# Patient Record
Sex: FEMALE | Race: WHITE | Employment: OTHER | URBAN - METROPOLITAN AREA
[De-identification: names, ages, dates, MRNs, and addresses within clinical notes are randomized per-mention and may not be internally consistent; named-entity substitution may affect disease eponyms.]

---

## 2017-08-18 ENCOUNTER — OFFICE VISIT (OUTPATIENT)
Dept: NEUROLOGY | Age: 57
End: 2017-08-18
Payer: MEDICARE

## 2017-08-18 VITALS
WEIGHT: 187 LBS | SYSTOLIC BLOOD PRESSURE: 95 MMHG | HEIGHT: 64 IN | DIASTOLIC BLOOD PRESSURE: 55 MMHG | HEART RATE: 54 BPM | BODY MASS INDEX: 31.92 KG/M2

## 2017-08-18 DIAGNOSIS — I63.032 CEREBRAL INFARCTION DUE TO THROMBOSIS OF LEFT CAROTID ARTERY (HCC): Primary | ICD-10-CM

## 2017-08-18 DIAGNOSIS — I48.91 ATRIAL FIBRILLATION, UNSPECIFIED TYPE (HCC): ICD-10-CM

## 2017-08-18 PROCEDURE — G8417 CALC BMI ABV UP PARAM F/U: HCPCS | Performed by: PSYCHIATRY & NEUROLOGY

## 2017-08-18 PROCEDURE — 3017F COLORECTAL CA SCREEN DOC REV: CPT | Performed by: PSYCHIATRY & NEUROLOGY

## 2017-08-18 PROCEDURE — 1036F TOBACCO NON-USER: CPT | Performed by: PSYCHIATRY & NEUROLOGY

## 2017-08-18 PROCEDURE — 3014F SCREEN MAMMO DOC REV: CPT | Performed by: PSYCHIATRY & NEUROLOGY

## 2017-08-18 PROCEDURE — 99214 OFFICE O/P EST MOD 30 MIN: CPT | Performed by: PSYCHIATRY & NEUROLOGY

## 2017-08-18 PROCEDURE — G8427 DOCREV CUR MEDS BY ELIG CLIN: HCPCS | Performed by: PSYCHIATRY & NEUROLOGY

## 2017-08-18 PROCEDURE — G8598 ASA/ANTIPLAT THER USED: HCPCS | Performed by: PSYCHIATRY & NEUROLOGY

## 2017-08-18 RX ORDER — FUROSEMIDE 40 MG/1
80 TABLET ORAL 2 TIMES DAILY
Status: ON HOLD | COMMUNITY
End: 2021-12-14 | Stop reason: HOSPADM

## 2017-08-18 RX ORDER — ATORVASTATIN CALCIUM 10 MG/1
10 TABLET, FILM COATED ORAL NIGHTLY
COMMUNITY

## 2017-09-02 ENCOUNTER — HOSPITAL ENCOUNTER (OUTPATIENT)
Dept: VASCULAR LAB | Age: 57
Discharge: HOME | End: 2017-09-02
Payer: MEDICARE

## 2017-09-02 DIAGNOSIS — I63.032 CEREBRAL INFARCTION DUE TO THROMBOSIS OF LEFT CAROTID ARTERY (HCC): ICD-10-CM

## 2017-09-02 PROCEDURE — 93880 EXTRACRANIAL BILAT STUDY: CPT

## 2018-08-20 ENCOUNTER — OFFICE VISIT (OUTPATIENT)
Dept: NEUROLOGY | Age: 58
End: 2018-08-20
Payer: MEDICARE

## 2018-08-20 VITALS
HEIGHT: 64 IN | WEIGHT: 198.4 LBS | SYSTOLIC BLOOD PRESSURE: 99 MMHG | DIASTOLIC BLOOD PRESSURE: 64 MMHG | BODY MASS INDEX: 33.87 KG/M2 | HEART RATE: 82 BPM

## 2018-08-20 DIAGNOSIS — I48.91 ATRIAL FIBRILLATION, UNSPECIFIED TYPE (HCC): ICD-10-CM

## 2018-08-20 DIAGNOSIS — I63.032 CEREBRAL INFARCTION DUE TO THROMBOSIS OF LEFT CAROTID ARTERY (HCC): Primary | ICD-10-CM

## 2018-08-20 PROCEDURE — G8417 CALC BMI ABV UP PARAM F/U: HCPCS | Performed by: PSYCHIATRY & NEUROLOGY

## 2018-08-20 PROCEDURE — 1036F TOBACCO NON-USER: CPT | Performed by: PSYCHIATRY & NEUROLOGY

## 2018-08-20 PROCEDURE — G8598 ASA/ANTIPLAT THER USED: HCPCS | Performed by: PSYCHIATRY & NEUROLOGY

## 2018-08-20 PROCEDURE — 99214 OFFICE O/P EST MOD 30 MIN: CPT | Performed by: PSYCHIATRY & NEUROLOGY

## 2018-08-20 PROCEDURE — G8427 DOCREV CUR MEDS BY ELIG CLIN: HCPCS | Performed by: PSYCHIATRY & NEUROLOGY

## 2018-08-20 PROCEDURE — 3017F COLORECTAL CA SCREEN DOC REV: CPT | Performed by: PSYCHIATRY & NEUROLOGY

## 2018-08-20 RX ORDER — ROPINIROLE 0.5 MG/1
0.5 TABLET, FILM COATED ORAL 3 TIMES DAILY
COMMUNITY
End: 2019-09-25 | Stop reason: ALTCHOICE

## 2018-08-20 RX ORDER — ROPINIROLE 0.5 MG/1
TABLET, FILM COATED ORAL
Qty: 180 TABLET | Refills: 3 | Status: SHIPPED | OUTPATIENT
Start: 2018-08-20 | End: 2019-07-27 | Stop reason: SDUPTHER

## 2018-08-20 RX ORDER — METOPROLOL TARTRATE 75 MG/1
12.5 TABLET, FILM COATED ORAL 2 TIMES DAILY
COMMUNITY
End: 2020-06-11 | Stop reason: HOSPADM

## 2018-08-20 RX ORDER — FLUTICASONE PROPIONATE 50 MCG
1 SPRAY, SUSPENSION (ML) NASAL DAILY
COMMUNITY
End: 2019-09-25 | Stop reason: ALTCHOICE

## 2018-08-20 ASSESSMENT — ENCOUNTER SYMPTOMS
SHORTNESS OF BREATH: 1
ALLERGIC/IMMUNOLOGIC NEGATIVE: 1
EYES NEGATIVE: 1
GASTROINTESTINAL NEGATIVE: 1

## 2018-08-20 NOTE — PROGRESS NOTES
for arthralgias and gait problem. Allergic/Immunologic: Negative. Hematological: Negative. Psychiatric/Behavioral: The patient is nervous/anxious. Objective:   Physical Exam    Vitals:    08/20/18 1450   BP: 99/64   Pulse: 82     weight: 198 lb 6.4 oz (90 kg)    Neurological Examination  Ears /Nose/Throat: external ear . Normal exam  Neck and thyroid . Normal size  Respiratory . Breathsounds clear bilaterally  Cardiovascular: Auscultation of heart with regular rate and rhythm   Musculoskeletal. Muscle bulk and tone normal   Muscle strength 5/5 strength throughout   No dysmetria or dysdiadokinesis  No tremor   Normal fine motor  Orientation Alert and oriented x 3   Language process and speech normal . No aphasia   Cranial nerve 2 normal acuety and visual fields  Cranial nerve 3, 4 and 6 . Extraocular muscles are intact . Pupils are equal and reactive   Cranial nerve 5 . Intact corneal reflex. Normal facial sensation  Cranial nerve 7 normal exam   Cranial nerve 8. Grossly intact hearing   Cranial nerve 9 and 10. Symmetric palate elevation   Cranial nerve 11 , 5 out of 5 strength   Cranial Nerve 12 midline tongue . No atrophy  Sensation . Normal pinprick and light touch   Deep Tendon Reflexes normal  Plantar response flexor bilaterally    Assessment:      1. Cerebral infarction due to thrombosis of left carotid artery ([address]    2.  Atrial fibrillation, unspecified type ([address]    Will readjust requip for restless legs to 0.5 mg po q 6PM and hs otherwise to continue current medication regimen        Plan:      As above

## 2018-08-20 NOTE — LETTER
 desvenlafaxine succinate (PRISTIQ) 100 MG TB24 Take 100 mg by mouth daily      gabapentin (NEURONTIN) 600 MG tablet Take 600 mg by mouth 3 times daily       No current facility-administered medications for this visit. No Known Allergies      Review of Systems   Constitutional: Positive for fatigue. HENT: Negative. Eyes: Negative. Respiratory: Positive for shortness of breath. Cardiovascular: Positive for leg swelling. Gastrointestinal: Negative. Endocrine: Negative. Genitourinary: Negative. Musculoskeletal: Positive for arthralgias and gait problem. Allergic/Immunologic: Negative. Hematological: Negative. Psychiatric/Behavioral: The patient is nervous/anxious. Objective:   Physical Exam    Vitals:    08/20/18 1450   BP: 99/64   Pulse: 82     weight: 198 lb 6.4 oz (90 kg)    Neurological Examination  Ears /Nose/Throat: external ear . Normal exam  Neck and thyroid . Normal size  Respiratory . Breathsounds clear bilaterally  Cardiovascular: Auscultation of heart with regular rate and rhythm   Musculoskeletal. Muscle bulk and tone normal   Muscle strength 5/5 strength throughout   No dysmetria or dysdiadokinesis  No tremor   Normal fine motor  Orientation Alert and oriented x 3   Language process and speech normal . No aphasia   Cranial nerve 2 normal acuety and visual fields  Cranial nerve 3, 4 and 6 . Extraocular muscles are intact . Pupils are equal and reactive   Cranial nerve 5 . Intact corneal reflex. Normal facial sensation  Cranial nerve 7 normal exam   Cranial nerve 8. Grossly intact hearing   Cranial nerve 9 and 10. Symmetric palate elevation   Cranial nerve 11 , 5 out of 5 strength   Cranial Nerve 12 midline tongue . No atrophy  Sensation . Normal pinprick and light touch   Deep Tendon Reflexes normal  Plantar response flexor bilaterally    Assessment:      1.  Cerebral infarction due to thrombosis of left carotid artery ([facility] [address]

## 2018-08-20 NOTE — COMMUNICATION BODY
Subjective:      Patient ID: Abilio Saavedra is a 59 y.o. female. HPI    Active problem left hemispheric infarction with prior aphasia along with right hemiparesis resolved with TPA and left ICA terminus thrombus retrieval with atrial fibrillation, 2016 . The condition is she is doing well with normal language with no weakness ,numbness , bulbar or visual complaint  . She has been changed over to [place] [id number] by cardiology . She is ambulating independantly with no disturbance . She describes rare transient head pain of few seconds . Significant medication xarelto 10 mg po qd ,  lipitor 10 mg po qd. Previously on pradaxa. She is having  restless legs at night which is interfering with sleep . Requip 0.5 mg po qhs was placed by PMD with some attenuation for the first 2 months . Restless legs complaints will begin by 7 PM when she sits down  . There is mild intermittent snoring with no apnea at night . Testing Head CT showed dense left MCA artery . CTA with left ICA terminus occlusion . CT perfusion with large left hemispheric penumbra with only small ischemic core left basal ganglia . Patient did very well post intervention returning to normal exam with full motor strength with normal language . MRI of Head with tiny punctate infarction left caudate along with left frontal periventricular area . Cholesterol 118 , , TG 92, AMY normal, homocysteine normal , Hga1c 5.4 , antithrombin 3 80 (), lupus anticoagulant normal, prothrombin mutation normal , Factor V Leiden, anticardiolipin Ab negative . SAUL EF 50-55% . Moderate to severe MR, moderate TR .  Carotid US 16-49 % stenosis bilaterally , 2017      Past Medical History:   Diagnosis Date    Arm pain Right and Left    Depression     Golfer's elbow Right and Left    Heart palpitations     Radial tunnel syndrome Right radial tunnel release     Tennis elbow Right and left       Past Surgical History:   Procedure Laterality Date     SECTION

## 2019-07-30 RX ORDER — ROPINIROLE 0.5 MG/1
TABLET, FILM COATED ORAL
Qty: 180 TABLET | Refills: 0 | Status: SHIPPED | OUTPATIENT
Start: 2019-07-30 | End: 2019-12-02

## 2019-09-25 ENCOUNTER — OFFICE VISIT (OUTPATIENT)
Dept: NEUROLOGY | Age: 59
End: 2019-09-25
Payer: MEDICARE

## 2019-09-25 VITALS
HEART RATE: 77 BPM | BODY MASS INDEX: 33.63 KG/M2 | HEIGHT: 64 IN | DIASTOLIC BLOOD PRESSURE: 73 MMHG | SYSTOLIC BLOOD PRESSURE: 118 MMHG | WEIGHT: 197 LBS

## 2019-09-25 DIAGNOSIS — I48.91 ATRIAL FIBRILLATION, UNSPECIFIED TYPE (HCC): ICD-10-CM

## 2019-09-25 DIAGNOSIS — I63.132 CEREBRAL INFARCTION DUE TO EMBOLISM OF LEFT CAROTID ARTERY (HCC): Primary | ICD-10-CM

## 2019-09-25 DIAGNOSIS — G25.81 RESTLESS LEGS SYNDROME: ICD-10-CM

## 2019-09-25 PROCEDURE — G8427 DOCREV CUR MEDS BY ELIG CLIN: HCPCS | Performed by: PSYCHIATRY & NEUROLOGY

## 2019-09-25 PROCEDURE — G8598 ASA/ANTIPLAT THER USED: HCPCS | Performed by: PSYCHIATRY & NEUROLOGY

## 2019-09-25 PROCEDURE — 3017F COLORECTAL CA SCREEN DOC REV: CPT | Performed by: PSYCHIATRY & NEUROLOGY

## 2019-09-25 PROCEDURE — 1036F TOBACCO NON-USER: CPT | Performed by: PSYCHIATRY & NEUROLOGY

## 2019-09-25 PROCEDURE — 99214 OFFICE O/P EST MOD 30 MIN: CPT | Performed by: PSYCHIATRY & NEUROLOGY

## 2019-09-25 PROCEDURE — G8417 CALC BMI ABV UP PARAM F/U: HCPCS | Performed by: PSYCHIATRY & NEUROLOGY

## 2019-09-25 RX ORDER — ROPINIROLE 1 MG/1
TABLET, FILM COATED ORAL
Qty: 180 TABLET | Refills: 3 | Status: SHIPPED | OUTPATIENT
Start: 2019-09-25 | End: 2020-05-15 | Stop reason: SDUPTHER

## 2019-09-25 ASSESSMENT — ENCOUNTER SYMPTOMS
ALLERGIC/IMMUNOLOGIC NEGATIVE: 1
GASTROINTESTINAL NEGATIVE: 1
EYES NEGATIVE: 1
SHORTNESS OF BREATH: 1

## 2019-09-25 NOTE — LETTER
 Intimate partner violence:     Fear of current or ex partner: None     Emotionally abused: None     Physically abused: None     Forced sexual activity: None   Other Topics Concern    None   Social History Narrative    None       Current Outpatient Medications   Medication Sig Dispense Refill    rOPINIRole (REQUIP) 1 MG tablet Take 1 po q 6PM , 1po qhs 180 tablet 3    rOPINIRole (REQUIP) 0.5 MG tablet TAKE 1 TABLET BY MOUTH AT  6PM AND AT BEDTIME 180 tablet 0    Metoprolol Tartrate 75 MG TABS Take 75 mg by mouth 2 times daily      furosemide (LASIX) 40 MG tablet 2 qam and 1-2 in the afternoon      atorvastatin (LIPITOR) 10 MG tablet Take 10 mg by mouth daily      Potassium Chloride Natalie CR (KLOR-CON M20 PO) Take by mouth daily      Fluticasone-Salmeterol (ADVAIR HFA IN) Inhale 2 puffs into the lungs 2 times daily as needed      amiodarone (CORDARONE) 200 MG tablet Take 200 mg by mouth daily      baclofen (LIORESAL) 10 MG tablet Take 10 mg by mouth 2 times daily as needed      desvenlafaxine succinate (PRISTIQ) 100 MG TB24 Take 100 mg by mouth daily      gabapentin (NEURONTIN) 600 MG tablet Take 600 mg by mouth 3 times daily       No current facility-administered medications for this visit. No Known Allergies      Review of Systems   Constitutional: Positive for unexpected weight change. HENT: Negative. Eyes: Negative. Respiratory: Positive for shortness of breath. Cardiovascular: Positive for leg swelling. Gastrointestinal: Negative. Endocrine: Negative. Genitourinary: Negative. Musculoskeletal: Negative. Allergic/Immunologic: Negative. Hematological: Negative. Psychiatric/Behavioral: The patient is nervous/anxious. Objective:   Physical Exam    Vitals:    09/25/19 0939   BP: 118/73   Pulse: 77     weight: 197 lb (89.4 kg)    Neurological Examination  Ears /Nose/Throat: external ear . Normal exam  Neck and thyroid . Normal size

## 2019-09-25 NOTE — COMMUNICATION BODY
75 MG TABS Take 75 mg by mouth 2 times daily      furosemide (LASIX) 40 MG tablet 2 qam and 1-2 in the afternoon      atorvastatin (LIPITOR) 10 MG tablet Take 10 mg by mouth daily      Potassium Chloride Natalie CR (KLOR-CON M20 PO) Take by mouth daily      Fluticasone-Salmeterol (ADVAIR HFA IN) Inhale 2 puffs into the lungs 2 times daily as needed      amiodarone (CORDARONE) 200 MG tablet Take 200 mg by mouth daily      baclofen (LIORESAL) 10 MG tablet Take 10 mg by mouth 2 times daily as needed      desvenlafaxine succinate (PRISTIQ) 100 MG TB24 Take 100 mg by mouth daily      gabapentin (NEURONTIN) 600 MG tablet Take 600 mg by mouth 3 times daily       No current facility-administered medications for this visit. No Known Allergies      Review of Systems   Constitutional: Positive for unexpected weight change. HENT: Negative. Eyes: Negative. Respiratory: Positive for shortness of breath. Cardiovascular: Positive for leg swelling. Gastrointestinal: Negative. Endocrine: Negative. Genitourinary: Negative. Musculoskeletal: Negative. Allergic/Immunologic: Negative. Hematological: Negative. Psychiatric/Behavioral: The patient is nervous/anxious. Objective:   Physical Exam    Vitals:    09/25/19 0939   BP: 118/73   Pulse: 77     weight: 197 lb (89.4 kg)    Neurological Examination  Ears /Nose/Throat: external ear . Normal exam  Neck and thyroid . Normal size  Respiratory . Breathsounds clear bilaterally  Cardiovascular: Auscultation of heart with regular rate and rhythm   Musculoskeletal. Muscle bulk and tone normal   Muscle strength 5/5 strength throughout   No dysmetria or dysdiadokinesis  No tremor   Normal fine motor  Orientation Alert and oriented x 3   Language process and speech normal . No aphasia   Cranial nerve 2 normal acuety and visual fields  Cranial nerve 3, 4 and 6 . Extraocular muscles are intact . Pupils are equal and reactive   Cranial nerve 5 .  Intact

## 2019-09-30 DIAGNOSIS — I63.132 CEREBRAL INFARCTION DUE TO EMBOLISM OF LEFT CAROTID ARTERY (HCC): ICD-10-CM

## 2019-12-02 ENCOUNTER — HOSPITAL ENCOUNTER (OUTPATIENT)
Dept: CARDIAC CATH/INVASIVE PROCEDURES | Age: 59
Discharge: HOME | End: 2019-12-02
Payer: MEDICARE

## 2019-12-02 VITALS
BODY MASS INDEX: 32.44 KG/M2 | RESPIRATION RATE: 17 BRPM | OXYGEN SATURATION: 97 % | WEIGHT: 190 LBS | DIASTOLIC BLOOD PRESSURE: 78 MMHG | SYSTOLIC BLOOD PRESSURE: 118 MMHG | TEMPERATURE: 97.2 F | HEART RATE: 54 BPM | HEIGHT: 64 IN

## 2019-12-02 LAB
GFR NON-AFRICAN AMERICAN: 46 ML/MIN
GFR SERPL CREATININE-BSD FRML MDRD: 55 ML/MIN
GFR SERPL CREATININE-BSD FRML MDRD: ABNORMAL ML/MIN/{1.73_M2}
GLUCOSE BLD-MCNC: 97 MG/DL (ref 74–100)
POC CHLORIDE: 99 MMOL/L (ref 98–107)
POC CREATININE: 1.2 MG/DL (ref 0.51–1.19)
POC HEMATOCRIT: 51 % (ref 36–46)
POC HEMOGLOBIN: 17.3 G/DL (ref 12–16)
POC INR: 2.1
POC POTASSIUM: 3.4 MMOL/L (ref 3.5–4.5)
POC SODIUM: 139 MMOL/L (ref 138–146)
PROTHROMBIN TIME, POC: 24.1 SEC (ref 10.4–14.2)

## 2019-12-02 PROCEDURE — 92960 CARDIOVERSION ELECTRIC EXT: CPT | Performed by: INTERNAL MEDICINE

## 2019-12-02 PROCEDURE — 2709999900 HC NON-CHARGEABLE SUPPLY

## 2019-12-02 PROCEDURE — 84295 ASSAY OF SERUM SODIUM: CPT

## 2019-12-02 PROCEDURE — 84132 ASSAY OF SERUM POTASSIUM: CPT

## 2019-12-02 PROCEDURE — 85610 PROTHROMBIN TIME: CPT

## 2019-12-02 PROCEDURE — 85014 HEMATOCRIT: CPT

## 2019-12-02 PROCEDURE — 7100000000 HC PACU RECOVERY - FIRST 15 MIN

## 2019-12-02 PROCEDURE — 6360000002 HC RX W HCPCS

## 2019-12-02 PROCEDURE — 93005 ELECTROCARDIOGRAM TRACING: CPT | Performed by: INTERNAL MEDICINE

## 2019-12-02 PROCEDURE — 82435 ASSAY OF BLOOD CHLORIDE: CPT

## 2019-12-02 PROCEDURE — 7100000001 HC PACU RECOVERY - ADDTL 15 MIN

## 2019-12-02 PROCEDURE — 82947 ASSAY GLUCOSE BLOOD QUANT: CPT

## 2019-12-02 PROCEDURE — 82565 ASSAY OF CREATININE: CPT

## 2019-12-02 RX ORDER — SODIUM CHLORIDE 9 MG/ML
INJECTION, SOLUTION INTRAVENOUS CONTINUOUS
Status: DISCONTINUED | OUTPATIENT
Start: 2019-12-02 | End: 2019-12-03 | Stop reason: HOSPADM

## 2019-12-02 RX ADMIN — SODIUM CHLORIDE: 9 INJECTION, SOLUTION INTRAVENOUS at 10:18

## 2019-12-03 LAB
EKG ATRIAL RATE: 271 BPM
EKG Q-T INTERVAL: 432 MS
EKG QRS DURATION: 92 MS
EKG QTC CALCULATION (BAZETT): 473 MS
EKG R AXIS: 79 DEGREES
EKG T AXIS: 18 DEGREES
EKG VENTRICULAR RATE: 72 BPM

## 2020-04-13 NOTE — TELEPHONE ENCOUNTER
Mrs. Corina Guardado called the office wondering if the Requip dose could be increased. She currently takes 1 mg at 6 pm and 1 mg between 10 -11 pm.  Patient states that she only gets at most a couple hours sleep. She also stated that the restless legs can happen basically anytime during the day, anytime that she is not active. I told her I would speak with Dr. Jovanny Briceno and get back with her. Patient verbally stated her understanding.

## 2020-04-15 RX ORDER — CLONAZEPAM 0.5 MG/1
0.5 TABLET ORAL NIGHTLY
Qty: 30 TABLET | Refills: 0 | Status: SHIPPED | OUTPATIENT
Start: 2020-04-15 | End: 2020-06-11

## 2020-05-15 RX ORDER — ROPINIROLE 1 MG/1
TABLET, FILM COATED ORAL
Qty: 450 TABLET | Refills: 0 | Status: SHIPPED | OUTPATIENT
Start: 2020-05-15 | End: 2020-07-16

## 2020-05-15 NOTE — TELEPHONE ENCOUNTER
I spoke with Alice Perales to confirm that she was taking both the Requip and Klonopin. She said she was. I also ask when the restless legs bothered. She told me that anytime during the day when she sits they are bad and at night when lying down. I discussed this with Dr. George Mcbride. He is increasing the Requip to a total of 4 mg daily. She is to take one upon rising, one at 2 pm and two at bedtime. I called Alice Perales and gave her the dose increase. She voiced understanding. I also called [facility] and cancelled and refills left on the prescription for two tabs daily. I spoke with Martinez Camacho.

## 2020-05-15 NOTE — TELEPHONE ENCOUNTER
Sofiya Grijalva called 5/14/2020 to let us know that the Klonopin is not helping her restless legs at all. She said that the Repuip 1 mg at 6 pm and HS was helping more.

## 2020-06-08 ENCOUNTER — HOSPITAL ENCOUNTER (OUTPATIENT)
Dept: PREADMISSION TESTING | Age: 60
Setting detail: SPECIMEN
Discharge: HOME | End: 2020-06-12
Payer: MEDICARE

## 2020-06-08 PROCEDURE — U0004 COV-19 TEST NON-CDC HGH THRU: HCPCS

## 2020-06-09 LAB
SARS-COV-2, PCR: NOT DETECTED
SARS-COV-2, RAPID: NORMAL
SARS-COV-2: NORMAL
SOURCE: NORMAL

## 2020-06-10 ENCOUNTER — ANESTHESIA EVENT (OUTPATIENT)
Dept: CARDIAC CATH/INVASIVE PROCEDURES | Age: 60
End: 2020-06-10

## 2020-06-11 ENCOUNTER — HOSPITAL ENCOUNTER (OUTPATIENT)
Dept: CARDIAC CATH/INVASIVE PROCEDURES | Age: 60
Discharge: HOME | End: 2020-06-11
Payer: MEDICARE

## 2020-06-11 ENCOUNTER — ANESTHESIA (OUTPATIENT)
Dept: CARDIAC CATH/INVASIVE PROCEDURES | Age: 60
End: 2020-06-11

## 2020-06-11 VITALS — SYSTOLIC BLOOD PRESSURE: 140 MMHG | DIASTOLIC BLOOD PRESSURE: 127 MMHG | OXYGEN SATURATION: 100 % | TEMPERATURE: 94.9 F

## 2020-06-11 VITALS
DIASTOLIC BLOOD PRESSURE: 67 MMHG | RESPIRATION RATE: 20 BRPM | WEIGHT: 189 LBS | TEMPERATURE: 96.4 F | HEART RATE: 59 BPM | OXYGEN SATURATION: 100 % | BODY MASS INDEX: 32.27 KG/M2 | HEIGHT: 64 IN | SYSTOLIC BLOOD PRESSURE: 128 MMHG

## 2020-06-11 LAB
ACTIVATED CLOTTING TIME: 272 SEC (ref 79–149)
ACTIVATED CLOTTING TIME: 365 SEC (ref 79–149)
ACTIVATED CLOTTING TIME: 407 SEC (ref 79–149)
EKG ATRIAL RATE: 59 BPM
EKG P AXIS: 81 DEGREES
EKG P-R INTERVAL: 212 MS
EKG Q-T INTERVAL: 488 MS
EKG QRS DURATION: 92 MS
EKG QTC CALCULATION (BAZETT): 483 MS
EKG R AXIS: 91 DEGREES
EKG T AXIS: 49 DEGREES
EKG VENTRICULAR RATE: 59 BPM
POC INR: 1.3
PROTHROMBIN TIME, POC: 15.7 SEC (ref 10.4–14.2)

## 2020-06-11 PROCEDURE — C1894 INTRO/SHEATH, NON-LASER: HCPCS

## 2020-06-11 PROCEDURE — 2720000010 HC SURG SUPPLY STERILE

## 2020-06-11 PROCEDURE — 3700000000 HC ANESTHESIA ATTENDED CARE

## 2020-06-11 PROCEDURE — 2500000003 HC RX 250 WO HCPCS: Performed by: NURSE ANESTHETIST, CERTIFIED REGISTERED

## 2020-06-11 PROCEDURE — 93656 COMPRE EP EVAL ABLTJ ATR FIB: CPT | Performed by: INTERNAL MEDICINE

## 2020-06-11 PROCEDURE — 2500000003 HC RX 250 WO HCPCS

## 2020-06-11 PROCEDURE — 6360000002 HC RX W HCPCS: Performed by: NURSE ANESTHETIST, CERTIFIED REGISTERED

## 2020-06-11 PROCEDURE — 7100000000 HC PACU RECOVERY - FIRST 15 MIN

## 2020-06-11 PROCEDURE — C1732 CATH, EP, DIAG/ABL, 3D/VECT: HCPCS

## 2020-06-11 PROCEDURE — 93657 TX L/R ATRIAL FIB ADDL: CPT | Performed by: INTERNAL MEDICINE

## 2020-06-11 PROCEDURE — 93662 INTRACARDIAC ECG (ICE): CPT | Performed by: INTERNAL MEDICINE

## 2020-06-11 PROCEDURE — C1730 CATH, EP, 19 OR FEW ELECT: HCPCS

## 2020-06-11 PROCEDURE — 2580000003 HC RX 258: Performed by: NURSE ANESTHETIST, CERTIFIED REGISTERED

## 2020-06-11 PROCEDURE — 93312 ECHO TRANSESOPHAGEAL: CPT

## 2020-06-11 PROCEDURE — 6360000004 HC RX CONTRAST MEDICATION

## 2020-06-11 PROCEDURE — C1759 CATH, INTRA ECHOCARDIOGRAPHY: HCPCS

## 2020-06-11 PROCEDURE — 93005 ELECTROCARDIOGRAM TRACING: CPT | Performed by: INTERNAL MEDICINE

## 2020-06-11 PROCEDURE — 3700000001 HC ADD 15 MINUTES (ANESTHESIA)

## 2020-06-11 PROCEDURE — C1733 CATH, EP, OTHR THAN COOL-TIP: HCPCS

## 2020-06-11 PROCEDURE — 93655 ICAR CATH ABLTJ DSCRT ARRHYT: CPT | Performed by: INTERNAL MEDICINE

## 2020-06-11 PROCEDURE — 6360000002 HC RX W HCPCS

## 2020-06-11 PROCEDURE — 85347 COAGULATION TIME ACTIVATED: CPT

## 2020-06-11 PROCEDURE — 2709999900 HC NON-CHARGEABLE SUPPLY

## 2020-06-11 PROCEDURE — 93010 ELECTROCARDIOGRAM REPORT: CPT | Performed by: INTERNAL MEDICINE

## 2020-06-11 PROCEDURE — 7100000001 HC PACU RECOVERY - ADDTL 15 MIN

## 2020-06-11 PROCEDURE — C1769 GUIDE WIRE: HCPCS

## 2020-06-11 PROCEDURE — 93613 INTRACARDIAC EPHYS 3D MAPG: CPT | Performed by: INTERNAL MEDICINE

## 2020-06-11 PROCEDURE — 85610 PROTHROMBIN TIME: CPT

## 2020-06-11 PROCEDURE — 93325 DOPPLER ECHO COLOR FLOW MAPG: CPT

## 2020-06-11 RX ORDER — LIDOCAINE HYDROCHLORIDE 10 MG/ML
INJECTION, SOLUTION EPIDURAL; INFILTRATION; INTRACAUDAL; PERINEURAL PRN
Status: DISCONTINUED | OUTPATIENT
Start: 2020-06-11 | End: 2020-06-11 | Stop reason: SDUPTHER

## 2020-06-11 RX ORDER — DEXAMETHASONE SODIUM PHOSPHATE 10 MG/ML
INJECTION INTRAMUSCULAR; INTRAVENOUS PRN
Status: DISCONTINUED | OUTPATIENT
Start: 2020-06-11 | End: 2020-06-11 | Stop reason: SDUPTHER

## 2020-06-11 RX ORDER — GLYCOPYRROLATE 1 MG/5 ML
SYRINGE (ML) INTRAVENOUS PRN
Status: DISCONTINUED | OUTPATIENT
Start: 2020-06-11 | End: 2020-06-11 | Stop reason: SDUPTHER

## 2020-06-11 RX ORDER — ACETAMINOPHEN 325 MG/1
650 TABLET ORAL EVERY 4 HOURS PRN
Status: DISCONTINUED | OUTPATIENT
Start: 2020-06-11 | End: 2020-06-12 | Stop reason: HOSPADM

## 2020-06-11 RX ORDER — ROCURONIUM BROMIDE 10 MG/ML
INJECTION, SOLUTION INTRAVENOUS PRN
Status: DISCONTINUED | OUTPATIENT
Start: 2020-06-11 | End: 2020-06-11 | Stop reason: SDUPTHER

## 2020-06-11 RX ORDER — SODIUM CHLORIDE, SODIUM LACTATE, POTASSIUM CHLORIDE, CALCIUM CHLORIDE 600; 310; 30; 20 MG/100ML; MG/100ML; MG/100ML; MG/100ML
INJECTION, SOLUTION INTRAVENOUS CONTINUOUS PRN
Status: DISCONTINUED | OUTPATIENT
Start: 2020-06-11 | End: 2020-06-11 | Stop reason: SDUPTHER

## 2020-06-11 RX ORDER — HEPARIN SODIUM 1000 [USP'U]/ML
INJECTION, SOLUTION INTRAVENOUS; SUBCUTANEOUS PRN
Status: DISCONTINUED | OUTPATIENT
Start: 2020-06-11 | End: 2020-06-11 | Stop reason: SDUPTHER

## 2020-06-11 RX ORDER — SODIUM CHLORIDE 9 MG/ML
INJECTION, SOLUTION INTRAVENOUS CONTINUOUS
Status: DISCONTINUED | OUTPATIENT
Start: 2020-06-11 | End: 2020-06-12 | Stop reason: HOSPADM

## 2020-06-11 RX ORDER — NEOSTIGMINE METHYLSULFATE 5 MG/5 ML
SYRINGE (ML) INTRAVENOUS PRN
Status: DISCONTINUED | OUTPATIENT
Start: 2020-06-11 | End: 2020-06-11 | Stop reason: SDUPTHER

## 2020-06-11 RX ORDER — SODIUM CHLORIDE 0.9 % (FLUSH) 0.9 %
10 SYRINGE (ML) INJECTION EVERY 12 HOURS SCHEDULED
Status: DISCONTINUED | OUTPATIENT
Start: 2020-06-11 | End: 2020-06-12 | Stop reason: HOSPADM

## 2020-06-11 RX ORDER — FENTANYL CITRATE 50 UG/ML
INJECTION, SOLUTION INTRAMUSCULAR; INTRAVENOUS PRN
Status: DISCONTINUED | OUTPATIENT
Start: 2020-06-11 | End: 2020-06-11 | Stop reason: SDUPTHER

## 2020-06-11 RX ORDER — SPIRONOLACTONE 25 MG/1
25 TABLET ORAL DAILY
Status: ON HOLD | COMMUNITY
End: 2021-12-07

## 2020-06-11 RX ORDER — ONDANSETRON 2 MG/ML
INJECTION INTRAMUSCULAR; INTRAVENOUS PRN
Status: DISCONTINUED | OUTPATIENT
Start: 2020-06-11 | End: 2020-06-11 | Stop reason: SDUPTHER

## 2020-06-11 RX ORDER — SODIUM CHLORIDE 0.9 % (FLUSH) 0.9 %
10 SYRINGE (ML) INJECTION PRN
Status: DISCONTINUED | OUTPATIENT
Start: 2020-06-11 | End: 2020-06-12 | Stop reason: HOSPADM

## 2020-06-11 RX ORDER — PROPOFOL 10 MG/ML
INJECTION, EMULSION INTRAVENOUS PRN
Status: DISCONTINUED | OUTPATIENT
Start: 2020-06-11 | End: 2020-06-11 | Stop reason: SDUPTHER

## 2020-06-11 RX ORDER — PHENYLEPHRINE HYDROCHLORIDE 10 MG/ML
INJECTION INTRAVENOUS PRN
Status: DISCONTINUED | OUTPATIENT
Start: 2020-06-11 | End: 2020-06-11 | Stop reason: SDUPTHER

## 2020-06-11 RX ADMIN — LIDOCAINE HYDROCHLORIDE 50 MG: 10 INJECTION, SOLUTION EPIDURAL; INFILTRATION; INTRACAUDAL; PERINEURAL at 08:22

## 2020-06-11 RX ADMIN — PHENYLEPHRINE HYDROCHLORIDE 200 MCG: 10 INJECTION INTRAVENOUS at 09:17

## 2020-06-11 RX ADMIN — SODIUM CHLORIDE, POTASSIUM CHLORIDE, SODIUM LACTATE AND CALCIUM CHLORIDE: 600; 310; 30; 20 INJECTION, SOLUTION INTRAVENOUS at 08:27

## 2020-06-11 RX ADMIN — PHENYLEPHRINE HYDROCHLORIDE 100 MCG: 10 INJECTION INTRAVENOUS at 08:33

## 2020-06-11 RX ADMIN — HEPARIN SODIUM 5000 UNITS: 1000 INJECTION INTRAVENOUS; SUBCUTANEOUS at 09:10

## 2020-06-11 RX ADMIN — ONDANSETRON 4 MG: 2 INJECTION, SOLUTION INTRAMUSCULAR; INTRAVENOUS at 10:10

## 2020-06-11 RX ADMIN — Medication 3 MG: at 10:33

## 2020-06-11 RX ADMIN — SODIUM CHLORIDE: 9 INJECTION, SOLUTION INTRAVENOUS at 07:23

## 2020-06-11 RX ADMIN — FENTANYL CITRATE 100 MCG: 50 INJECTION INTRAMUSCULAR; INTRAVENOUS at 08:22

## 2020-06-11 RX ADMIN — HEPARIN SODIUM 10000 UNITS: 1000 INJECTION INTRAVENOUS; SUBCUTANEOUS at 08:55

## 2020-06-11 RX ADMIN — Medication 0.6 MG: at 10:33

## 2020-06-11 RX ADMIN — ROCURONIUM BROMIDE 40 MG: 10 INJECTION INTRAVENOUS at 08:22

## 2020-06-11 RX ADMIN — PHENYLEPHRINE HYDROCHLORIDE 30 MCG/MIN: 10 INJECTION INTRAVENOUS at 08:38

## 2020-06-11 RX ADMIN — DEXAMETHASONE SODIUM PHOSPHATE 10 MG: 10 INJECTION INTRAMUSCULAR; INTRAVENOUS at 08:40

## 2020-06-11 RX ADMIN — PHENYLEPHRINE HYDROCHLORIDE 200 MCG: 10 INJECTION INTRAVENOUS at 09:45

## 2020-06-11 RX ADMIN — PROPOFOL 150 MG: 10 INJECTION, EMULSION INTRAVENOUS at 08:23

## 2020-06-11 ASSESSMENT — PULMONARY FUNCTION TESTS
PIF_VALUE: 16
PIF_VALUE: 17
PIF_VALUE: 16
PIF_VALUE: 3
PIF_VALUE: 16
PIF_VALUE: 15
PIF_VALUE: 26
PIF_VALUE: 17
PIF_VALUE: 16
PIF_VALUE: 17
PIF_VALUE: 16
PIF_VALUE: 17
PIF_VALUE: 16
PIF_VALUE: 24
PIF_VALUE: 17
PIF_VALUE: 16
PIF_VALUE: 13
PIF_VALUE: 16
PIF_VALUE: 20
PIF_VALUE: 17
PIF_VALUE: 16
PIF_VALUE: 17
PIF_VALUE: 16
PIF_VALUE: 4
PIF_VALUE: 16
PIF_VALUE: 16
PIF_VALUE: 17
PIF_VALUE: 16
PIF_VALUE: 15
PIF_VALUE: 16
PIF_VALUE: 16
PIF_VALUE: 2
PIF_VALUE: 16
PIF_VALUE: 18
PIF_VALUE: 16
PIF_VALUE: 9
PIF_VALUE: 17
PIF_VALUE: 16
PIF_VALUE: 16
PIF_VALUE: 17
PIF_VALUE: 16
PIF_VALUE: 14
PIF_VALUE: 16
PIF_VALUE: 16
PIF_VALUE: 17
PIF_VALUE: 17
PIF_VALUE: 2
PIF_VALUE: 16
PIF_VALUE: 17
PIF_VALUE: 16
PIF_VALUE: 16
PIF_VALUE: 17
PIF_VALUE: 16
PIF_VALUE: 16
PIF_VALUE: 3
PIF_VALUE: 17
PIF_VALUE: 16
PIF_VALUE: 2
PIF_VALUE: 17
PIF_VALUE: 16
PIF_VALUE: 2
PIF_VALUE: 16
PIF_VALUE: 18
PIF_VALUE: 17
PIF_VALUE: 6
PIF_VALUE: 16
PIF_VALUE: 16
PIF_VALUE: 17
PIF_VALUE: 16
PIF_VALUE: 17
PIF_VALUE: 16
PIF_VALUE: 16
PIF_VALUE: 0
PIF_VALUE: 16
PIF_VALUE: 16
PIF_VALUE: 17
PIF_VALUE: 16
PIF_VALUE: 16
PIF_VALUE: 17
PIF_VALUE: 16
PIF_VALUE: 17
PIF_VALUE: 16
PIF_VALUE: 14
PIF_VALUE: 17
PIF_VALUE: 16
PIF_VALUE: 6
PIF_VALUE: 17
PIF_VALUE: 17
PIF_VALUE: 11
PIF_VALUE: 16
PIF_VALUE: 17
PIF_VALUE: 16
PIF_VALUE: 16
PIF_VALUE: 0
PIF_VALUE: 16
PIF_VALUE: 13
PIF_VALUE: 16
PIF_VALUE: 17
PIF_VALUE: 17
PIF_VALUE: 18
PIF_VALUE: 2
PIF_VALUE: 16
PIF_VALUE: 16
PIF_VALUE: 1
PIF_VALUE: 18
PIF_VALUE: 17

## 2020-06-11 ASSESSMENT — COPD QUESTIONNAIRES: CAT_SEVERITY: NO INTERVAL CHANGE

## 2020-06-11 NOTE — H&P
2 times daily    Yes Historical Provider, MD   Fluticasone-Salmeterol (ADVAIR HFA IN) Inhale 2 puffs into the lungs 2 times daily    Yes Historical Provider, MD   amiodarone (CORDARONE) 200 MG tablet Take 200 mg by mouth daily   Yes Historical Provider, MD   baclofen (LIORESAL) 10 MG tablet Take 10 mg by mouth daily    Yes Historical Provider, MD   desvenlafaxine succinate (PRISTIQ) 100 MG TB24 Take 100 mg by mouth daily   Yes Historical Provider, MD   gabapentin (NEURONTIN) 600 MG tablet Take 300 mg by mouth 2 times daily. Yes Historical Provider, MD       Allergies:  Patient has no known allergies. Social History:   reports that she has quit smoking. She has never used smokeless tobacco. She reports that she does not drink alcohol or use drugs. Family History: family history is not on file. REVIEW OF SYSTEMS:    · Negative except for palpitations. PHYSICAL EXAM:    Physical Examination:    /61   Pulse 61   Temp 97.6 °F (36.4 °C) (Oral)   Resp 16   Ht 5' 4\" (1.626 m)   Wt 189 lb (85.7 kg)   SpO2 100%   BMI 32.44 kg/m²    Constitutional and General Appearance: alert, cooperative, no distress and appears stated age  HEENT: PERRL, no cervical lymphadenopathy. No masses palpable. Normal oral mucosa  Respiratory:  · Normal excursion and expansion without use of accessory muscles  · Resp Auscultation: Good respiratory effort. No for increased work of breathing. On auscultation: clear to auscultation bilaterally  Cardiovascular:  · The apical impulse is not displaced  · Heart tones are crisp and normal. regular S1 and S2.  · Jugular venous pulsation Normal  · The carotid upstroke is normal in amplitude and contour without delay or bruit  · Peripheral pulses are symmetrical and full   Abdomen:  · No masses or tenderness  · Bowel sounds present  Extremities:  ·  No Cyanosis or Clubbing  ·  Lower extremity edema: No  ·  Skin: Warm and dry  Neurological:  · Alert and oriented.   · Moves all cardiology

## 2020-06-11 NOTE — PLAN OF CARE
Problem: Falls - Risk of:  Goal: Will remain free from falls  Description: Will remain free from falls  Outcome: Met This Shift     Problem: Falls - Risk of:  Goal: Absence of physical injury  Description: Absence of physical injury  Outcome: Met This Shift     Problem: Anxiety:  Goal: Level of anxiety will decrease  Description: Level of anxiety will decrease  Outcome: Met This Shift

## 2020-06-11 NOTE — PROGRESS NOTES
Phase 1 recovery time/criteria met. Gag reflex present. Eating meal and taking fluids well.   at bedside

## 2020-06-11 NOTE — PROGRESS NOTES
All discharge instructions reviewed, questions answered, paper signed and given copy by Wojciech DE LA CRUZ. Patient discharged per wheelchair with  and belongings.

## 2020-06-11 NOTE — PROGRESS NOTES
Dr. Zulma Berman verbally told writer that Figure 8 suture may be removed in 3 hrs and pt discharged.

## 2020-06-11 NOTE — ANESTHESIA PRE PROCEDURE
(REQUIP) 1 MG tablet Take 1 mg upon rising, 1 mg at 2 pm and 2 mg  qhs 450 tablet 0    Warfarin Sodium (COUMADIN PO) Take 2.5 mg by mouth Daily with supper      Calcium Carb-Cholecalciferol (CALCIUM 600+D) 600-800 MG-UNIT TABS Take 1 tablet by mouth daily      Metoprolol Tartrate 75 MG TABS Take 12.5 mg by mouth 2 times daily       furosemide (LASIX) 40 MG tablet 40 mg 2 times daily 2 qam and 1-2 in the afternoon       atorvastatin (LIPITOR) 10 MG tablet Take 10 mg by mouth nightly       Potassium Chloride Natalie CR (KLOR-CON M20 PO) Take 40 mEq by mouth 2 times daily       Fluticasone-Salmeterol (ADVAIR HFA IN) Inhale 2 puffs into the lungs 2 times daily       amiodarone (CORDARONE) 200 MG tablet Take 200 mg by mouth daily      baclofen (LIORESAL) 10 MG tablet Take 10 mg by mouth daily       desvenlafaxine succinate (PRISTIQ) 100 MG TB24 Take 100 mg by mouth daily      gabapentin (NEURONTIN) 600 MG tablet Take 300 mg by mouth 2 times daily.         Current Facility-Administered Medications   Medication Dose Route Frequency Provider Last Rate Last Dose    0.9 % sodium chloride infusion   Intravenous Continuous Behzad Sewell MD           Allergies:  No Known Allergies    Problem List:    Patient Active Problem List   Diagnosis Code    Cerebrovascular accident (CVA) involving left middle cerebral artery [facility]) I63.512    Right sided weakness R53.1    Acute ischemic left internal carotid artery (ICA) stroke ([facility]) P53.389    Arterial ischemic stroke, MCA (middle cerebral artery), left, acute ([facility] I63.512    Global aphasia R47.01    Mobility impaired Z74.09    Cerebral infarction due to occlusion of left carotid artery (HCC) X61.448    Aphasia R47.01    Right hemiparesis ([facility]) G81.91    Atrial fibrillation status post cardioversion ([address] I48.91       Past Medical History:        Diagnosis Date    Arm pain Right and Left    Atrial fibrillation (HCC)     Atrial flutter ([facility]     Cancer ([facility]     skin    [facility]ch[facility] obstructive [facility] disease) [facility]     Depression     Former tobacco use     H/O: CVA (cerebrovascular accident)     2016    Heart palpitations     Hyperlipidemia     Radial tunnel syndrome Right radial tunnel release     Restless legs     Tennis elbow Right and left       Past Surgical History:        Procedure Laterality Date    ABLATION OF DYSRHYTHMIC FOCUS  2020    Dr. Sulaiman Calles  2016    failed    CARDIOVERSION  2019     SECTION      COLONOSCOPY      WRIST GANGLION EXCISION Right        Social History:    Social History     Tobacco Use    Smoking status: Former Smoker    Smokeless tobacco: Never Used   Substance Use Topics    Alcohol use: No     Alcohol/week: 0.0 standard drinks                                Counseling given: Not Answered      Vital Signs (Current):   Vitals:    20 0705   BP: 124/61   Pulse: 61   Resp: 16   Temp: 97.6 °F (36.4 °C)   TempSrc: Oral   SpO2: 100%   Weight: 189 lb (85.7 kg)   Height: 5' 4\" (1.626 m)                                              BP Readings from Last 3 Encounters:   20 124/61   19 118/78   19 118/73       NPO Status: Time of last liquid consumption: 2200                        Time of last solid consumption: 2100                                                      BMI:   Wt Readings from Last 3 Encounters:   20 189 lb (85.7 kg)   19 190 lb (86.2 kg)   19 197 lb (89.4 kg)     Body mass index is 32.44 kg/m².     CBC:   Lab Results   Component Value Date    WBC 7.1 2016    RBC 4.35 2016    HGB 13.4 2016    HCT 39.9 2016    MCV 91.8 2016    RDW 13.3 2016     2016       CMP:   Lab Results   Component Value Date     2016    K 3.9 2016    CL 97 2016    CO2 24 2016    BUN 15 2016    CREATININE 1.20 2019    CREATININE 0.84 2016    GFRAA >60

## 2020-06-11 NOTE — ANESTHESIA POSTPROCEDURE EVALUATION
Department of Anesthesiology  Postprocedure Note    Patient: Valarie Noland  MRN: [id number]  YOB: 1960  Date of evaluation: 6/11/2020  Time:  12:21 PM     Procedure Summary     Date:  06/11/20 Room / Location:  [facility] Cath Lab    Anesthesia Start:  4525 Anesthesia Stop:  1059    Procedure:  SAUL A-FIB ABLATION W/ANES Diagnosis:  Unspecified atrial fibrillation    Scheduled Providers: Finley Siemens, MD Responsible Provider:  Finley Siemens, MD    Anesthesia Type:  general ASA Status:  3          Anesthesia Type: general    Ailyn Phase I:      Ailyn Phase II:      Last vitals: Reviewed and per EMR flowsheets.        Anesthesia Post Evaluation    Patient location during evaluation: PACU  Patient participation: complete - patient participated  Level of consciousness: awake and alert  Pain score: 0  Airway patency: patent  Nausea & Vomiting: no vomiting and no nausea  Complications: no  Cardiovascular status: hemodynamically stable  Respiratory status: acceptable  Hydration status: stable

## 2020-07-16 RX ORDER — ROPINIROLE 1 MG/1
TABLET, FILM COATED ORAL
Qty: 360 TABLET | Refills: 0 | Status: SHIPPED | OUTPATIENT
Start: 2020-08-13 | End: 2020-10-06 | Stop reason: SDUPTHER

## 2020-07-16 NOTE — TELEPHONE ENCOUNTER
Pharmacy requesting refill of Requip.       Medication active on med list yes      Date of last order: 5/15/20 for a 90 day supply  Next Rx due: 8/13/2020    verified on 7/16/20  verified by Maria Teresa Edwards LPN      Date of last appointment 9/25/2019    Next Visit Date:  10/6/2020

## 2020-10-06 ENCOUNTER — OFFICE VISIT (OUTPATIENT)
Dept: NEUROLOGY | Age: 60
End: 2020-10-06
Payer: MEDICARE

## 2020-10-06 VITALS
TEMPERATURE: 98.1 F | HEIGHT: 64 IN | WEIGHT: 175.6 LBS | SYSTOLIC BLOOD PRESSURE: 137 MMHG | DIASTOLIC BLOOD PRESSURE: 76 MMHG | BODY MASS INDEX: 29.98 KG/M2 | HEART RATE: 66 BPM

## 2020-10-06 PROCEDURE — 99214 OFFICE O/P EST MOD 30 MIN: CPT | Performed by: PSYCHIATRY & NEUROLOGY

## 2020-10-06 PROCEDURE — G8427 DOCREV CUR MEDS BY ELIG CLIN: HCPCS | Performed by: PSYCHIATRY & NEUROLOGY

## 2020-10-06 PROCEDURE — G8417 CALC BMI ABV UP PARAM F/U: HCPCS | Performed by: PSYCHIATRY & NEUROLOGY

## 2020-10-06 PROCEDURE — 3017F COLORECTAL CA SCREEN DOC REV: CPT | Performed by: PSYCHIATRY & NEUROLOGY

## 2020-10-06 PROCEDURE — 1036F TOBACCO NON-USER: CPT | Performed by: PSYCHIATRY & NEUROLOGY

## 2020-10-06 PROCEDURE — G8484 FLU IMMUNIZE NO ADMIN: HCPCS | Performed by: PSYCHIATRY & NEUROLOGY

## 2020-10-06 RX ORDER — FLUTICASONE PROPIONATE 50 MCG
1 SPRAY, SUSPENSION (ML) NASAL DAILY
COMMUNITY

## 2020-10-06 RX ORDER — GABAPENTIN 600 MG/1
TABLET ORAL
Qty: 30 TABLET | Refills: 2 | Status: SHIPPED | OUTPATIENT
Start: 2020-10-06 | End: 2020-10-27 | Stop reason: SDUPTHER

## 2020-10-06 RX ORDER — ROPINIROLE 1 MG/1
TABLET, FILM COATED ORAL
Qty: 450 TABLET | Refills: 3 | Status: SHIPPED | OUTPATIENT
Start: 2020-10-06 | End: 2021-03-15 | Stop reason: SDUPTHER

## 2020-10-06 ASSESSMENT — ENCOUNTER SYMPTOMS
EYES NEGATIVE: 1
RESPIRATORY NEGATIVE: 1
ALLERGIC/IMMUNOLOGIC NEGATIVE: 1
GASTROINTESTINAL NEGATIVE: 1

## 2020-10-06 NOTE — PROGRESS NOTES
Subjective:      Patient ID: Karolee Bosworth is a 56 y.o. female. HPI  Active problem left hemispheric infarction with prior aphasia along with right hemiparesis resolved with TPA and left ICA terminus thrombus retrieval with atrial fibrillation, January 2016. Restless legs syndrome readjusting requip . The condition is she remains on coumadin and lipitor 10 mg po qd. There is normal language with no weakness ,numbness , bulbar or visual complaint  . She retains restless legs despite requip taking 1 mg in AM , 2 mg po 6PM and 2 po qhs . She reports that initially this was of some efficacy although despit this restless legs remains a problem at night time when she goes to bed . During the day she finds that medication will keep symptoms abreast . She is on neurontin 300 mg po qhs. . She has bilateral elbow epicondylitis getting intermittent injections through pain management . She is goint to bed from 11 to 11:30 PM falling asleep witin 30 to 60 minutes do to restless legs then having awakening pattern. There are no more transient head pains . Significant medication coumadin ,  lipitor 10 mg po qd, requip 0.5 mg po q 6PM and hs. Previously on pradaxa. She is having  restless legs at night which is interfering with sleep . Requip 0.5 mg po qhs was placed by PMD with some attenuation for the first 2 months . Restless legs complaints will begin by 7 PM when she sits down  . There is mild intermittent snoring with no apnea at night . Significant medication coumadin ,  lipitor 10 mg po qd, requip 1 mg in AM , 2 mg po 6PM and 2 po qhs in evening. Testing Head CT showed dense left MCA artery . CTA with left ICA terminus occlusion . CT perfusion with large left hemispheric penumbra with only small ischemic core left basal ganglia . Patient did very well post intervention returning to normal exam with full motor strength with normal language .  MRI of Head with tiny punctate infarction left caudate along with left frontal periventricular area . Cholesterol 118 , , TG 92, AMY normal, homocysteine normal , Hga1c 5.4 , antithrombin 3 80 (), lupus anticoagulant normal, prothrombin mutation normal , Factor V Leiden, anticardiolipin Ab negative . SAUL EF 50-55% . Moderate to severe MR, moderate TR . Carotid US  normal, 2019      Past Medical History:   Diagnosis Date    Arm pain Right and Left    Atrial fibrillation (HCC)     Atrial flutter (HCC)     Cancer (HCC)     skin    COPD (chronic obstructive pulmonary disease) (HCC)     Depression     Former tobacco use     H/O: CVA (cerebrovascular accident)     2016    Heart palpitations     Hyperlipidemia     Radial tunnel syndrome Right radial tunnel release     Restless legs     Tennis elbow Right and left       Past Surgical History:   Procedure Laterality Date    ABLATION OF DYSRHYTHMIC FOCUS  2020    Dr. Hector Avitia  2016    failed    CARDIOVERSION  2019     SECTION      COLONOSCOPY      WRIST GANGLION EXCISION Right        History reviewed. No pertinent family history.     Social History     Socioeconomic History    Marital status: Single     Spouse name: None    Number of children: None    Years of education: None    Highest education level: None   Occupational History    None   Social Needs    Financial resource strain: None    Food insecurity     Worry: None     Inability: None    Transportation needs     Medical: None     Non-medical: None   Tobacco Use    Smoking status: Former Smoker    Smokeless tobacco: Never Used   Substance and Sexual Activity    Alcohol use: No     Alcohol/week: 0.0 standard drinks    Drug use: No    Sexual activity: None   Lifestyle    Physical activity     Days per week: None     Minutes per session: None    Stress: None   Relationships    Social connections     Talks on phone: None     Gets together: None     Attends Yazdanism service: None     Active member of club or organization: None     Attends meetings of clubs or organizations: None     Relationship status: None    Intimate partner violence     Fear of current or ex partner: None     Emotionally abused: None     Physically abused: None     Forced sexual activity: None   Other Topics Concern    None   Social History Narrative    None       Current Outpatient Medications   Medication Sig Dispense Refill    fluticasone (FLONASE) 50 MCG/ACT nasal spray 1 spray by Each Nostril route daily      rOPINIRole (REQUIP) 1 MG tablet TAKE 1 TABLET BY MOUTH UPON RISING, 2 TABLET BY MOUTH  AT 2 PM AND 2 TABLETS BY  MOUTH AT BEDTIME 450 tablet 3    gabapentin (NEURONTIN) 600 MG tablet Take 1 po qhs 30 tablet 2    spironolactone (ALDACTONE) 25 MG tablet Take 25 mg by mouth daily      Warfarin Sodium (COUMADIN PO) Take 2.5 mg by mouth Daily with supper      Calcium Carb-Cholecalciferol (CALCIUM 600+D) 600-800 MG-UNIT TABS Take 1 tablet by mouth daily      furosemide (LASIX) 40 MG tablet 40 mg 2 times daily 2 qam and 1-2 in the afternoon       atorvastatin (LIPITOR) 10 MG tablet Take 10 mg by mouth nightly       Potassium Chloride Natalie CR (KLOR-CON M20 PO) Take 40 mEq by mouth 2 times daily       Fluticasone-Salmeterol (ADVAIR HFA IN) Inhale 2 puffs into the lungs 2 times daily       amiodarone (CORDARONE) 200 MG tablet Take 200 mg by mouth daily      baclofen (LIORESAL) 20 MG tablet Take 20 mg by mouth daily       desvenlafaxine succinate (PRISTIQ) 100 MG TB24 Take 100 mg by mouth daily      gabapentin (NEURONTIN) 600 MG tablet Take 300 mg by mouth 2 times daily. No current facility-administered medications for this visit. No Known Allergies      Review of Systems   Constitutional: Negative. HENT: Negative. Eyes: Negative. Respiratory: Negative. Cardiovascular: Positive for leg swelling. Gastrointestinal: Negative. Endocrine: Negative. Genitourinary: Negative.     Musculoskeletal: Negative. Allergic/Immunologic: Negative. Hematological: Negative. Psychiatric/Behavioral: The patient is nervous/anxious. Objective:   Physical Exam  Vitals:    10/06/20 1344   BP: 137/76   Pulse: 66   Temp: 98.1 °F (36.7 °C)     weight: 175 lb 9.6 oz (79.7 kg)    Neurological Examination  Ears /Nose/Throat: external ear . Normal exam  Neck and thyroid . Normal size  Respiratory . Breathsounds clear bilaterally  Cardiovascular: Auscultation of heart with regular rate and rhythm   Musculoskeletal. Muscle bulk and tone normal   Muscle strength 5/5 strength throughout   No dysmetria or dysdiadokinesis  No tremor   Normal fine motor  Orientation Alert and oriented x 3   Language process and speech normal . No aphasia   Cranial nerve 2 normal acuety and visual fields  Cranial nerve 3, 4 and 6 . Extraocular muscles are intact . Pupils are equal and reactive   Cranial nerve 5 . Intact corneal reflex. Normal facial sensation  Cranial nerve 7 normal exam   Cranial nerve 8. Grossly intact hearing   Cranial nerve 9 and 10. Symmetric palate elevation   Cranial nerve 11 , 5 out of 5 strength   Cranial Nerve 12 midline tongue . No atrophy  Sensation . Normal pinprick and light touch   Deep Tendon Reflexes normal  Plantar response flexor bilaterally    Assessment:      1. Cerebral infarction due to embolism of left carotid artery ([address]    2. Restless legs syndrome    3.  Atrial fibrillation, unspecified type ([address]    Will have her increase neurontin at bedtime to 60 mg po qhs continuing current requip regimen    Plan:      As above

## 2020-10-20 NOTE — TELEPHONE ENCOUNTER
Pt called in stating at her appt on 10/6 you had instructed her to increase her Gabapentin to 600 mg HS for her RLS and to continue her Requip 1 mg am, 2 mg at 2 pm and 2 mg HS. She stated this is not helping at all. She said that the RLS is still just as bad as before. She is wondering what can be done to help this? Please advise, thanks.

## 2020-10-27 RX ORDER — GABAPENTIN 800 MG/1
TABLET ORAL
Qty: 30 TABLET | Refills: 2 | Status: SHIPPED | OUTPATIENT
Start: 2020-10-27 | End: 2021-01-26

## 2020-10-27 NOTE — TELEPHONE ENCOUNTER
Pt called in about this and I informed her that she should increase her Gabapentin to 800 mg HS. I told her that we will send in a new script for her. She stated her understanding.

## 2020-12-28 ENCOUNTER — VIRTUAL VISIT (OUTPATIENT)
Dept: NEUROLOGY | Age: 60
End: 2020-12-28
Payer: MEDICARE

## 2020-12-28 PROCEDURE — 3017F COLORECTAL CA SCREEN DOC REV: CPT | Performed by: PSYCHIATRY & NEUROLOGY

## 2020-12-28 PROCEDURE — 1036F TOBACCO NON-USER: CPT | Performed by: PSYCHIATRY & NEUROLOGY

## 2020-12-28 PROCEDURE — G8484 FLU IMMUNIZE NO ADMIN: HCPCS | Performed by: PSYCHIATRY & NEUROLOGY

## 2020-12-28 PROCEDURE — G8417 CALC BMI ABV UP PARAM F/U: HCPCS | Performed by: PSYCHIATRY & NEUROLOGY

## 2020-12-28 PROCEDURE — 99214 OFFICE O/P EST MOD 30 MIN: CPT | Performed by: PSYCHIATRY & NEUROLOGY

## 2020-12-28 PROCEDURE — G8427 DOCREV CUR MEDS BY ELIG CLIN: HCPCS | Performed by: PSYCHIATRY & NEUROLOGY

## 2020-12-28 RX ORDER — CLONAZEPAM 0.5 MG/1
TABLET ORAL
Qty: 30 TABLET | Refills: 2 | Status: SHIPPED | OUTPATIENT
Start: 2020-12-28 | End: 2021-03-15 | Stop reason: SDUPTHER

## 2020-12-28 ASSESSMENT — ENCOUNTER SYMPTOMS
RESPIRATORY NEGATIVE: 1
ALLERGIC/IMMUNOLOGIC NEGATIVE: 1
GASTROINTESTINAL NEGATIVE: 1
EYES NEGATIVE: 1

## 2020-12-28 NOTE — PROGRESS NOTES
periventricular area . Cholesterol 118 , , TG 92, AMY normal, homocysteine normal , Hga1c 5.4 , antithrombin 3 80 (), lupus anticoagulant normal, prothrombin mutation normal , Factor V Leiden, anticardiolipin Ab negative . SAUL EF 50-55% . Moderate to severe MR, moderate TR . Carotid US  normal, 2019      Past Medical History:   Diagnosis Date    Arm pain Right and Left    Atrial fibrillation (HCC)     Atrial flutter (HCC)     Cancer (HCC)     skin    COPD (chronic obstructive pulmonary disease) (HCC)     Depression     Former tobacco use     H/O: CVA (cerebrovascular accident)     2016    Heart palpitations     Hyperlipidemia     Radial tunnel syndrome Right radial tunnel release     Restless legs     Tennis elbow Right and left       Past Surgical History:   Procedure Laterality Date    ABLATION OF DYSRHYTHMIC FOCUS  2020    Dr. Ny Weaver  2016    failed    CARDIOVERSION  2019     SECTION      COLONOSCOPY      WRIST GANGLION EXCISION Right        History reviewed. No pertinent family history.     Social History     Socioeconomic History    Marital status: Single     Spouse name: None    Number of children: None    Years of education: None    Highest education level: None   Occupational History    None   Social Needs    Financial resource strain: None    Food insecurity     Worry: None     Inability: None    Transportation needs     Medical: None     Non-medical: None   Tobacco Use    Smoking status: Former Smoker    Smokeless tobacco: Never Used   Substance and Sexual Activity    Alcohol use: No     Alcohol/week: 0.0 standard drinks    Drug use: No    Sexual activity: None   Lifestyle    Physical activity     Days per week: None     Minutes per session: None    Stress: None   Relationships    Social connections     Talks on phone: None     Gets together: None     Attends Sabianist service: None     Active member of club or organization: None     Attends meetings of clubs or organizations: None     Relationship status: None    Intimate partner violence     Fear of current or ex partner: None     Emotionally abused: None     Physically abused: None     Forced sexual activity: None   Other Topics Concern    None   Social History Narrative    None       Current Outpatient Medications   Medication Sig Dispense Refill    clonazePAM (KLONOPIN) 0.5 MG tablet Take 1 po qhs 30 tablet 2    gabapentin (NEURONTIN) 800 MG tablet Take 1 po qhs 30 tablet 2    fluticasone (FLONASE) 50 MCG/ACT nasal spray 1 spray by Each Nostril route daily      rOPINIRole (REQUIP) 1 MG tablet TAKE 1 TABLET BY MOUTH UPON RISING, 2 TABLET BY MOUTH  AT 2 PM AND 2 TABLETS BY  MOUTH AT BEDTIME 450 tablet 3    spironolactone (ALDACTONE) 25 MG tablet Take 25 mg by mouth daily      Warfarin Sodium (COUMADIN PO) Take 2.5 mg by mouth Daily with supper      Calcium Carb-Cholecalciferol (CALCIUM 600+D) 600-800 MG-UNIT TABS Take 1 tablet by mouth 2 times daily       furosemide (LASIX) 40 MG tablet 40 mg 2 times daily 2 qam and 1-2 in the afternoon       atorvastatin (LIPITOR) 10 MG tablet Take 10 mg by mouth nightly       Potassium Chloride Natalie CR (KLOR-CON M20 PO) Take 40 mEq by mouth 2 times daily       Fluticasone-Salmeterol (ADVAIR HFA IN) Inhale 2 puffs into the lungs 2 times daily       amiodarone (CORDARONE) 200 MG tablet Take 200 mg by mouth 2 times daily       baclofen (LIORESAL) 20 MG tablet Take 20 mg by mouth daily       desvenlafaxine succinate (PRISTIQ) 100 MG TB24 Take 100 mg by mouth daily       No current facility-administered medications for this visit. No Known Allergies      Review of Systems   Constitutional: Negative. HENT: Negative. Eyes: Negative. Respiratory: Negative. Cardiovascular: Positive for leg swelling. Gastrointestinal: Negative. Endocrine: Negative. Genitourinary: Negative.     Musculoskeletal: Negative. Allergic/Immunologic: Negative. Hematological: Negative. Psychiatric/Behavioral: The patient is nervous/anxious. Objective:   Physical Exam  There were no vitals filed for this visit. weight:      Neurological Examination  Ears /Nose/Throat: external ear . Normal exam  Neck and thyroid . Normal size  Respiratory . Breathsounds clear bilaterally  Cardiovascular: Auscultation of heart with regular rate and rhythm   Musculoskeletal. Muscle bulk and tone normal   Muscle strength 5/5 strength throughout   No dysmetria or dysdiadokinesis  No tremor   Normal fine motor  Orientation Alert and oriented x 3   Language process and speech normal . No aphasia   Cranial nerve 2 normal acuety and visual fields  Cranial nerve 3, 4 and 6 . Extraocular muscles are intact . Pupils are equal and reactive   Cranial nerve 5 . Intact corneal reflex. Normal facial sensation  Cranial nerve 7 normal exam   Cranial nerve 8. Grossly intact hearing   Cranial nerve 9 and 10. Symmetric palate elevation   Cranial nerve 11 , 5 out of 5 strength   Cranial Nerve 12 midline tongue . No atrophy  Sensation . Normal pinprick and light touch   Deep Tendon Reflexes normal  Plantar response flexor bilaterally    Assessment:      1. Restless legs syndrome    2. Cerebral infarction due to embolism of left carotid artery ([address]    3. Atrial fibrillation, unspecified type ([address]    Will add klonopin 0.5 mg po qhs having her maintain current requip and neurontin regimen     Plan:      As above       Crystal Null is a 56 y.o. female being evaluated by a Virtual Visit (video visit) encounter to address concerns as mentioned above. A caregiver was present when appropriate. Due to this being a TeleHealth encounter (During EJBJF-37 public health emergency), evaluation of the following organ systems was limited: Vitals/Constitutional/EENT/Resp/CV/GI//MS/Neuro/Skin/Heme-Lymph-Imm.   Pursuant to the emergency declaration under the [address] and the [employer] Act, [facility] waiver authority and the Coronavirus Preparedness and [employer] Act, this Virtual Visit was conducted with patient's (and/or legal guardian's) consent, to reduce the patient's risk of exposure to COVID-19 and provide necessary medical care. The patient (and/or legal guardian) has also been advised to contact this office for worsening conditions or problems, and seek emergency medical treatment and/or call 911 if deemed necessary. Patient identification was verified at the start of the visit: Yes    Total time spent for this encounter: 20 minutes     Services were provided through a video synchronous discussion virtually to substitute for in-person clinic visit. Patient and provider were located at their individual homes. --Unique Bolivar MD on 12/28/2020 at 10:54 AM    An electronic signature was used to authenticate this note.

## 2021-01-26 RX ORDER — GABAPENTIN 800 MG/1
TABLET ORAL
Qty: 30 TABLET | Refills: 2 | Status: SHIPPED | OUTPATIENT
Start: 2021-01-26 | End: 2021-05-25

## 2021-03-15 ENCOUNTER — OFFICE VISIT (OUTPATIENT)
Dept: NEUROLOGY | Age: 61
End: 2021-03-15
Payer: MEDICARE

## 2021-03-15 VITALS
HEART RATE: 65 BPM | WEIGHT: 171.4 LBS | SYSTOLIC BLOOD PRESSURE: 112 MMHG | TEMPERATURE: 97.3 F | DIASTOLIC BLOOD PRESSURE: 65 MMHG | HEIGHT: 64 IN | BODY MASS INDEX: 29.26 KG/M2

## 2021-03-15 DIAGNOSIS — I48.91 ATRIAL FIBRILLATION, UNSPECIFIED TYPE (HCC): ICD-10-CM

## 2021-03-15 DIAGNOSIS — I63.132 CEREBRAL INFARCTION DUE TO EMBOLISM OF LEFT CAROTID ARTERY (HCC): Primary | ICD-10-CM

## 2021-03-15 DIAGNOSIS — G25.81 RESTLESS LEGS SYNDROME: ICD-10-CM

## 2021-03-15 PROCEDURE — G8417 CALC BMI ABV UP PARAM F/U: HCPCS | Performed by: PSYCHIATRY & NEUROLOGY

## 2021-03-15 PROCEDURE — 99214 OFFICE O/P EST MOD 30 MIN: CPT | Performed by: PSYCHIATRY & NEUROLOGY

## 2021-03-15 PROCEDURE — 3017F COLORECTAL CA SCREEN DOC REV: CPT | Performed by: PSYCHIATRY & NEUROLOGY

## 2021-03-15 PROCEDURE — G8427 DOCREV CUR MEDS BY ELIG CLIN: HCPCS | Performed by: PSYCHIATRY & NEUROLOGY

## 2021-03-15 PROCEDURE — G8484 FLU IMMUNIZE NO ADMIN: HCPCS | Performed by: PSYCHIATRY & NEUROLOGY

## 2021-03-15 PROCEDURE — 1036F TOBACCO NON-USER: CPT | Performed by: PSYCHIATRY & NEUROLOGY

## 2021-03-15 RX ORDER — ROPINIROLE 1 MG/1
TABLET, FILM COATED ORAL
Qty: 630 TABLET | Refills: 3 | Status: SHIPPED | OUTPATIENT
Start: 2021-03-15 | End: 2022-04-04

## 2021-03-15 RX ORDER — CLONAZEPAM 0.5 MG/1
TABLET ORAL
Qty: 90 TABLET | Refills: 0 | Status: SHIPPED | OUTPATIENT
Start: 2021-03-15 | End: 2021-06-01

## 2021-03-15 ASSESSMENT — ENCOUNTER SYMPTOMS
RESPIRATORY NEGATIVE: 1
GASTROINTESTINAL NEGATIVE: 1
ALLERGIC/IMMUNOLOGIC NEGATIVE: 1
EYES NEGATIVE: 1

## 2021-03-15 NOTE — PROGRESS NOTES
Subjective:      Patient ID: Dl Valentin is a 56 y.o. female. HPI  Active problem restless legs syndrome on requip and neurontin adding klonopin 0.5 mg po qhs . Left hemispheric infarction with prior aphasia along with right hemiparesis resolved with TPA and left ICA terminus thrombus retrieval with atrial fibrillation, January 2016. The condition is she is doing better at night athough is still having issue wit restless during evening . She is going to beat 11:30 to 12 AM taking 1 hour to fall asleep awakening 4 times able to fall needing to get up with restless legs complaints . She is tolerating klonopin well  . She is on on requip 1 mg in AM , 2 mg po 6PM and 2 po qhs and neurontin 800 mg po qhs. Issues with restless legs will be 6 to 7PM in evening being disruptive . She remains on coumadin and lipitor 10 mg po qd. There is normal language with no weakness ,numbness , bulbar or visual complaint  . There are no more transient head pains . Significant medication coumadin ,  lipitor 10 mg po qd, requip 1 mg in AM , 2 mg po 6PM and 2 po qhs in evening, neurontin 800 mg po qhs, klonopin 0.5 mg po qhs  Testing Head CT showed dense left MCA artery . CTA with left ICA terminus occlusion . CT perfusion with large left hemispheric penumbra with only small ischemic core left basal ganglia . Patient did very well post intervention returning to normal exam with full motor strength with normal language . MRI of Head with tiny punctate infarction left caudate along with left frontal periventricular area . Cholesterol 118 , , TG 92, AMY normal, homocysteine normal , Hga1c 5.4 , antithrombin 3 80 (), lupus anticoagulant normal, prothrombin mutation normal , Factor V Leiden, anticardiolipin Ab negative . SAUL EF 50-55% . Moderate to severe MR, moderate TR .  Carotid US  normal, September 2019      Past Medical History:   Diagnosis Date    Arm pain Right and Left    Atrial fibrillation (HCC)     [facility] fl[facility]     Cancer ([facility]     skin    COPD (chronic obstructive pulmonary disease) ([facility]     Depression     Former tobacco use     H/O: CVA (cerebrovascular accident)     2016    Heart palpitations     Hyperlipidemia     Radial tunnel syndrome Right radial tunnel release     Restless legs     Tennis elbow Right and left       Past Surgical History:   Procedure Laterality Date    ABLATION OF DYSRHYTHMIC FOCUS  2020    Dr. Dmitri Woodson  2016    failed    CARDIOVERSION  2019     SECTION      COLONOSCOPY      WRIST GANGLION EXCISION Right        History reviewed. No pertinent family history.     Social History     Socioeconomic History    Marital status: Single     Spouse name: None    Number of children: None    Years of education: None    Highest education level: None   Occupational History    None   Social Needs    Financial resource strain: None    Food insecurity     Worry: None     Inability: None    Transportation needs     Medical: None     Non-medical: None   Tobacco Use    Smoking status: Never Smoker    Smokeless tobacco: Never Used   Substance and Sexual Activity    Alcohol use: No     Alcohol/week: 0.0 standard drinks    Drug use: No    Sexual activity: None   Lifestyle    Physical activity     Days per week: None     Minutes per session: None    Stress: None   Relationships    Social connections     Talks on phone: None     Gets together: None     Attends Zoroastrian service: None     Active member of club or organization: None     Attends meetings of clubs or organizations: None     Relationship status: None    Intimate partner violence     Fear of current or ex partner: None     Emotionally abused: None     Physically abused: None     Forced sexual activity: None   Other Topics Concern    None   Social History Narrative    None       Current Outpatient Medications   Medication Sig Dispense Refill    rOPINIRole (REQUIP) 1 MG tablet TAKE 1 TABLET BY MOUTH UPON RISING, 3 TABLET BY MOUTH  AT 6 PM AND 3 TABLETS BY  MOUTH AT BEDTIME 630 tablet 3    clonazePAM (KLONOPIN) 0.5 MG tablet Take 1 po qhs 90 tablet 0    gabapentin (NEURONTIN) 800 MG tablet TAKE ONE TABLET BY MOUTH EVERY NIGHT AT BEDTIME 30 tablet 2    fluticasone (FLONASE) 50 MCG/ACT nasal spray 1 spray by Each Nostril route daily      spironolactone (ALDACTONE) 25 MG tablet Take 25 mg by mouth daily      Warfarin Sodium (COUMADIN PO) Take 2.5 mg by mouth Daily with supper      Calcium Carb-Cholecalciferol (CALCIUM 600+D) 600-800 MG-UNIT TABS Take 1 tablet by mouth 2 times daily       furosemide (LASIX) 40 MG tablet 40 mg 2 times daily 2 qam and 1-2 in the afternoon       atorvastatin (LIPITOR) 10 MG tablet Take 10 mg by mouth nightly       Potassium Chloride Natalie CR (KLOR-CON M20 PO) Take 40 mEq by mouth 2 times daily       Fluticasone-Salmeterol (ADVAIR HFA IN) Inhale 2 puffs into the lungs 2 times daily       amiodarone (CORDARONE) 200 MG tablet Take 200 mg by mouth 2 times daily       baclofen (LIORESAL) 20 MG tablet Take 20 mg by mouth daily       desvenlafaxine succinate (PRISTIQ) 100 MG TB24 Take 100 mg by mouth daily       No current facility-administered medications for this visit. No Known Allergies      Review of Systems   Constitutional: Negative. HENT: Negative. Eyes: Negative. Respiratory: Negative. Cardiovascular: Positive for leg swelling. Gastrointestinal: Negative. Endocrine: Negative. Genitourinary: Negative. Musculoskeletal: Negative. Allergic/Immunologic: Negative. Hematological: Negative. Psychiatric/Behavioral: The patient is nervous/anxious. Objective:   Physical Exam  Vitals:    03/15/21 0940   BP: 112/65   Pulse: 65   Temp: 97.3 °F (36.3 °C)     weight: 171 lb 6.4 oz (77.7 kg)    Neurological Examination  Ears /Nose/Throat: external ear . Normal exam  Neck and thyroid . Normal size  Respiratory . Breathsounds

## 2021-05-25 RX ORDER — GABAPENTIN 800 MG/1
TABLET ORAL
Qty: 30 TABLET | Refills: 0 | Status: SHIPPED | OUTPATIENT
Start: 2021-05-25 | End: 2021-06-28

## 2021-05-25 NOTE — TELEPHONE ENCOUNTER
Pharmacy requesting refill of Gabapentin 800 mg. Medication active on med list yes      Date last ordered: 1/26/21 with 2 refills  verified on 5/25/2021  verified by DORA Ware LPN      Date of last appointment 3/15/2021    Next Visit Date:  6/22/2021

## 2021-05-29 DIAGNOSIS — G25.81 RESTLESS LEGS SYNDROME: ICD-10-CM

## 2021-06-01 RX ORDER — CLONAZEPAM 0.5 MG/1
TABLET ORAL
Qty: 90 TABLET | Refills: 0 | Status: SHIPPED | OUTPATIENT
Start: 2021-06-01 | End: 2021-08-17

## 2021-06-01 NOTE — TELEPHONE ENCOUNTER
Pharmacy requesting refill of clonazepam.      Medication active on med list yes      Date of last fill: 3/15/21 90d  with 0 refills verified on 6/1/21  verified by DOROTHY DHILLON      Date of last appointment 3/15/21    Next Visit Date:  6/22/2021

## 2021-06-22 ENCOUNTER — OFFICE VISIT (OUTPATIENT)
Dept: NEUROLOGY | Age: 61
End: 2021-06-22
Payer: MEDICARE

## 2021-06-22 VITALS
DIASTOLIC BLOOD PRESSURE: 74 MMHG | BODY MASS INDEX: 28.34 KG/M2 | TEMPERATURE: 97.3 F | SYSTOLIC BLOOD PRESSURE: 132 MMHG | HEIGHT: 64 IN | HEART RATE: 70 BPM | WEIGHT: 166 LBS

## 2021-06-22 DIAGNOSIS — G47.33 OBSTRUCTIVE SLEEP APNEA SYNDROME: ICD-10-CM

## 2021-06-22 DIAGNOSIS — G25.81 RESTLESS LEGS SYNDROME: Primary | ICD-10-CM

## 2021-06-22 DIAGNOSIS — I48.91 ATRIAL FIBRILLATION, UNSPECIFIED TYPE (HCC): ICD-10-CM

## 2021-06-22 PROCEDURE — G8417 CALC BMI ABV UP PARAM F/U: HCPCS | Performed by: PSYCHIATRY & NEUROLOGY

## 2021-06-22 PROCEDURE — 1036F TOBACCO NON-USER: CPT | Performed by: PSYCHIATRY & NEUROLOGY

## 2021-06-22 PROCEDURE — 3017F COLORECTAL CA SCREEN DOC REV: CPT | Performed by: PSYCHIATRY & NEUROLOGY

## 2021-06-22 PROCEDURE — 99214 OFFICE O/P EST MOD 30 MIN: CPT | Performed by: PSYCHIATRY & NEUROLOGY

## 2021-06-22 PROCEDURE — G8427 DOCREV CUR MEDS BY ELIG CLIN: HCPCS | Performed by: PSYCHIATRY & NEUROLOGY

## 2021-06-22 ASSESSMENT — ENCOUNTER SYMPTOMS
EYES NEGATIVE: 1
RESPIRATORY NEGATIVE: 1
GASTROINTESTINAL NEGATIVE: 1
ALLERGIC/IMMUNOLOGIC NEGATIVE: 1

## 2021-06-22 NOTE — PROGRESS NOTES
Subjective:      Patient ID: Lorenzo Sexton is a 59 y.o. female. HPI  Active problem restless legs syndrome readjusting requip on neurontin and klonopin. Left hemispheric infarction with prior aphasia along with right hemiparesis resolved with TPA and left ICA terminus thrombus retrieval with atrial fibrillation, January 2016. The condition is she reports to still be having restless legs moreso at night . During the day if she sits over 30 minurtes she may have restless legs which will attenate on getting up and walking this off . She is going to bed at 11:30 PM falling asleep within 1 hour moreso do to restless legs with legs becoming fidgety being disruptive needing to get up and sit in chair . There will be four awakening at night needing to get up with restless legs complaints . She is on klonopin 0.5 mg po qhs, requip 1 mg in AM , 3 mg po 6PM and 3 po qhs and neurontin 800 mg po qhs. During waking day she is tired and sleepy . There is mild snoring. She remains on coumadin and lipitor 10 mg po qd. There is normal language with no weakness ,numbness , bulbar or visual complaint  . There are no more transient head pains . Significant medication coumadin ,  lipitor 10 mg po qd, requip 1 mg in AM , 3 mg po 6PM and 3 po qhs in evening, neurontin 800 mg po qhs, klonopin 0.5 mg po qhs  Testing Head CT showed dense left MCA artery . CTA with left ICA terminus occlusion . CT perfusion with large left hemispheric penumbra with only small ischemic core left basal ganglia . Patient did very well post intervention returning to normal exam with full motor strength with normal language . MRI of Head with tiny punctate infarction left caudate along with left frontal periventricular area . Cholesterol 118 , , TG 92, AMY normal, homocysteine normal , Hga1c 5.4 , antithrombin 3 80 (), lupus anticoagulant normal, prothrombin mutation normal , Factor V Leiden, anticardiolipin Ab negative . SAUL EF 50-55% .  Moderate to severe MR, moderate TR . Carotid US  normal, 2019      Past Medical History:   Diagnosis Date    Arm pain Right and Left    Atrial fibrillation (HCC)     Atrial flutter (HCC)     Cancer (HCC)     skin    COPD (chronic obstructive pulmonary disease) (HCC)     Depression     Former tobacco use     H/O: CVA (cerebrovascular accident)     2016    Heart palpitations     Hyperlipidemia     Radial tunnel syndrome Right radial tunnel release     Restless legs     Tennis elbow Right and left       Past Surgical History:   Procedure Laterality Date    ABLATION OF DYSRHYTHMIC FOCUS  2020    Dr. Roly Medina  2016    failed    CARDIOVERSION  2019     SECTION      COLONOSCOPY      WRIST GANGLION EXCISION Right        History reviewed. No pertinent family history. Social History     Socioeconomic History    Marital status:      Spouse name: None    Number of children: None    Years of education: None    Highest education level: None   Occupational History    None   Tobacco Use    Smoking status: Never Smoker    Smokeless tobacco: Never Used   Vaping Use    Vaping Use: Never used   Substance and Sexual Activity    Alcohol use: No     Alcohol/week: 0.0 standard drinks    Drug use: No    Sexual activity: None   Other Topics Concern    None   Social History Narrative    None     Social Determinants of Health     Financial Resource Strain:     Difficulty of Paying Living Expenses:    Food Insecurity:     Worried About Running Out of Food in the Last Year:     Ran Out of Food in the Last Year:    Transportation Needs:     Lack of Transportation (Medical):      Lack of Transportation (Non-Medical):    Physical Activity:     Days of Exercise per Week:     Minutes of Exercise per Session:    Stress:     Feeling of Stress :    Social Connections:     Frequency of Communication with Friends and Family:     Frequency of Social Gatherings with Friends and Family:     Attends [employer] Services:     Active Member of Clubs or Organizations:     Attends Club or Organization Meetings:     Marital Status:    Intimate Partner Violence:     Fear of Current or Ex-Partner:     Emotionally Abused:     Physically Abused:     Sexually Abused:        Current Outpatient Medications   Medication Sig Dispense Refill    clonazePAM (KLONOPIN) 0.5 MG tablet TAKE 1 TABLET BY MOUTH  EVERY NIGHT AT BEDTIME 90 tablet 0    gabapentin (NEURONTIN) 800 MG tablet TAKE ONE TABLET BY MOUTH EVERY NIGHT AT BEDTIME 30 tablet 0    rOPINIRole (REQUIP) 1 MG tablet TAKE 1 TABLET BY MOUTH UPON RISING, 3 TABLET BY MOUTH  AT 6 PM AND 3 TABLETS BY  MOUTH AT BEDTIME 630 tablet 3    fluticasone (FLONASE) 50 MCG/ACT nasal spray 1 spray by Each Nostril route daily      spironolactone (ALDACTONE) 25 MG tablet Take 25 mg by mouth daily      Warfarin Sodium (COUMADIN PO) Take 2.5 mg by mouth Daily with supper      Calcium Carb-Cholecalciferol (CALCIUM 600+D) 600-800 MG-UNIT TABS Take 1 tablet by mouth 2 times daily       furosemide (LASIX) 40 MG tablet 40 mg 2 times daily 2 qam and 1-2 in the afternoon       atorvastatin (LIPITOR) 10 MG tablet Take 10 mg by mouth nightly       Potassium Chloride Natalie CR (KLOR-CON M20 PO) Take 40 mEq by mouth 2 times daily       Fluticasone-Salmeterol (ADVAIR HFA IN) Inhale 2 puffs into the lungs 2 times daily       baclofen (LIORESAL) 20 MG tablet Take 20 mg by mouth daily       desvenlafaxine succinate (PRISTIQ) 100 MG TB24 Take 100 mg by mouth daily      amiodarone (CORDARONE) 200 MG tablet Take 200 mg by mouth 2 times daily        No current facility-administered medications for this visit. No Known Allergies      Review of Systems   Constitutional: Negative. HENT: Negative. Eyes: Negative. Respiratory: Negative. Cardiovascular: Positive for leg swelling. Gastrointestinal: Negative. Endocrine: Negative.     Genitourinary: Negative. Musculoskeletal: Negative. Allergic/Immunologic: Negative. Hematological: Negative. Psychiatric/Behavioral: The patient is nervous/anxious. Objective:   Physical Exam  Vitals:    06/22/21 1535   BP: 132/74   Pulse: 70   Temp: 97.3 °F (36.3 °C)     weight: 166 lb (75.3 kg)    Neurological Examination  Ears /Nose/Throat: external ear . Normal exam  Neck and thyroid . Normal size  Respiratory . Breathsounds clear bilaterally  Cardiovascular: Auscultation of heart with regular rate and rhythm   Musculoskeletal. Muscle bulk and tone normal   Muscle strength 5/5 strength throughout   No dysmetria or dysdiadokinesis  No tremor   Normal fine motor  Orientation Alert and oriented x 3   Language process and speech normal . No aphasia   Cranial nerve 2 normal acuety and visual fields  Cranial nerve 3, 4 and 6 . Extraocular muscles are intact . Pupils are equal and reactive   Cranial nerve 5 . Intact corneal reflex. Normal facial sensation  Cranial nerve 7 normal exam   Cranial nerve 8. Grossly intact hearing   Cranial nerve 9 and 10. Symmetric palate elevation   Cranial nerve 11 , 5 out of 5 strength   Cranial Nerve 12 midline tongue . No atrophy  Sensation . Normal pinprick and light touch   Deep Tendon Reflexes normal  Plantar response flexor bilaterally    Assessment:      1. Restless legs syndrome    2. Atrial fibrillation, unspecified type ([address]    3. Obstructive sleep apnea syndrome    Will have her increase klonopin to 0.5 mg po qhs staying on and neurontin having her undergo sleep study with suspicion of possible sleep apnea      Plan:      Orders Placed This Encounter   Procedures    Baseline Diagnostic Sleep Study     Standing Status:   Future     Standing Expiration Date:   6/22/2022     Order Specific Question:   Adult or Pediatric     Answer:   Adult Study (>7 Years)     Order Specific Question:   Location For Sleep Study     Answer:    [facility] Specific Question:   Select Sleep Lab Location     Answer:   [address]     Order Specific Question:   Pre-Study Patient Questions:      Answer:   Complains of daytime sleepiness

## 2021-06-28 RX ORDER — GABAPENTIN 800 MG/1
TABLET ORAL
Qty: 30 TABLET | Refills: 2 | Status: SHIPPED | OUTPATIENT
Start: 2021-06-28 | End: 2021-09-27

## 2021-06-28 NOTE — TELEPHONE ENCOUNTER
Pharmacy requesting refill of gabapentin.       Medication active on med list yes      Date of last fill: 5/25/21  with 0 refills verified on 6/28/21  verified by ALEJO RN      Date of last appointment 6/22/21    Next Visit Date:  9/16/2021

## 2021-08-01 ENCOUNTER — HOSPITAL ENCOUNTER (OUTPATIENT)
Dept: SLEEP CENTER | Age: 61
Discharge: HOME | End: 2021-08-03
Payer: MEDICARE

## 2021-08-01 VITALS — TEMPERATURE: 96.9 F

## 2021-08-01 DIAGNOSIS — G47.33 OBSTRUCTIVE SLEEP APNEA SYNDROME: ICD-10-CM

## 2021-08-01 PROCEDURE — 95810 POLYSOM 6/> YRS 4/> PARAM: CPT

## 2021-08-13 LAB — STATUS: NORMAL

## 2021-08-13 PROCEDURE — 95810 POLYSOM 6/> YRS 4/> PARAM: CPT | Performed by: PSYCHIATRY & NEUROLOGY

## 2021-08-14 DIAGNOSIS — G25.81 RESTLESS LEGS SYNDROME: ICD-10-CM

## 2021-08-16 NOTE — TELEPHONE ENCOUNTER
Pharmacy requesting refill of clonazepam.      Medication active on med list yes      Date of last fill: 6/1/21 90d  with 0 refills verified on 8/16/21  verified by ALEJO RN  This is [facility] .       Date of last appointment 6/22/21    Next Visit Date:  9/16/2021

## 2021-08-17 RX ORDER — CLONAZEPAM 0.5 MG/1
TABLET ORAL
Qty: 90 TABLET | Refills: 0 | Status: SHIPPED | OUTPATIENT
Start: 2021-08-17 | End: 2021-08-24 | Stop reason: SDUPTHER

## 2021-08-17 NOTE — TELEPHONE ENCOUNTER
Janett Granado this is a patient of Dr. Rhina Messina. Pharmacy sent a request for a new Clonazepam Rx. Dr. Tahir Escobedo is currently out of the office for two weeks. Would you please approve this Rx for the patient.         Medication active on med list: yes      Date of last fill: 6/1/21 for #90 NR  verified on 8/17/2021    verified by Lianne Syed, SHARDA      Date of last appointment: 6/22/2021    Next Visit Date:  9/16/2021

## 2021-08-23 DIAGNOSIS — G25.81 RESTLESS LEGS SYNDROME: ICD-10-CM

## 2021-08-23 NOTE — TELEPHONE ENCOUNTER
Harry Jayson called the office this afternoon regarding her Klonopin. Patient stated that at her follow up in June Dr. Anna Prieto told her he was going to increase her Klonopin dose from 0.5 mg to 1 mg nightly and send a new Rx to [facility]. Patient stated that she contacted [facility] and was told that he didn't send a new Rx in. I let her know that our NP Parag Gonzalez had just approved a new Klonopin Rx on 8/17/21, however this was for the 0.5 mg nightly dose. I told Sima that Dr. Anna Prieto was out of the office this week, but I would check with him and let her know. Patient stated that [facility] told her she would be eligible for her Klonopin tomorrow and they could overnight the medication.

## 2021-08-24 RX ORDER — CLONAZEPAM 0.5 MG/1
TABLET ORAL
Qty: 14 TABLET | Refills: 0 | Status: SHIPPED | OUTPATIENT
Start: 2021-08-24 | End: 2021-08-30 | Stop reason: SDUPTHER

## 2021-08-30 RX ORDER — CLONAZEPAM 0.5 MG/1
TABLET ORAL
Qty: 180 TABLET | Refills: 0 | Status: SHIPPED | OUTPATIENT
Start: 2021-08-30 | End: 2021-12-01

## 2021-08-30 RX ORDER — CLONAZEPAM 0.5 MG/1
TABLET ORAL
Qty: 28 TABLET | Refills: 0 | Status: SHIPPED
Start: 2021-08-30 | End: 2021-11-08 | Stop reason: SDUPTHER

## 2021-08-30 NOTE — TELEPHONE ENCOUNTER
This was discussed with Dr. Caryl Gates this morning. Dr. Caryl Gates did approve the Klonopin dose increase to 1 mg q hs. Call placed to Mrs. Ronny Arnett and a message left on her VM asking for a return call. Will confirm what local pharmacy she would like to use as well. Mrs. Ronny Arnett called back and confirmed that she would like to use [facility] in [place]. Two week local supply will be sent to Dr. Caryl Gates for his approval as well as a 90 day Rx to mail away [facility].         Medication active on med list: yes      Date of last fill: 8/24/21 for #14 and NR  verified on 8/30/2021    verified by Demarcus Rai., SHARDA      Date of last appointment: 6/22/2021    Next Visit Date:  9/16/2021

## 2021-08-31 ENCOUNTER — TELEPHONE (OUTPATIENT)
Dept: NEUROLOGY | Age: 61
End: 2021-08-31

## 2021-08-31 NOTE — TELEPHONE ENCOUNTER
----- Message from Kevin June MD sent at 8/13/2021  7:52 PM EDT -----  Yuliet Jones let pt know no sleep apnea is seen in sleep study

## 2021-08-31 NOTE — TELEPHONE ENCOUNTER
Call placed to Mr. Jara Hayleylily yesterday morning regarding another issue. This information was given to her at that time. Patient verbally stated her understanding.

## 2021-09-27 RX ORDER — GABAPENTIN 800 MG/1
TABLET ORAL
Qty: 30 TABLET | Refills: 2 | Status: SHIPPED | OUTPATIENT
Start: 2021-09-27 | End: 2022-01-07

## 2021-10-05 ENCOUNTER — HOSPITAL ENCOUNTER (OUTPATIENT)
Dept: CARDIAC CATH/INVASIVE PROCEDURES | Age: 61
Discharge: HOME | End: 2021-10-05
Payer: MEDICARE

## 2021-10-05 VITALS
TEMPERATURE: 98.1 F | WEIGHT: 156 LBS | HEIGHT: 64 IN | BODY MASS INDEX: 26.63 KG/M2 | RESPIRATION RATE: 14 BRPM | OXYGEN SATURATION: 100 % | SYSTOLIC BLOOD PRESSURE: 96 MMHG | DIASTOLIC BLOOD PRESSURE: 47 MMHG | HEART RATE: 55 BPM

## 2021-10-05 LAB
GFR NON-AFRICAN AMERICAN: 48 ML/MIN
GFR SERPL CREATININE-BSD FRML MDRD: 58 ML/MIN
GFR SERPL CREATININE-BSD FRML MDRD: ABNORMAL ML/MIN/{1.73_M2}
GLUCOSE BLD-MCNC: 95 MG/DL (ref 74–100)
LV EF: 55 %
LVEF MODALITY: NORMAL
POC BUN: 22 MG/DL (ref 8–26)
POC CHLORIDE: 104 MMOL/L (ref 98–107)
POC CREATININE: 1.14 MG/DL (ref 0.51–1.19)
POC HEMATOCRIT: 51 % (ref 36–46)
POC HEMOGLOBIN: 17.3 G/DL (ref 12–16)
POC INR: 2.3
POC POTASSIUM: 4.4 MMOL/L (ref 3.5–4.5)
POC SODIUM: 144 MMOL/L (ref 138–146)
PROTHROMBIN TIME, POC: 27 SEC (ref 10.4–14.2)

## 2021-10-05 PROCEDURE — 93312 ECHO TRANSESOPHAGEAL: CPT

## 2021-10-05 PROCEDURE — 82435 ASSAY OF BLOOD CHLORIDE: CPT

## 2021-10-05 PROCEDURE — 2580000003 HC RX 258: Performed by: INTERNAL MEDICINE

## 2021-10-05 PROCEDURE — 84132 ASSAY OF SERUM POTASSIUM: CPT

## 2021-10-05 PROCEDURE — 85610 PROTHROMBIN TIME: CPT

## 2021-10-05 PROCEDURE — 7100000001 HC PACU RECOVERY - ADDTL 15 MIN

## 2021-10-05 PROCEDURE — 76377 3D RENDER W/INTRP POSTPROCES: CPT

## 2021-10-05 PROCEDURE — 93325 DOPPLER ECHO COLOR FLOW MAPG: CPT

## 2021-10-05 PROCEDURE — 82565 ASSAY OF CREATININE: CPT

## 2021-10-05 PROCEDURE — 6360000002 HC RX W HCPCS

## 2021-10-05 PROCEDURE — 7100000000 HC PACU RECOVERY - FIRST 15 MIN

## 2021-10-05 PROCEDURE — 84520 ASSAY OF UREA NITROGEN: CPT

## 2021-10-05 PROCEDURE — 82947 ASSAY GLUCOSE BLOOD QUANT: CPT

## 2021-10-05 PROCEDURE — 93320 DOPPLER ECHO COMPLETE: CPT

## 2021-10-05 PROCEDURE — 84295 ASSAY OF SERUM SODIUM: CPT

## 2021-10-05 PROCEDURE — 85014 HEMATOCRIT: CPT

## 2021-10-05 RX ORDER — SODIUM CHLORIDE 9 MG/ML
INJECTION, SOLUTION INTRAVENOUS CONTINUOUS
Status: DISCONTINUED | OUTPATIENT
Start: 2021-10-05 | End: 2021-10-06 | Stop reason: HOSPADM

## 2021-10-05 RX ADMIN — SODIUM CHLORIDE: 9 INJECTION, SOLUTION INTRAVENOUS at 09:55

## 2021-10-05 NOTE — H&P
[facility] Cardiology Cardiology   History & Physical               Today's Date: 10/5/2021  Patient Name: Rosemarie Bear  Date of admission: 10/5/2021  8:18 AM  Patient's age: 59 y.o., 1960  Admission Dx: elsa  mr    Reason for Admission:  ELSA    CHIEF COMPLAINT:  Severe MR on TTE  No chief complaint on file. History Obtained From:  patient, electronic medical record    HISTORY OF PRESENT ILLNESS:    59year old female was seen in Dr. Josefina Rojas outpatient clinic for symptoms suggestive of congestive heart failure. As part of further work up a TTE was ordered and was noted to have a normal EF with severe MR with eccentric jet. Ms Gee Alvarado presents today for a ELSA for further work up/evaluation of the severe MR noted on TTE. Reports no specific complaints today. Past Medical History:   has a past medical history of Arm pain, Atrial fibrillation ([address] Atrial flutter ([address] Cancer ([address] COPD (chronic obstructive pulmonary disease) ([address] Depression, Former tobacco use, H/O: CVA (cerebrovascular accident), Heart palpitations, Hyperlipidemia, Radial tunnel syndrome, Restless legs, and Tennis elbow. Past Surgical History:   has a past surgical history that includes  section; Wrist ganglion excision (Right); Cardioversion (2016); Cardioversion (2019); Colonoscopy; and ablation of dysrhythmic focus (2020). Home Medications:    Prior to Admission medications    Medication Sig Start Date End Date Taking?  Authorizing Provider   gabapentin (NEURONTIN) 800 MG tablet TAKE ONE TABLET BY MOUTH EVERY NIGHT AT BEDTIME 21  Oscar Ramirez MD   clonazePAM [facility].) 0.5 MG tablet Take two tablets (1 mg) by mouth nightly for restless legs 21  Oscar Ramirez MD   clonazePAM [facility] 0.5 MG tablet Take two tablets (1 mg) by mouth nightly for restless legs 21  Oscar Ramirez MD   rOPINIRole (REQUIP) 1 MG tablet TAKE 1 TABLET BY MOUTH UPON RISING, 3 TABLET BY MOUTH  AT 6 PM AND 3 TABLETS BY  MOUTH AT BEDTIME 3/15/21 8/22/21  Sonu Mckeon MD   fluticasone [facility]) 50 MCG/ACT nasal spray 1 spray by Each Nostril route daily    Historical Provider, MD   spironolactone (ALDACTONE) 25 MG tablet Take 25 mg by mouth daily    Historical Provider, MD   Warfarin Sodium (COUMADIN PO) Take 2.5 mg by mouth Daily with supper    Historical Provider, MD   Calcium Carb-Cholecalciferol (CALCIUM 600+D) 600-800 MG-UNIT TABS Take 1 tablet by mouth 2 times daily     Historical Provider, MD   furosemide (LASIX) 40 MG tablet 40 mg 2 times daily 2 qam and 1-2 in the afternoon     Historical Provider, MD   atorvastatin (LIPITOR) 10 MG tablet Take 10 mg by mouth nightly     Historical Provider, MD   Potassium Chloride Natalie CR (KLOR-CON M20 PO) Take 40 mEq by mouth 2 times daily     Historical Provider, MD   Fluticasone-Salmeterol (ADVAIR HFA IN) Inhale 2 puffs into the lungs 2 times daily     Historical Provider, MD   amiodarone (CORDARONE) 200 MG tablet Take 200 mg by mouth 2 times daily     Historical Provider, MD   baclofen (LIORESAL) 20 MG tablet Take 20 mg by mouth daily     Historical Provider, MD   desvenlafaxine succinate (PRISTIQ) 100 MG TB24 Take 100 mg by mouth daily    Historical Provider, MD        No current facility-administered medications for this encounter. Allergies:  Patient has no known allergies. Social History:   reports that she has never smoked. She has never used smokeless tobacco. She reports that she does not drink alcohol and does not use drugs. Family History: family history is not on file. REVIEW OF SYSTEMS:      · Constitutional: there has been no unanticipated weight loss. · Eyes: No visual changes or diplopia. · ENT: No Headaches  · Cardiovascular:  Remaining as above  · Respiratory: No cough  · Gastrointestinal: No abdominal pain. No change in bowel or bladder habits.   · Genitourinary: No dysuria, trouble voiding, or hematuria. · Neurological: No headache. PHYSICAL EXAM:      There were no vitals taken for this visit. No intake or output data in the 24 hours ending 10/05/21 0832        Constitutional and General Appearance:   alert, cooperative, no distress and appears stated age  HEENT:  · PERRL, EOMI  Respiratory:  · Normal excursion and expansion without use of accessory muscles  · Resp Auscultation:  Good respiratory effort. No for increased work of breathing. On auscultation: clear to auscultation bilaterally  Cardiovascular:  · Regular rate and rhythm  · S1/S2  · The apical impulse is not displaced  Abdomen:  · Soft  · Bowel sounds present  · Non-tender to palpation  Extremities:  · No cyanosis or clubbing  · Lower extremity edema: No  Skin:  · Warm and dry  Neurological:  · Alert and oriented. Labs:     CBC: No results for input(s): WBC, HGB, HCT, PLT in the last 72 hours. BMP: No results for input(s): NA, K, CO2, BUN, CREATININE, LABGLOM, GLUCOSE in the last 72 hours. Pro-BNP:  No results for input(s): PROBNP in the last 72 hours. BNP: No results for input(s): BNP in the last 72 hours. PT/INR: No results for input(s): PROTIME, INR in the last 72 hours. APTT:No results for input(s): APTT in the last 72 hours. CARDIAC ENZYMES:No results for input(s): CKTOTAL, CKMB, CKMBINDEX, TROPONINI in the last 72 hours. Invalid input(s):  TROPONINT  No results for input(s): TROPONINT in the last 72 hours. FASTING LIPID PANEL:  Lab Results   Component Value Date    HDL 52 01/25/2016    TRIG 92 01/25/2016     LIVER PROFILE:No results for input(s): AST, ALT, LABALBU in the last 72 hours. Patient's Active Problem List  Active Problems:    * No active hospital problems. *  Resolved Problems:    * No resolved hospital problems. *    DATA:      IMPRESSION:    1. Severe MR with eccentric jet on TTE. Report listed above  2. HFpEF  3. Atrial fibrillation on coumadin  4. HLD    RECOMMENDATIONS:  1.  Proceed with SAUL  2. Further recommendations to follow after procedure    Discussed with patient and Nurse. Pre Procedure Conscious Sedation Data:  ASA Class:                  [] I [x] II [] III [] IV     Mallampati Class:       [] I [x] II [] III [] Whitney Beltran MD  Fellow, [address] [place] [address]      Please note that part of this chart were generated using voice recognition  dictation software. Although every effort was made to ensure the accuracy of this automated transcription, some errors in transcription may have occurred. Attestation signed by      Attending Physician Statement:    I have discussed the care of  Yohannes Conrad , including pertinent history and exam findings, with the Cardiology fellow/resident. I have seen and examined the patient and the key elements of all parts of the encounter have been performed by me. I agree with the assessment, plan and orders as documented by the fellow/resident, after I modified exam findings and plan of treatments, and the final version is my approved version of the assessment.      Additional Comments:

## 2021-10-05 NOTE — PROGRESS NOTES
All discharge instructions reviewed, questions answered, paper signed and given copy. Patient discharged   with spouse and belongings.

## 2021-10-05 NOTE — PROCEDURES
Center      I have reviewed the case / procedure with resident / fellow  I have examined the patient personally  Patient agree with treatment plan as discussed before, final arrangement based on my evaluation and exam.    Risk and benefit of procedure planned were explained in details. Procedure was performed by me personally, with all aspect of the procedure being done using standard protocol. Note was modified based on my own assessment and treatment.     Aristides Sotelo MD  [facility]

## 2021-10-05 NOTE — PROGRESS NOTES
Patient admitted, consent signed and questions answered. Patient ready for procedure. Call light to reach with side rails up 2 of 2. Spouse at bedside with patient. History and physical needs to be completed.

## 2021-11-01 ENCOUNTER — TELEPHONE (OUTPATIENT)
Dept: CARDIOLOGY CLINIC | Age: 61
End: 2021-11-01

## 2021-11-08 ENCOUNTER — HOSPITAL ENCOUNTER (OUTPATIENT)
Dept: CARDIAC CATH/INVASIVE PROCEDURES | Age: 61
Discharge: HOME | End: 2021-11-08
Payer: MEDICARE

## 2021-11-08 VITALS
TEMPERATURE: 98.1 F | HEART RATE: 71 BPM | SYSTOLIC BLOOD PRESSURE: 105 MMHG | OXYGEN SATURATION: 99 % | RESPIRATION RATE: 22 BRPM | WEIGHT: 150 LBS | DIASTOLIC BLOOD PRESSURE: 55 MMHG | BODY MASS INDEX: 25.61 KG/M2 | HEIGHT: 64 IN

## 2021-11-08 DIAGNOSIS — I34.0 SEVERE MITRAL REGURGITATION: Primary | ICD-10-CM

## 2021-11-08 LAB
GFR NON-AFRICAN AMERICAN: 48 ML/MIN
GFR SERPL CREATININE-BSD FRML MDRD: 58 ML/MIN
GFR SERPL CREATININE-BSD FRML MDRD: ABNORMAL ML/MIN/{1.73_M2}
GLUCOSE BLD-MCNC: 84 MG/DL (ref 74–100)
PLATELET # BLD: 276 K/UL (ref 138–453)
POC BUN: 28 MG/DL (ref 8–26)
POC CHLORIDE: 100 MMOL/L (ref 98–107)
POC CREATININE: 1.15 MG/DL (ref 0.51–1.19)
POC HEMATOCRIT: 49 % (ref 36–46)
POC HEMOGLOBIN: 16.7 G/DL (ref 12–16)
POC INR: 1.2
POC POTASSIUM: 3.4 MMOL/L (ref 3.5–4.5)
POC SODIUM: 143 MMOL/L (ref 138–146)
PROTHROMBIN TIME, POC: 14.5 SEC (ref 10.4–14.2)

## 2021-11-08 PROCEDURE — 93226 XTRNL ECG REC<48 HR SCAN A/R: CPT

## 2021-11-08 PROCEDURE — 85610 PROTHROMBIN TIME: CPT

## 2021-11-08 PROCEDURE — 93005 ELECTROCARDIOGRAM TRACING: CPT | Performed by: INTERNAL MEDICINE

## 2021-11-08 PROCEDURE — 84520 ASSAY OF UREA NITROGEN: CPT

## 2021-11-08 PROCEDURE — 6370000000 HC RX 637 (ALT 250 FOR IP)

## 2021-11-08 PROCEDURE — 84132 ASSAY OF SERUM POTASSIUM: CPT

## 2021-11-08 PROCEDURE — 2709999900 HC NON-CHARGEABLE SUPPLY

## 2021-11-08 PROCEDURE — 82947 ASSAY GLUCOSE BLOOD QUANT: CPT

## 2021-11-08 PROCEDURE — 7100000000 HC PACU RECOVERY - FIRST 15 MIN

## 2021-11-08 PROCEDURE — 84295 ASSAY OF SERUM SODIUM: CPT

## 2021-11-08 PROCEDURE — 76937 US GUIDE VASCULAR ACCESS: CPT

## 2021-11-08 PROCEDURE — 85014 HEMATOCRIT: CPT

## 2021-11-08 PROCEDURE — 82435 ASSAY OF BLOOD CHLORIDE: CPT

## 2021-11-08 PROCEDURE — C1751 CATH, INF, PER/CENT/MIDLINE: HCPCS

## 2021-11-08 PROCEDURE — C1894 INTRO/SHEATH, NON-LASER: HCPCS

## 2021-11-08 PROCEDURE — 93225 XTRNL ECG REC<48 HRS REC: CPT

## 2021-11-08 PROCEDURE — 82565 ASSAY OF CREATININE: CPT

## 2021-11-08 PROCEDURE — 7100000001 HC PACU RECOVERY - ADDTL 15 MIN

## 2021-11-08 PROCEDURE — 85049 AUTOMATED PLATELET COUNT: CPT

## 2021-11-08 PROCEDURE — 93460 R&L HRT ART/VENTRICLE ANGIO: CPT | Performed by: INTERNAL MEDICINE

## 2021-11-08 PROCEDURE — 6360000002 HC RX W HCPCS

## 2021-11-08 RX ORDER — ATROPINE SULFATE 0.1 MG/ML
0.5 INJECTION INTRAVENOUS ONCE
Status: COMPLETED | OUTPATIENT
Start: 2021-11-08 | End: 2021-11-08

## 2021-11-08 RX ORDER — ALENDRONATE SODIUM 70 MG/1
70 TABLET ORAL
COMMUNITY

## 2021-11-08 RX ORDER — POTASSIUM CHLORIDE 20 MEQ/1
40 TABLET, EXTENDED RELEASE ORAL ONCE
Status: COMPLETED | OUTPATIENT
Start: 2021-11-08 | End: 2021-11-08

## 2021-11-08 RX ORDER — SODIUM CHLORIDE 0.9 % (FLUSH) 0.9 %
5-40 SYRINGE (ML) INJECTION EVERY 12 HOURS SCHEDULED
Status: DISCONTINUED | OUTPATIENT
Start: 2021-11-08 | End: 2021-11-09 | Stop reason: HOSPADM

## 2021-11-08 RX ORDER — SODIUM CHLORIDE 9 MG/ML
25 INJECTION, SOLUTION INTRAVENOUS PRN
Status: DISCONTINUED | OUTPATIENT
Start: 2021-11-08 | End: 2021-11-09 | Stop reason: HOSPADM

## 2021-11-08 RX ORDER — ONDANSETRON 2 MG/ML
4 INJECTION INTRAMUSCULAR; INTRAVENOUS EVERY 6 HOURS PRN
Status: DISCONTINUED | OUTPATIENT
Start: 2021-11-08 | End: 2021-11-09 | Stop reason: HOSPADM

## 2021-11-08 RX ORDER — SODIUM CHLORIDE 0.9 % (FLUSH) 0.9 %
5-40 SYRINGE (ML) INJECTION PRN
Status: DISCONTINUED | OUTPATIENT
Start: 2021-11-08 | End: 2021-11-09 | Stop reason: HOSPADM

## 2021-11-08 RX ORDER — ACETAMINOPHEN 325 MG/1
650 TABLET ORAL EVERY 4 HOURS PRN
Status: DISCONTINUED | OUTPATIENT
Start: 2021-11-08 | End: 2021-11-09 | Stop reason: HOSPADM

## 2021-11-08 RX ORDER — SODIUM CHLORIDE 9 MG/ML
INJECTION, SOLUTION INTRAVENOUS CONTINUOUS
Status: DISCONTINUED | OUTPATIENT
Start: 2021-11-08 | End: 2021-11-09 | Stop reason: HOSPADM

## 2021-11-08 RX ADMIN — POTASSIUM CHLORIDE 40 MEQ: 20 TABLET, EXTENDED RELEASE ORAL at 10:15

## 2021-11-08 RX ADMIN — ATROPINE SULFATE 0.5 MG: 0.1 INJECTION INTRAVENOUS at 13:29

## 2021-11-08 RX ADMIN — SODIUM CHLORIDE: 9 INJECTION, SOLUTION INTRAVENOUS at 09:57

## 2021-11-08 NOTE — PROGRESS NOTES
Patient noted to have bradycardia with junctional escape rhythm. Discussed EKG and rhythm strips with Dr. Shashank Alonso and Dr. Jolanta Del Real. Concern for 2nd degree AV block along with sinus node disease.     - Holter monitor for 48 hrs. - Follow up in office with Dr. Shashank Alonso for further evaluation and possible consideration of PPM. Advised to hold amiodarone till office visit. Patient is not on BB currently.   - continued work up for mitral clip    Gerald Aguirre MD  Cardiology

## 2021-11-08 NOTE — PROGRESS NOTES
Writer introduced self and [facility]. Stated the next step in process to repair her mitral valve was an appointment with the cardiothoracic surgeon and they would be contacting her for an appointment. Writer contact information left with patient.

## 2021-11-08 NOTE — PROGRESS NOTES
Received post procedure to [facility] to room 3. Assessment obtained. Restrictions reviewed with patient. Post procedure pathway initiated. Right groin site soft , band aid dry and intact. No hematoma noted. Family at side. Patient without complaints. Head of bed flat with right leg straight. Taking soda well.

## 2021-11-08 NOTE — H&P
(1 mg) by mouth nightly for restless legs 8/30/21 11/30/21  Claudine Calderón MD   rOPINIRole (REQUIP) 1 MG tablet TAKE 1 TABLET BY MOUTH UPON RISING, 3 TABLET BY MOUTH  AT 6 PM AND 3 TABLETS BY  MOUTH AT BEDTIME 3/15/21 10/5/21  Claudine Calderón MD   fluticasone [facility]) 50 MCG/ACT nasal spray 1 spray by Each Nostril route daily    Historical Provider, MD   spironolactone (ALDACTONE) 25 MG tablet Take 25 mg by mouth daily    Historical Provider, MD   Warfarin Sodium (COUMADIN PO) Take 2.5 mg by mouth Daily with supper 2.5mg 3x week  1.25mg 4x week    Historical Provider, MD   Calcium Carb-Cholecalciferol (CALCIUM 600+D) 600-800 MG-UNIT TABS Take 1 tablet by mouth 2 times daily     Historical Provider, MD   furosemide (LASIX) 40 MG tablet 40 mg 2 times daily 2 qam and 1-2 in the afternoon     Historical Provider, MD   atorvastatin (LIPITOR) 10 MG tablet Take 10 mg by mouth nightly     Historical Provider, MD   Potassium Chloride Natalie CR (KLOR-CON M20 PO) Take 40 mEq by mouth 2 times daily     Historical Provider, MD   Fluticasone-Salmeterol (ADVAIR HFA IN) Inhale 2 puffs into the lungs 2 times daily     Historical Provider, MD   amiodarone (CORDARONE) 200 MG tablet Take 200 mg by mouth 2 times daily     Historical Provider, MD   baclofen (LIORESAL) 20 MG tablet Take 20 mg by mouth daily     Historical Provider, MD   desvenlafaxine succinate (PRISTIQ) 100 MG TB24 Take 100 mg by mouth daily    Historical Provider, MD       No Known Allergies      REVIEW OF SYSTEMS:     A detailed review of system was performed as already noted and is otherwise as above. PHYSICAL EXAM:     There were no vitals filed for this visit. Constitutional and General Appearance: Alert. Not in acute stress. Respiratory:  · Clear to auscultation b/l. No wheeze or crackles. Cardiovascular:  · Regular rate and rhythm. No murmurs.    · Jugular venous pulsation is normal  Abdomen:  · No masses or tenderness  Extremities:  · Lower extremity edema - No  · Skin: Warm and dry  Neurological:  · Alert and oriented. · Moves all extremities well      Active Problems:    * No active hospital problems. *  Resolved Problems:    * No resolved hospital problems. *    Per clinic note:  1. CVA 1/28/2016  2. A-Flutter / A-fib with failed cardioversion (1/2016)  3. SAUL EF 50-55%, mod-severe MR, moderate TR. Jan 2016  4. Echocardiogram Oct 2, 2018 EF 55%, moderate mitral regurgitation. PAP 47 mmHg. 5. Failed atrial fib ablation by Dr. Rachana Kennedy in 2020  6. Holter 5/2020 suggest sinus rhythm with incident of flutter / fib. 7. Echocardiogram 9/15/21: preserved EF, severe mitral regurgitation and tricuspid regurgitation. 8. SAUL 10/5/2021 LVEF 55% MV structurally normal. Severe eccentric regurg identified. MR radius 0.9 cm. MR volume 63 ml. ERO 0.34 cm2. VC 0.9 CM No ICS. Assessment:  1. Severe MR - pre-op clip  2. PAF on coumadin - INR today   3. HTN  4. CKD - patient reports she saw her nephrologist in [place] last week and he told her that it is okay for her to proceed with the cardiac catheterization. Plan:  1. Proceed with planned RHC and LHC for pre-op work up. 2. Further orders to follow. Pre Procedure Conscious Sedation Data:  ASA Class:                  [] I [x] II [] III [] IV     Mallampati Class:       [] I [x] II [] III [] IV      The risks (inlcuding dissection, stroke, bleeding, hematoma, cardiac arrest, renal failure and dialysis), benefits, and alternatives of the prcoedure were discussed in detail with the patient. The patient agrees to proceed with the procedure, verbalizes understanding and signed consent.        Josie Knight MD, MD  Fellow, [address]

## 2021-11-08 NOTE — FLOWSHEET NOTE
Holter Monitor was applied as ordered. Monitor is to be worn  for 48 hrs. Written and verbal instructions were given. Pt. Was given . Unit [address][id number].

## 2021-11-08 NOTE — PROGRESS NOTES
All discharge instructions reviewed, questions answered, paper signed and given copy. Patient discharged per ambulatory per patient request with  and belongings.

## 2021-11-08 NOTE — OP NOTE
[facility]    CARDIAC CATHETERIZATION    Date:   11/8/2021  Patient name:  Cathy Antony  Date of admission:  11/8/2021  8:23 AM  MRN:   [id number]  YOB: 1960    Operators:  Primary:   Brayden Christian MD (Attending Physician)      Procedure performed:     [x] Left Heart Catheterization. [] Graft Angiography. [x] Left Ventriculography. [x] Right Heart Catheterization. [x] Coronary Angiography. [] Aortic Valve Studies. [] PCI:      [] Other:       Pre Procedure Conscious Sedation Data:  ASA Class:    [] I [x] II [] III [] IV    Mallampati Class:  [] I [x] II [] III [] IV      Indication:  [] STEMI      [] + Stress test  [] ACS      [] + EKG Changes  [] Non Q MI       [x] Significant Risk Factors  [x] Recurrent Angina             [] Diabetes Mellitus    [] New LBBB      [] Uncontrolled HTN. [] CHF / Low EF changes     [] Abnormal CTA / Ca Score      Procedure:  Access:  [x] Femoral  [] Radial  artery       [x] Right  [] Left    Procedure: After informed consent was obtained with explanation of the risks and benefits, patient was brought to the cath lab. The access area was prepped and draped in sterile fashion. 1% lidocaine was used for local block. The artery was cannulated with 6  Fr sheath with brisk arterial blood return. The side port was frequently flushed and aspirated with normal saline. Findings:    LMCA: Normal 0% stenosis. LAD: Normal 0% stenosis. LCx: Normal 0% stenosis. RCA: Normal 0% stenosis. Coronary Tree        Dominance: Right     The LV gram was performed in the QUINTANILLA 30 position. LVEF: 55%. Estimated Blood Loss:            [x] Minimal 10-25 cc   [] Minimal < 25 cc      [] Moderate 25-50 cc         [] >50 cc      RIGHT HEART CATHETERIZATION HEMODYNAMIC MEASUREMENTS      Procedure: After informed consent was obtained with explanation of the risks and benefits, the patient was brought to the cath lab.  The right groin was prepped and draped in sterile fashion. 1% lidocaine was used for local block. Under ultrasound guidance, the right femoral vein was cannulated with 7  Fr sheath that was exchanged over a guidewire with non-pulsatile dark venous blood return noted. The side port was frequently flushed and aspirated with normal saline. Measurements were taken at the patient's phlebostatic axis. Hemodynamic Pressures     Site S/A (mmHg) D/V (mmHg) M/ED (mmHg)   Right Atrium 6 5 1   Right Ventricle 41 -1 5   Pulmonary Artery 41 10 23   PCWP 9 10 8         Oxygen Saturations    Site Oxygen Saturation (%)   Right Atrium    Pulmonary Artery Main 69.1   Aorta  83.7        Cardiac Output    Calculation Method Cardiac Output (L/min) Cardiac Index (L/min/m2)   Rafi 4.7 2.7           Conclusions        Procedure Summary        Upper normal right heart pressure    Preserved LV function    Normal coronary arteries        Recommendations        Mitral clip evaluation based on SAUL and severity of MR     Electronically signed on 11/8/2021 at 2:47 PM by:    Oswaldo Webber MD  Fellow, [address]      I have reviewed the case / procedure with resident / fellow  I have examined the patient personally  Patient agree with treatment plan as discussed before, final arrangement based on my evaluation and exam.    Risk and benefit of procedure planned were explained in details. Procedure was performed by me personally, with all aspect of the procedure being done using standard protocol. Note was modified based on my own assessment and treatment.     Lotus Pike MD  [facility]

## 2021-11-09 LAB
EKG ATRIAL RATE: 39 BPM
EKG P AXIS: 66 DEGREES
EKG Q-T INTERVAL: 510 MS
EKG QRS DURATION: 90 MS
EKG QTC CALCULATION (BAZETT): 483 MS
EKG R AXIS: 100 DEGREES
EKG T AXIS: 82 DEGREES
EKG VENTRICULAR RATE: 54 BPM

## 2021-11-18 ENCOUNTER — OFFICE VISIT (OUTPATIENT)
Dept: NEUROLOGY | Age: 61
End: 2021-11-18
Payer: MEDICARE

## 2021-11-18 VITALS
HEIGHT: 64 IN | DIASTOLIC BLOOD PRESSURE: 78 MMHG | BODY MASS INDEX: 25.44 KG/M2 | WEIGHT: 149 LBS | HEART RATE: 64 BPM | SYSTOLIC BLOOD PRESSURE: 125 MMHG

## 2021-11-18 DIAGNOSIS — I48.91 ATRIAL FIBRILLATION, UNSPECIFIED TYPE (HCC): ICD-10-CM

## 2021-11-18 DIAGNOSIS — G25.81 RESTLESS LEGS SYNDROME: Primary | ICD-10-CM

## 2021-11-18 DIAGNOSIS — I63.132 CEREBRAL INFARCTION DUE TO EMBOLISM OF LEFT CAROTID ARTERY (HCC): ICD-10-CM

## 2021-11-18 PROCEDURE — G8484 FLU IMMUNIZE NO ADMIN: HCPCS | Performed by: PSYCHIATRY & NEUROLOGY

## 2021-11-18 PROCEDURE — 1036F TOBACCO NON-USER: CPT | Performed by: PSYCHIATRY & NEUROLOGY

## 2021-11-18 PROCEDURE — G8427 DOCREV CUR MEDS BY ELIG CLIN: HCPCS | Performed by: PSYCHIATRY & NEUROLOGY

## 2021-11-18 PROCEDURE — G8417 CALC BMI ABV UP PARAM F/U: HCPCS | Performed by: PSYCHIATRY & NEUROLOGY

## 2021-11-18 PROCEDURE — 3017F COLORECTAL CA SCREEN DOC REV: CPT | Performed by: PSYCHIATRY & NEUROLOGY

## 2021-11-18 PROCEDURE — 99214 OFFICE O/P EST MOD 30 MIN: CPT | Performed by: PSYCHIATRY & NEUROLOGY

## 2021-11-18 RX ORDER — CLONAZEPAM 1 MG/1
TABLET ORAL
Qty: 90 TABLET | Refills: 0 | Status: SHIPPED | OUTPATIENT
Start: 2021-11-18 | End: 2022-04-04

## 2021-11-18 ASSESSMENT — ENCOUNTER SYMPTOMS
ALLERGIC/IMMUNOLOGIC NEGATIVE: 1
RESPIRATORY NEGATIVE: 1
EYES NEGATIVE: 1
GASTROINTESTINAL NEGATIVE: 1

## 2021-11-18 NOTE — PROCEDURES
[address]                  [id number] [employer], [facility]                                 HOLTER MONITOR    PATIENT NAME: Kavin Love                       :        1960  MED REC NO:   [id number]                             ROOM:  ACCOUNT NO:   [de-identified]                           ADMIT DATE: 2021  PROVIDER:     Anoop Jackson    HOLTER MONITOR 48-HOURS    DATE OF STUDY:  2021    INDICATIONS: Sinus bradycardia    ORDERING PROVIDER: Dr. Aidan Blake PROVIDER: Dr. Chen Henry. Luis Alberto    INTERPRETING PHYSICIAN: Dr. Jacques Cardona:  The patient appeared to remain in atrial fibrillation with rates from 33  to 90 bpm. Occasional Premature ventricular complexes noted. The patient  recorded diary events, the rhythm was atrial fibrillation.     1. atrial fibrillation with rates from 33 to 90 bpm.  2. Occasional Premature ventricular complexes                 YOBANY Coker    D: 2021 14:13:52       T: 2021 14:25:07     [id number]  Job#: [id number]     Doc#: Unknown    CC:

## 2021-11-18 NOTE — PROGRESS NOTES
Subjective:      Patient ID: Sandrita Sotelo is a 59 y.o. female. HPI  Active problem restless legs syndrome readjusting klonopin on neurontin and requip to undergo sleep study . Left hemispheric infarction with prior aphasia along with right hemiparesis resolved with TPA and left ICA terminus thrombus retrieval with atrial fibrillation, January 2016. The condition is polysomnogram shows no sleep apnea with apnea hypopnea index 1.1 episodes per hour , August 2021 . She reports that klonopin has worked attenuating restless legs . She is going to bed at 12 to 1AM falling asleep within 15 minutes sleeping to 7 to 8 AM . There will be 2 awakenings able to fall back asleep . During the day there may be some sleepiness when sedentary which  repots has been since stroke . There is severe mitral valve regurgitation seeing cardiology considering mitral valve clip . There is atrial fibrillation with possible sinus sick syndrome being considered for pacemaker by her report. She is on klonopin 1 mg po qhs, requip 1 mg in AM , 3 mg po 6PM and 3 po qhs and neurontin 800 mg po qhs. She remains on coumadin and lipitor 10 mg po qd. There is normal language with no weakness ,numbness , bulbar or visual complaint  . There are no more transient head pains . Significant medication coumadin ,  lipitor 10 mg po qd, requip 1 mg in AM , 3 mg po 6PM and 3 po qhs in evening, neurontin 800 mg po qhs, klonopin 1 mg po qhs  Testing Head CT showed dense left MCA artery . CTA with left ICA terminus occlusion . CT perfusion with large left hemispheric penumbra with only small ischemic core left basal ganglia . Patient did very well post intervention returning to normal exam with full motor strength with normal language . MRI of Head with tiny punctate infarction left caudate along with left frontal periventricular area .  Cholesterol 118 , , TG 92, AMY normal, homocysteine normal , Hga1c 5.4 , antithrombin 3 80 (), lupus anticoagulant normal, prothrombin mutation normal , Factor V Leiden, anticardiolipin Ab negative . SAUL EF 50-55% . Moderate to severe MR, moderate TR . Carotid US  normal, 2019 . Polysomnogram apnea hypopnea index 1.1 episodes per hour , 2021      Past Medical History:   Diagnosis Date    Arm pain Right and Left    Atrial fibrillation (HCC)     Atrial flutter (HCC)     Cancer (HCC)     skin    Cerebral artery occlusion with cerebral infarction [facility])     Chest pain 2003    COPD (chronic obstructive pulmonary disease) ([facility] [address]     Depression     Former tobacco use     H/O: CVA (cerebrovascular accident)     2016    Heart palpitations     Hyperlipidemia     Radial tunnel syndrome Right radial tunnel release     Restless legs     SOB (shortness of breath) 2021    Tennis elbow Right and left       Past Surgical History:   Procedure Laterality Date    ABLATION OF DYSRHYTHMIC FOCUS  2020    Dr. Swartz Reasons  2021    R-L     CARDIOVERSION  2016    failed    CARDIOVERSION  2019     SECTION      COLONOSCOPY      TRANSESOPHAGEAL ECHOCARDIOGRAM  10/05/2021    severe mitral regurg    WRIST GANGLION EXCISION Right        History reviewed. No pertinent family history.     Social History     Socioeconomic History    Marital status:      Spouse name: None    Number of children: None    Years of education: None    Highest education level: None   Occupational History    None   Tobacco Use    Smoking status: Former Smoker    Smokeless tobacco: Never Used   Vaping Use    Vaping Use: Never used   Substance and Sexual Activity    Alcohol use: No     Alcohol/week: 0.0 standard drinks    Drug use: No    Sexual activity: None   Other Topics Concern    None   Social History Narrative    None     Social Determinants of Health     Financial Resource Strain:     Difficulty of Paying Living Expenses: Not on file   Food Insecurity:     Worried About Running Out of Food in the Last Year: Not on file    Javier of Food in the Last Year: Not on file   Transportation Needs:     Lack of Transportation (Medical): Not on file    Lack of Transportation (Non-Medical): Not on file   Physical Activity:     Days of Exercise per Week: Not on file    Minutes of Exercise per Session: Not on file   Stress:     Feeling of Stress : Not on file   Social Connections:     Frequency of Communication with Friends and Family: Not on file    Frequency of Social Gatherings with Friends and Family: Not on file    Attends Buddhist Services: Not on file    Active Member of [address] [place] [address] [employer] or Organizations: Not on file    Attends Club or Organization Meetings: Not on file    Marital Status: Not on file   Intimate Partner Violence:     Fear of Current or Ex-Partner: Not on file    Emotionally Abused: Not on file    Physically Abused: Not on file    Sexually Abused: Not on file   Housing Stability:     Unable to Pay for Housing in the Last Year: Not on file    Number of [place] in the Last Year: Not on file    Unstable Housing in the Last Year: Not on file       Current Outpatient Medications   Medication Sig Dispense Refill    clonazePAM (KLONOPIN) 1 MG tablet Take 1 po qhs 90 tablet 0    alendronate (FOSAMAX) 70 MG tablet Take 70 mg by mouth every 7 days Takes on Tuesday.  gabapentin (NEURONTIN) 800 MG tablet TAKE ONE TABLET BY MOUTH EVERY NIGHT AT BEDTIME 30 tablet 2    clonazePAM (KLONOPIN) 0.5 MG tablet Take two tablets (1 mg) by mouth nightly for restless legs (Patient taking differently: Take 1 mg by mouth nightly as needed.  Take two tablets (1 mg) by mouth nightly for restless legs) 180 tablet 0    rOPINIRole (REQUIP) 1 MG tablet TAKE 1 TABLET BY MOUTH UPON RISING, 3 TABLET BY MOUTH  AT 6 PM AND 3 TABLETS BY  MOUTH AT BEDTIME 630 tablet 3    fluticasone (FLONASE) 50 MCG/ACT nasal spray 1 spray by Each Nostril route daily      spironolactone (ALDACTONE) 25 MG tablet Take 25 mg by mouth daily      Warfarin Sodium (COUMADIN PO) Take 2.5 mg by mouth Daily with supper 2.5mg 3x week  1.25mg 4x week      Calcium Carb-Cholecalciferol (CALCIUM 600+D) 600-800 MG-UNIT TABS Take 1 tablet by mouth 2 times daily       furosemide (LASIX) 40 MG tablet Take 80 mg by mouth 2 times daily       atorvastatin (LIPITOR) 10 MG tablet Take 10 mg by mouth nightly       Potassium Chloride Natalie CR (KLOR-CON M20 PO) Take 40 mEq by mouth 2 times daily       Fluticasone-Salmeterol (ADVAIR HFA IN) Inhale 2 puffs into the lungs 2 times daily       baclofen (LIORESAL) 20 MG tablet Take 20 mg by mouth daily       desvenlafaxine succinate (PRISTIQ) 100 MG TB24 Take 100 mg by mouth daily      amiodarone (CORDARONE) 200 MG tablet Take 200 mg by mouth 2 times daily  (Patient not taking: Reported on 11/18/2021)       No current facility-administered medications for this visit. No Known Allergies      Review of Systems   Constitutional: Negative. HENT: Negative. Eyes: Negative. Respiratory: Negative. Cardiovascular: Positive for leg swelling. Gastrointestinal: Negative. Endocrine: Negative. Genitourinary: Negative. Musculoskeletal: Negative. Allergic/Immunologic: Negative. Hematological: Negative. Psychiatric/Behavioral: The patient is nervous/anxious. Objective:   Physical Exam  Vitals:    11/18/21 1113   BP: 125/78   Pulse: 64     weight: 149 lb (67.6 kg)    Neurological Examination  Ears /Nose/Throat: external ear . Normal exam  Neck and thyroid . Normal size  Respiratory . Breathsounds clear bilaterally  Cardiovascular:  Auscultation of heart with regular rate and rhythm   Musculoskeletal. Muscle bulk and tone normal   Muscle strength 5/5 strength throughout   No dysmetria or dysdiadokinesis  No tremor   Normal fine motor  Orientation Alert and oriented x 3   Language process and speech normal . No aphasia   Cranial nerve 2 normal acuety and visual fields  Cranial nerve 3, 4 and 6 . Extraocular muscles are intact . Pupils are equal and reactive   Cranial nerve 5 . Intact corneal reflex. Normal facial sensation  Cranial nerve 7 normal exam   Cranial nerve 8. Grossly intact hearing   Cranial nerve 9 and 10. Symmetric palate elevation   Cranial nerve 11 , 5 out of 5 strength   Cranial Nerve 12 midline tongue . No atrophy  Sensation . Normal pinprick and light touch   Deep Tendon Reflexes normal  Plantar response flexor bilaterally    Assessment:      1. Restless legs syndrome    2.  Atrial fibrillation, unspecified type ([address]    3. Cerebral infarction due to embolism of left carotid artery ([address]    She is to continue current medication regimen     Plan:      As above

## 2021-12-01 ENCOUNTER — HOSPITAL ENCOUNTER (OUTPATIENT)
Dept: PREADMISSION TESTING | Age: 61
Discharge: HOME | End: 2021-12-05
Payer: MEDICARE

## 2021-12-01 ENCOUNTER — HOSPITAL ENCOUNTER (OUTPATIENT)
Dept: GENERAL RADIOLOGY | Age: 61
Discharge: HOME | End: 2021-12-03
Payer: MEDICARE

## 2021-12-01 ENCOUNTER — INITIAL CONSULT (OUTPATIENT)
Dept: CARDIOTHORACIC SURGERY | Age: 61
End: 2021-12-01
Payer: MEDICARE

## 2021-12-01 VITALS
BODY MASS INDEX: 25.61 KG/M2 | HEART RATE: 64 BPM | RESPIRATION RATE: 18 BRPM | SYSTOLIC BLOOD PRESSURE: 107 MMHG | TEMPERATURE: 97.3 F | DIASTOLIC BLOOD PRESSURE: 64 MMHG | WEIGHT: 150 LBS | OXYGEN SATURATION: 100 % | HEIGHT: 64 IN

## 2021-12-01 VITALS
HEART RATE: 58 BPM | BODY MASS INDEX: 25.61 KG/M2 | HEIGHT: 64 IN | RESPIRATION RATE: 14 BRPM | TEMPERATURE: 98.1 F | SYSTOLIC BLOOD PRESSURE: 130 MMHG | OXYGEN SATURATION: 100 % | WEIGHT: 150 LBS | DIASTOLIC BLOOD PRESSURE: 73 MMHG

## 2021-12-01 DIAGNOSIS — I48.0 AF (PAROXYSMAL ATRIAL FIBRILLATION) (HCC): ICD-10-CM

## 2021-12-01 DIAGNOSIS — I34.0 MITRAL VALVE INSUFFICIENCY, UNSPECIFIED ETIOLOGY: Primary | ICD-10-CM

## 2021-12-01 DIAGNOSIS — I48.91 ATRIAL FIBRILLATION STATUS POST CARDIOVERSION (HCC): ICD-10-CM

## 2021-12-01 LAB
ABSOLUTE EOS #: 0.05 K/UL (ref 0–0.44)
ABSOLUTE IMMATURE GRANULOCYTE: <0.03 K/UL (ref 0–0.3)
ABSOLUTE LYMPH #: 1.44 K/UL (ref 1.1–3.7)
ABSOLUTE MONO #: 0.67 K/UL (ref 0.1–1.2)
ALBUMIN SERPL-MCNC: 4.4 G/DL (ref 3.5–5.2)
ALBUMIN/GLOBULIN RATIO: 1.3 (ref 1–2.5)
ALLEN TEST: POSITIVE
ALP BLD-CCNC: 95 U/L (ref 35–104)
ALT SERPL-CCNC: 33 U/L (ref 5–33)
ANION GAP SERPL CALCULATED.3IONS-SCNC: 12 MMOL/L (ref 9–17)
AST SERPL-CCNC: 31 U/L
BASOPHILS # BLD: 0 % (ref 0–2)
BASOPHILS ABSOLUTE: 0.03 K/UL (ref 0–0.2)
BILIRUB SERPL-MCNC: 0.37 MG/DL (ref 0.3–1.2)
BILIRUBIN URINE: NEGATIVE
BUN BLDV-MCNC: 26 MG/DL (ref 8–23)
BUN/CREAT BLD: ABNORMAL (ref 9–20)
CALCIUM SERPL-MCNC: 9.6 MG/DL (ref 8.6–10.4)
CHLORIDE BLD-SCNC: 101 MMOL/L (ref 98–107)
CO2: 29 MMOL/L (ref 20–31)
COLLAGEN ADENOSINE-5'-DIPHOSPHATE (ADP) TIME: 87 SEC (ref 67–112)
COLLAGEN EPINEPHRINE TIME: 101 SEC (ref 85–172)
COLOR: YELLOW
COMMENT UA: NORMAL
CREAT SERPL-MCNC: 1.03 MG/DL (ref 0.5–0.9)
DIFFERENTIAL TYPE: ABNORMAL
EOSINOPHILS RELATIVE PERCENT: 1 % (ref 1–4)
ESTIMATED AVERAGE GLUCOSE: 126 MG/DL
FIO2: 21
GFR AFRICAN AMERICAN: >60 ML/MIN
GFR NON-AFRICAN AMERICAN: 54 ML/MIN
GFR SERPL CREATININE-BSD FRML MDRD: ABNORMAL ML/MIN/{1.73_M2}
GFR SERPL CREATININE-BSD FRML MDRD: ABNORMAL ML/MIN/{1.73_M2}
GLUCOSE BLD-MCNC: 88 MG/DL (ref 70–99)
GLUCOSE URINE: NEGATIVE
HBA1C MFR BLD: 6 % (ref 4–6)
HCT VFR BLD CALC: 48.3 % (ref 36.3–47.1)
HEMOGLOBIN: 15.2 G/DL (ref 11.9–15.1)
IMMATURE GRANULOCYTES: 0 %
INR BLD: 2
KETONES, URINE: NEGATIVE
LEUKOCYTE ESTERASE, URINE: NEGATIVE
LYMPHOCYTES # BLD: 21 % (ref 24–43)
MCH RBC QN AUTO: 29.3 PG (ref 25.2–33.5)
MCHC RBC AUTO-ENTMCNC: 31.5 G/DL (ref 28.4–34.8)
MCV RBC AUTO: 93.1 FL (ref 82.6–102.9)
MODE: ABNORMAL
MONOCYTES # BLD: 10 % (ref 3–12)
NEGATIVE BASE EXCESS, ART: ABNORMAL (ref 0–2)
NITRITE, URINE: NEGATIVE
NRBC AUTOMATED: 0 PER 100 WBC
O2 DEVICE/FLOW/%: ABNORMAL
PARTIAL THROMBOPLASTIN TIME: 34.1 SEC (ref 20.5–30.5)
PATIENT TEMP: ABNORMAL
PDW BLD-RTO: 14.8 % (ref 11.8–14.4)
PH UA: 6.5 (ref 5–8)
PLATELET # BLD: 309 K/UL (ref 138–453)
PLATELET ESTIMATE: ABNORMAL
PLATELET FUNCTION INTERP: NORMAL
PMV BLD AUTO: 9.7 FL (ref 8.1–13.5)
POC HCO3: 29.1 MMOL/L (ref 21–28)
POC O2 SATURATION: 96 % (ref 94–98)
POC PCO2 TEMP: ABNORMAL MM HG
POC PCO2: 43.3 MM HG (ref 35–48)
POC PH TEMP: ABNORMAL
POC PH: 7.43 (ref 7.35–7.45)
POC PO2 TEMP: ABNORMAL MM HG
POC PO2: 83.3 MM HG (ref 83–108)
POSITIVE BASE EXCESS, ART: 4 (ref 0–3)
POTASSIUM SERPL-SCNC: 4 MMOL/L (ref 3.7–5.3)
PROTEIN UA: NEGATIVE
PROTHROMBIN TIME: 20.6 SEC (ref 9.1–12.3)
RBC # BLD: 5.19 M/UL (ref 3.95–5.11)
RBC # BLD: ABNORMAL 10*6/UL
SAMPLE SITE: ABNORMAL
SEG NEUTROPHILS: 68 % (ref 36–65)
SEGMENTED NEUTROPHILS ABSOLUTE COUNT: 4.81 K/UL (ref 1.5–8.1)
SODIUM BLD-SCNC: 142 MMOL/L (ref 135–144)
SPECIFIC GRAVITY UA: 1.01 (ref 1–1.03)
TCO2 (CALC), ART: ABNORMAL MMOL/L (ref 22–29)
TOTAL PROTEIN: 7.7 G/DL (ref 6.4–8.3)
TURBIDITY: CLEAR
URINE HGB: NEGATIVE
UROBILINOGEN, URINE: NORMAL
WBC # BLD: 7 K/UL (ref 3.5–11.3)
WBC # BLD: ABNORMAL 10*3/UL

## 2021-12-01 PROCEDURE — 36600 WITHDRAWAL OF ARTERIAL BLOOD: CPT

## 2021-12-01 PROCEDURE — 71046 X-RAY EXAM CHEST 2 VIEWS: CPT

## 2021-12-01 PROCEDURE — 86900 BLOOD TYPING SEROLOGIC ABO: CPT

## 2021-12-01 PROCEDURE — 87641 MR-STAPH DNA AMP PROBE: CPT

## 2021-12-01 PROCEDURE — G8417 CALC BMI ABV UP PARAM F/U: HCPCS | Performed by: NURSE PRACTITIONER

## 2021-12-01 PROCEDURE — 85610 PROTHROMBIN TIME: CPT

## 2021-12-01 PROCEDURE — 99204 OFFICE O/P NEW MOD 45 MIN: CPT | Performed by: NURSE PRACTITIONER

## 2021-12-01 PROCEDURE — 93005 ELECTROCARDIOGRAM TRACING: CPT | Performed by: THORACIC SURGERY (CARDIOTHORACIC VASCULAR SURGERY)

## 2021-12-01 PROCEDURE — 81003 URINALYSIS AUTO W/O SCOPE: CPT

## 2021-12-01 PROCEDURE — 83036 HEMOGLOBIN GLYCOSYLATED A1C: CPT

## 2021-12-01 PROCEDURE — G8427 DOCREV CUR MEDS BY ELIG CLIN: HCPCS | Performed by: NURSE PRACTITIONER

## 2021-12-01 PROCEDURE — 85730 THROMBOPLASTIN TIME PARTIAL: CPT

## 2021-12-01 PROCEDURE — 82803 BLOOD GASES ANY COMBINATION: CPT

## 2021-12-01 PROCEDURE — 86850 RBC ANTIBODY SCREEN: CPT

## 2021-12-01 PROCEDURE — G8484 FLU IMMUNIZE NO ADMIN: HCPCS | Performed by: NURSE PRACTITIONER

## 2021-12-01 PROCEDURE — 3017F COLORECTAL CA SCREEN DOC REV: CPT | Performed by: NURSE PRACTITIONER

## 2021-12-01 PROCEDURE — 85025 COMPLETE CBC W/AUTO DIFF WBC: CPT

## 2021-12-01 PROCEDURE — 1036F TOBACCO NON-USER: CPT | Performed by: NURSE PRACTITIONER

## 2021-12-01 PROCEDURE — 87086 URINE CULTURE/COLONY COUNT: CPT

## 2021-12-01 PROCEDURE — 85576 BLOOD PLATELET AGGREGATION: CPT

## 2021-12-01 PROCEDURE — 80053 COMPREHEN METABOLIC PANEL: CPT

## 2021-12-01 PROCEDURE — 86920 COMPATIBILITY TEST SPIN: CPT

## 2021-12-01 PROCEDURE — 86901 BLOOD TYPING SEROLOGIC RH(D): CPT

## 2021-12-01 PROCEDURE — 36415 COLL VENOUS BLD VENIPUNCTURE: CPT

## 2021-12-01 RX ORDER — SODIUM CHLORIDE, SODIUM LACTATE, POTASSIUM CHLORIDE, CALCIUM CHLORIDE 600; 310; 30; 20 MG/100ML; MG/100ML; MG/100ML; MG/100ML
1000 INJECTION, SOLUTION INTRAVENOUS CONTINUOUS
Status: CANCELLED | OUTPATIENT
Start: 2021-12-01

## 2021-12-01 ASSESSMENT — ENCOUNTER SYMPTOMS
CONSTIPATION: 0
SHORTNESS OF BREATH: 1
DIARRHEA: 0
ABDOMINAL PAIN: 0
COLOR CHANGE: 0

## 2021-12-01 NOTE — PROGRESS NOTES
Anesthesia Focused Assessment    Has patient ever tested positive for COVID? No    STOP-BANG Sleep Apnea Questionnaire    SNORE loudly (heard through closed doors)? No  TIRED, fatigued, sleepy during daytime? No  OBSERVED stopping breathing during sleep? No  High blood PRESSURE being treated? Yes    BMI over 35? No  AGE over 48? Yes  NECK circumference over 16\"? No  GENDER (male)? No             Total 2  High risk 5-8  Intermediate risk 3-4  Low risk 0-2    Obstructive Sleep Apnea: patient denies, says she tested negative on sleep study. If YES, machine used: no cpap     Type 1 DM:   no  T2DM:  no    Coronary Artery Disease:  Scheduled for MVR. Hypertension:  yes    Active smoker: Former smoker  Drinks Alcohol:  no    Dentition: molar crowns    Defib / AICD / Pacemaker: no      Renal Failure/dialysis:  no    Patient was evaluated in PAT & anesthesia guidelines were applied. NPO guidelines, medication instructions and scheduled arrival time were reviewed with patient.     Hx of anesthesia complications:  no  Family hx of anesthesia complications:  no                                                                                                                     Anesthesia contacted:   no  Medical or cardiac clearance ordered: no    Cathy Metz PA-C  12/1/21  12:36 PM

## 2021-12-01 NOTE — PATIENT INSTRUCTIONS
Patient Education        Mitral Valve Regurgitation: Care Instructions  Overview     The mitral valve lets blood flow from the upper to the lower heart chamber on the left side of the heart. Mitral valve regurgitation occurs when the valve can't close all the way and blood backs up into the upper chamber of the heart. This causes the heart to work harder to pump the extra blood. You may have this condition for many years without having problems. But over time, it can weaken the heart and lead to heart failure. This condition can be caused by many things, including mitral valve prolapse, calcium buildup on the valve, coronary artery disease, and heart failure. Your doctor will check your heart regularly. Your doctor will likely recommend a heart-healthy lifestyle. You may take medicine to treat a problem that is causing, or was caused by, the regurgitation. If the disease becomes severe, you may choose to have the valve repaired or replaced. Follow-up care is a key part of your treatment and safety. Be sure to make and go to all appointments, and call your doctor if you are having problems. It's also a good idea to know your test results and keep a list of the medicines you take. How can you care for yourself at home? · Be safe with medicines. Take your medicines exactly as prescribed. Call your doctor if you think you are having a problem with your medicine. You will get more details on the specific medicines your doctor prescribes. · Call your doctor if you have new symptoms or your symptoms get worse. · Eat heart-healthy foods. These include vegetables, fruits, nuts, beans, lean meat, fish, and whole grains. Limit sodium, sugar, and alcohol. · Be active. Ask your doctor what type and level of exercise is safe for you. Let your doctor know if your ability to exercise changes. · Do not smoke. If you need help quitting, talk to your doctor about stop-smoking programs and medicines.  These can increase your chances of quitting for good. · Stay at a healthy weight. Lose weight if you need to. · Manage other health problems. If you think you may have a problem with alcohol or drug use, talk to your doctor. · Avoid colds and flu. Get a pneumococcal vaccine shot. If you have had one before, ask your doctor if you need another dose. Get a flu vaccine every year. · Take care of your teeth and gums. Get regular dental checkups. Good dental health is important because bacteria can spread from infected teeth and gums to the heart valves. When should you call for help? Call 911 anytime you think you may need emergency care. For example, call if:    · You have severe trouble breathing.     · You cough up pink, foamy mucus.     · You have symptoms of a heart attack. These may include:  ? Chest pain or pressure, or a strange feeling in the chest.  ? Sweating. ? Shortness of breath. ? Nausea or vomiting. ? Pain, pressure, or a strange feeling in the back, neck, jaw, or upper belly or in one or both shoulders or arms. ? Lightheadedness or sudden weakness. ? A fast or irregular heartbeat. After you call 911, the  may tell you to chew 1 adult-strength or 2 to 4 low-dose aspirin. Wait for an ambulance. Do not try to drive yourself. Call your doctor now or seek immediate medical care if:    · You have new symptoms or your symptoms get worse.     · You have new or increased shortness of breath.     · You are dizzy or lightheaded, or you feel like you may faint.     · You have sudden weight gain, such as more than 2 to 3 pounds in a day or 5 pounds in a week. (Your doctor may suggest a different range of weight gain.)     · You have new or increased swelling in your legs, ankles, or feet.     · You are suddenly so tired or weak that you cannot do your usual activities. Watch closely for changes in your health, and be sure to contact your doctor if you develop new symptoms. Where can you learn more?   Go to [email or web address] and sign in to your [employer] account. Enter G003 in the [facility] box to learn more about \"Mitral Valve Regurgitation: Care Instructions. \"     If you do not have an account, please click on the \"Sign Up Now\" link. Current as of: April 29, 2021               Content Version: 13.0  © 4352-3465 [employer], [facility]. Care instructions adapted under license by [facility] ([facility]). If you have questions about a medical condition or this instruction, always ask your healthcare professional. [place] [address] any warranty or liability for your use of this information.

## 2021-12-01 NOTE — PROGRESS NOTES
[facility] Cardiothoracic Surgery  CONSULTATION      CC: Severe mitral regurgitation     HPI:  Ms. Christiano Smith is a 59 y.o.  female who presents with known mitral valve regurgitation. Pertinent medical history includes atrial fibrillation with chronic anticoagulation, hx of CVA r/t carotid artery stenosis with right hemiparesis, aphasia and sleep apnea. Cardiac workup has revealed moderate to severe mitral regurgitation. Patient admits to increasing fatigue and mild dyspnea on exertion, denies chest pain and shortness of breath at rest.  She has been referred for consideration of mitral valve repair/replace vs mitral clip. PMH:   has a past medical history of Arm pain (Right and Left), Atrial fibrillation ([facility]), Atrial fibrillation status post cardioversion [facility]), Atrial flutter ([facility] Cancer ([facility] [address] Carotid artery stenosis, Cerebral artery occlusion with cerebral infarction [facility]), Chest pain (2003), CHF (congestive heart failure) ([facility] COPD (chronic obstructive pulmonary disease) ([facility] [address] Depression, Former tobacco use, H/O: CVA (cerebrovascular accident), Heart palpitations, History of lateral epicondylitis of right elbow, Hyperlipidemia, Hyperparathyroidism ([facility] [address] Hypertensive chronic kidney disease with stage 1 through stage 4 chronic kidney disease, or unspecified chronic kidney disease, Long term current use of anticoagulant, Mitral valve insufficiency, LAURA (obstructive sleep apnea), Personal history of other medical treatment, Radial tunnel syndrome (Right radial tunnel release ), Restless legs, SOB (shortness of breath) (2021), and Tennis elbow (Right and left). PSH:   has a past surgical history that includes  section; Wrist ganglion excision (Right); Cardioversion (2016); Cardioversion (2019); Colonoscopy; ablation of dysrhythmic focus (2020); transesophageal echocardiogram (10/05/2021); and Cardiac catheterization (2021).     Allergies:  No Known Allergies    Medications:   Current Outpatient Medications:     enoxaparin (LOVENOX) 120 MG/0.8ML injection, Inject 0.45 mLs into the skin every 12 hours for 7 days, Disp: 6.3 mL, Rfl: 0    clonazePAM (KLONOPIN) 1 MG tablet, Take 1 po qhs, Disp: 90 tablet, Rfl: 0    alendronate (FOSAMAX) 70 MG tablet, Take 70 mg by mouth every 7 days Takes on Tuesday. , Disp: , Rfl:     gabapentin (NEURONTIN) 800 MG tablet, TAKE ONE TABLET BY MOUTH EVERY NIGHT AT BEDTIME, Disp: 30 tablet, Rfl: 2    fluticasone (FLONASE) 50 MCG/ACT nasal spray, 1 spray by Each Nostril route daily, Disp: , Rfl:     spironolactone (ALDACTONE) 25 MG tablet, Take 25 mg by mouth daily, Disp: , Rfl:     Warfarin Sodium (COUMADIN PO), Take 2.5 mg by mouth Daily with supper 2.5mg 3x week 1.25mg 4x week, Disp: , Rfl:     Calcium Carb-Cholecalciferol (CALCIUM 600+D) 600-800 MG-UNIT TABS, Take 1 tablet by mouth 2 times daily , Disp: , Rfl:     furosemide (LASIX) 40 MG tablet, Take 80 mg by mouth 2 times daily , Disp: , Rfl:     atorvastatin (LIPITOR) 10 MG tablet, Take 10 mg by mouth nightly , Disp: , Rfl:     Potassium Chloride Natalie CR (KLOR-CON M20 PO), Take 40 mEq by mouth 2 times daily , Disp: , Rfl:     Fluticasone-Salmeterol (ADVAIR HFA IN), Inhale 2 puffs into the lungs 2 times daily , Disp: , Rfl:     baclofen (LIORESAL) 20 MG tablet, Take 20 mg by mouth daily , Disp: , Rfl:     desvenlafaxine succinate (PRISTIQ) 100 MG TB24, Take 100 mg by mouth daily, Disp: , Rfl:     clonazePAM (KLONOPIN) 0.5 MG tablet, Take two tablets (1 mg) by mouth nightly for restless legs (Patient taking differently: Take 1 mg by mouth nightly as needed.  Take two tablets (1 mg) by mouth nightly for restless legs), Disp: 180 tablet, Rfl: 0    rOPINIRole (REQUIP) 1 MG tablet, TAKE 1 TABLET BY MOUTH UPON RISING, 3 TABLET BY MOUTH  AT 6 PM AND 3 TABLETS BY  MOUTH AT BEDTIME, Disp: 630 tablet, Rfl: 3    amiodarone (CORDARONE) 200 MG tablet, Take 200 mg by mouth 2 times daily  (Patient not taking: Reported on 12/1/2021), Disp: , Rfl:     Social Hx:   reports that she has quit smoking. She has never used smokeless tobacco.    Family Hx:  family history includes Cancer in her mother and sister; No Known Problems in her father; Peripheral Vascular Disease in her sister. ROS:    Review of Systems   Constitutional: Positive for activity change and fatigue. Negative for chills and fever. HENT: Negative for congestion. Eyes: Negative for visual disturbance. Respiratory: Positive for shortness of breath. Cardiovascular: Positive for palpitations and leg swelling. Negative for chest pain. Gastrointestinal: Negative for abdominal pain, constipation and diarrhea. Genitourinary: Negative for dysuria. Musculoskeletal: Negative for gait problem. Skin: Negative for color change. Neurological: Negative for dizziness and weakness. Psychiatric/Behavioral: Negative for suicidal ideas. The patient is nervous/anxious. Physical Examination    Vitals:  Vitals:    12/01/21 0908   BP: 107/64   Pulse: 64   Resp: 18   Temp: 97.3 °F (36.3 °C)   SpO2: 100%     Physical Exam  Vitals reviewed. Constitutional:       General: She is not in acute distress. Appearance: Normal appearance. She is normal weight. She is not ill-appearing. HENT:      Head: Normocephalic. Nose: Nose normal.      Mouth/Throat:      Mouth: Mucous membranes are moist.      Pharynx: Oropharynx is clear. Eyes:      Extraocular Movements: Extraocular movements intact. Conjunctiva/sclera: Conjunctivae normal.      Pupils: Pupils are equal, round, and reactive to light. Cardiovascular:      Rate and Rhythm: Normal rate and regular rhythm. Pulses: Normal pulses. Heart sounds: Normal heart sounds. Pulmonary:      Effort: Pulmonary effort is normal.      Breath sounds: Normal breath sounds. Abdominal:      Palpations: Abdomen is soft. Tenderness:  There is no abdominal tenderness. Musculoskeletal:         General: Normal range of motion. Cervical back: Normal range of motion. Right lower leg: No edema. Left lower leg: No edema. Skin:     General: Skin is warm and dry. Capillary Refill: Capillary refill takes less than 2 seconds. Neurological:      General: No focal deficit present. Mental Status: She is alert and oriented to person, place, and time. Psychiatric:         Mood and Affect: Mood normal.         Behavior: Behavior normal.         Labs:   CBC: No results for input(s): WBC, HGB, HCT, MCV, PLT in the last 72 hours. BMP:No results for input(s): NA, K, CL, CO2, PHOS, BUN, CREATININE, MG in the last 72 hours. Invalid input(s): CA  Accucheck Glucoses:No results for input(s): POCGLU in the last 72 hours. Cardiac Enzymes: No results for input(s): CKTOTAL, CKMB, CKMBINDEX, TROPONINI in the last 72 hours. PT/INR: No results for input(s): PROTIME, INR in the last 72 hours. APTT: No results for input(s): APTT in the last 72 hours.   Liver Profile:  No results found for: AST, ALT, ALB, BILIDIR, BILITOT, ALKPHOS, GGT, 5NUC  Lab Results   Component Value Date    CHOL 181 01/25/2016    HDL 52 01/25/2016    TRIG 92 01/25/2016     TSH: No results found for: TSH  UA:   Lab Results   Component Value Date    COLORU YELLOW 01/24/2016    PHUR 6.0 01/24/2016    WBCUA 0 TO 2 01/24/2016    RBCUA 0 TO 2 01/24/2016    MUCUS 1+ 01/24/2016    TRICHOMONAS NOT REPORTED 01/24/2016    YEAST NOT REPORTED 01/24/2016    BACTERIA FEW 01/24/2016    SPECGRAV 1.031 01/24/2016    LEUKOCYTESUR NEGATIVE 01/24/2016    UROBILINOGEN Normal 01/24/2016    BILIRUBINUR NEGATIVE 01/24/2016    GLUCOSEU NEGATIVE 01/24/2016    AMORPHOUS NOT REPORTED 01/24/2016         Testing:  Cardiac cath:   Upper normal right heart pressure   Preserved LV function   Normal coronary arteries      Recommendations:    Mitral clip evaluation based on SAUL and severity of MR      Signature: ----------------------------------------------------------------   Electronically signed by Marnie Aburto(Performing Physician) on   11/08/2021 11:45   ----------------------------------------------------------------      Angiographic Findings: Cardiac Arteries and Lesion Findings     LMCA: Normal 0% stenosis.   LAD: Normal 0% stenosis.   LCx: Normal 0% stenosis.   RCA: Normal 0% stenosis.      Coronary Tree Dominance: Right     LV Analysis  LV function assessed as:Normal.  Ejection Fraction  +----------------------------------------------------------------------+---+  ! Method                                                                ! EF%! +----------------------------------------------------------------------+---+  ! LV gram                                                               !55 !  +----------------------------------------------------------------------+---+    Echo:       CT scan: To be ordered per Structural Heart team    STS Calculation:       both mitral valve replacement and mitral valve repair needs to be calculated and included in documentation. [email or web address]. Risk of mortality to surgical risk: Low risk < 3%. Intermediate risk 3 to 7%. High risk >/= 8%. This calculation may or may not accurately reflect the patient's clinic presentation, therefore final risk assessment to be completed by the cardiothoracic surgeon. Imaging Studies:  I have personally reviewed the testing/images with Dr Fozia Montana, and he is in agreement with the findings listed above.     Assessment:     Mitral regurgitation, moderate to severe   Tricuspid regurgitation, Moderate to severe  o RVSP 43 mmHg   Moderate left atrial enlargement   Paroxysmal atrial fibrillation   o History of cardioversion  o S/p Cryoablation with Dr Terence Raya 6/11/2020  o Bradycardia with occasional PVCs per Cardiology charting   Obstructive sleep apnea   CKD stage 3  o Follows with Nephrology   History of CVA   o Follows with Dr Tiffany Parker, Neurology  o Arterial ischemic left carotid artery  o Arterial ischemic middle cerebral artery  o Residual right sided hemiparesis with mobility impairment & global asphasia   Chronic anticoagulation secondary to above on Coumadin   Restless leg syndrome      Recommendation:  Per assessment by Dr Clau Liang, this patient would be high risk for an open procedure to repair or replace the mitral valve. Therefore Ms. Rajeev Pereira would benefit optimally from mitral clip. I have discussed the proposed procedure, along with its risk, benefits, and therapeutic alternatives. Specific risks discussedincluded death, stroke, mediastinitis, re-operation for bleeding, and atrial fibrillation. We have also discussed the potential need for blood transfusion, along with its risks and benefits. She appears to understand,and consents to proceed. Further testing and intervention to be scheduled via the structural heart team.    On this date 12/1/2021 I have spent 50 minutes reviewing previous notes, test results and face to face with the patient discussing the diagnosis and importance of compliance with the treatment plan as well as documenting on the day of the visit. At least 50% of the time documented was spent with the patient to provide counseling and/or coordination of care.     Marcelina Frankel, APRN - CNP

## 2021-12-02 LAB
CULTURE: NORMAL
EKG ATRIAL RATE: 67 BPM
EKG P AXIS: 69 DEGREES
EKG P-R INTERVAL: 218 MS
EKG Q-T INTERVAL: 466 MS
EKG QRS DURATION: 104 MS
EKG QTC CALCULATION (BAZETT): 492 MS
EKG R AXIS: 85 DEGREES
EKG T AXIS: 48 DEGREES
EKG VENTRICULAR RATE: 67 BPM
Lab: NORMAL
MRSA, DNA, NASAL: NORMAL
SPECIMEN DESCRIPTION: NORMAL
SPECIMEN DESCRIPTION: NORMAL

## 2021-12-02 PROCEDURE — 93010 ELECTROCARDIOGRAM REPORT: CPT | Performed by: INTERNAL MEDICINE

## 2021-12-03 ENCOUNTER — TELEPHONE (OUTPATIENT)
Dept: CARDIOTHORACIC SURGERY | Age: 61
End: 2021-12-03

## 2021-12-03 ENCOUNTER — HOSPITAL ENCOUNTER (OUTPATIENT)
Dept: CT IMAGING | Age: 61
Discharge: HOME | End: 2021-12-05
Payer: MEDICARE

## 2021-12-03 DIAGNOSIS — I34.0 MITRAL VALVE INSUFFICIENCY, UNSPECIFIED ETIOLOGY: ICD-10-CM

## 2021-12-03 PROCEDURE — 71250 CT THORAX DX C-: CPT

## 2021-12-03 NOTE — TELEPHONE ENCOUNTER
Called patient and she states that she goes to Dr. Sarthak Velez at [facility] in [place]. Called their office and spoke to Alicia Whittaker at the answering service. She states that the office is closed and will not be open until Monday. Explained to her that patient needs risk stratification for surgery on Monday. She verbalized understanding and states that she will call the on call physician for this weekend and give us a call back once she hears back from him. Spoke to [employer] at Neurology office she states that she is going to talk to the RN regarding this and get back to us on Monday.

## 2021-12-05 ENCOUNTER — ANESTHESIA EVENT (OUTPATIENT)
Dept: OPERATING ROOM | Age: 61
DRG: 220 | End: 2021-12-05
Payer: MEDICARE

## 2021-12-06 ASSESSMENT — COPD QUESTIONNAIRES: CAT_SEVERITY: NO INTERVAL CHANGE

## 2021-12-06 ASSESSMENT — ENCOUNTER SYMPTOMS: SHORTNESS OF BREATH: 1

## 2021-12-06 NOTE — H&P
[facility] Cardiothoracic Surgery  CONSULTATION        CC: Severe mitral regurgitation      HPI:  Ms. Ariel Zhao is a 59 y.o.  female who presents with known mitral valve regurgitation. Pertinent medical history includes atrial fibrillation with chronic anticoagulation, hx of CVA r/t carotid artery stenosis with right hemiparesis, aphasia and sleep apnea. Cardiac workup has revealed moderate to severe mitral regurgitation. Patient admits to increasing fatigue and mild dyspnea on exertion, denies chest pain and shortness of breath at rest.  She has been referred for consideration of mitral valve repair/replace vs mitral clip. PMH:    [facility] ([facility]atives)    has a past medical history of Arm pain (Right and Left), Atrial fibrillation ([facility]), Atrial fibrillation status post cardioversion [facility]), Atrial flutter ([facility] [address] Cancer ([facility] [address] Carotid artery stenosis, Cerebral artery occlusion with cerebral infarction [facility]), Chest pain (2003), CHF (congestive heart failure) ([facility] [address] COPD (chronic obstructive pulmonary disease) ([facility] [address] Depression, Former tobacco use, H/O: CVA (cerebrovascular accident), Heart palpitations, History of lateral epicondylitis of right elbow, Hyperlipidemia, Hyperparathyroidism ([facility] [address] Hypertensive chronic kidney disease with stage 1 through stage 4 chronic kidney disease, or unspecified chronic kidney disease, Long term current use of anticoagulant, Mitral valve insufficiency, LAURA (obstructive sleep apnea), Personal history of other medical treatment, Radial tunnel syndrome (Right radial tunnel release ), Restless legs, SOB (shortness of breath) (2021), and Tennis elbow (Right and left). PSH:   has a past surgical history that includes  section; Wrist ganglion excision (Right); Cardioversion (2016); Cardioversion (2019);  Colonoscopy; ablation of dysrhythmic focus (2020); transesophageal echocardiogram (10/05/2021); and Cardiac catheterization (11/08/2021). Allergies:  No Known Allergies     Medications:   Current Medication     Current Outpatient Medications:     enoxaparin (LOVENOX) 120 MG/0.8ML injection, Inject 0.45 mLs into the skin every 12 hours for 7 days, Disp: 6.3 mL, Rfl: 0    clonazePAM (KLONOPIN) 1 MG tablet, Take 1 po qhs, Disp: 90 tablet, Rfl: 0    alendronate (FOSAMAX) 70 MG tablet, Take 70 mg by mouth every 7 days Takes on Tuesday. , Disp: , Rfl:     gabapentin (NEURONTIN) 800 MG tablet, TAKE ONE TABLET BY MOUTH EVERY NIGHT AT BEDTIME, Disp: 30 tablet, Rfl: 2    fluticasone (FLONASE) 50 MCG/ACT nasal spray, 1 spray by Each Nostril route daily, Disp: , Rfl:     spironolactone (ALDACTONE) 25 MG tablet, Take 25 mg by mouth daily, Disp: , Rfl:     Warfarin Sodium (COUMADIN PO), Take 2.5 mg by mouth Daily with supper 2.5mg 3x week 1.25mg 4x week, Disp: , Rfl:     Calcium Carb-Cholecalciferol (CALCIUM 600+D) 600-800 MG-UNIT TABS, Take 1 tablet by mouth 2 times daily , Disp: , Rfl:     furosemide (LASIX) 40 MG tablet, Take 80 mg by mouth 2 times daily , Disp: , Rfl:     atorvastatin (LIPITOR) 10 MG tablet, Take 10 mg by mouth nightly , Disp: , Rfl:     Potassium Chloride Natalie CR (KLOR-CON M20 PO), Take 40 mEq by mouth 2 times daily , Disp: , Rfl:     Fluticasone-Salmeterol (ADVAIR HFA IN), Inhale 2 puffs into the lungs 2 times daily , Disp: , Rfl:     baclofen (LIORESAL) 20 MG tablet, Take 20 mg by mouth daily , Disp: , Rfl:     desvenlafaxine succinate (PRISTIQ) 100 MG TB24, Take 100 mg by mouth daily, Disp: , Rfl:     clonazePAM (KLONOPIN) 0.5 MG tablet, Take two tablets (1 mg) by mouth nightly for restless legs (Patient taking differently: Take 1 mg by mouth nightly as needed.  Take two tablets (1 mg) by mouth nightly for restless legs), Disp: 180 tablet, Rfl: 0    rOPINIRole (REQUIP) 1 MG tablet, TAKE 1 TABLET BY MOUTH UPON RISING, 3 TABLET BY MOUTH  AT 6 PM AND 3 TABLETS BY  MOUTH AT BEDTIME, Disp: 630 tablet, Rfl: 3    amiodarone (CORDARONE) 200 MG tablet, Take 200 mg by mouth 2 times daily  (Patient not taking: Reported on 12/1/2021), Disp: , Rfl:         Social Hx:   reports that she has quit smoking. She has never used smokeless tobacco.     Family Hx:  family history includes Cancer in her mother and sister; No Known Problems in her father; Peripheral Vascular Disease in her sister. ROS:    Review of Systems   Constitutional: Positive for activity change and fatigue. Negative for chills and fever. HENT: Negative for congestion. Eyes: Negative for visual disturbance. Respiratory: Positive for shortness of breath. Cardiovascular: Positive for palpitations and leg swelling. Negative for chest pain. Gastrointestinal: Negative for abdominal pain, constipation and diarrhea. Genitourinary: Negative for dysuria. Musculoskeletal: Negative for gait problem. Skin: Negative for color change. Neurological: Negative for dizziness and weakness. Psychiatric/Behavioral: Negative for suicidal ideas. The patient is nervous/anxious. Physical Examination     Vitals:      Vitals:     12/01/21 0908   BP: 107/64   Pulse: 64   Resp: 18   Temp: 97.3 °F (36.3 °C)   SpO2: 100%      Physical Exam  Vitals reviewed. Constitutional:       General: She is not in acute distress. Appearance: Normal appearance. She is normal weight. She is not ill-appearing. HENT:      Head: Normocephalic. Nose: Nose normal.      Mouth/Throat:      Mouth: Mucous membranes are moist.      Pharynx: Oropharynx is clear. Eyes:      Extraocular Movements: Extraocular movements intact. Conjunctiva/sclera: Conjunctivae normal.      Pupils: Pupils are equal, round, and reactive to light. Cardiovascular:      Rate and Rhythm: Normal rate and regular rhythm. Pulses: Normal pulses. Heart sounds: Normal heart sounds.    Pulmonary:      Effort: Pulmonary effort is normal.      Breath sounds: Normal breath sounds. Abdominal:      Palpations: Abdomen is soft. Tenderness: There is no abdominal tenderness. Musculoskeletal:         General: Normal range of motion. Cervical back: Normal range of motion. Right lower leg: No edema. Left lower leg: No edema. Skin:     General: Skin is warm and dry. Capillary Refill: Capillary refill takes less than 2 seconds. Neurological:      General: No focal deficit present. Mental Status: She is alert and oriented to person, place, and time. Psychiatric:         Mood and Affect: Mood normal.         Behavior: Behavior normal.            Labs:   CBC: No results for input(s): WBC, HGB, HCT, MCV, PLT in the last 72 hours. BMP:No results for input(s): NA, K, CL, CO2, PHOS, BUN, CREATININE, MG in the last 72 hours. Invalid input(s): CA  Accucheck Glucoses:No results for input(s): POCGLU in the last 72 hours. Cardiac Enzymes: No results for input(s): CKTOTAL, CKMB, CKMBINDEX, TROPONINI in the last 72 hours. PT/INR: No results for input(s): PROTIME, INR in the last 72 hours. APTT: No results for input(s): APTT in the last 72 hours.   Liver Profile:  No results found for: AST, ALT, ALB, BILIDIR, BILITOT, ALKPHOS, GGT, 5NUC        Lab Results   Component Value Date     CHOL 181 01/25/2016     HDL 52 01/25/2016     TRIG 92 01/25/2016      TSH: No results found for: TSH  UA:         Lab Results   Component Value Date     COLORU YELLOW 01/24/2016     PHUR 6.0 01/24/2016     WBCUA 0 TO 2 01/24/2016     RBCUA 0 TO 2 01/24/2016     MUCUS 1+ 01/24/2016     TRICHOMONAS NOT REPORTED 01/24/2016     YEAST NOT REPORTED 01/24/2016     BACTERIA FEW 01/24/2016     SPECGRAV 1.031 01/24/2016     LEUKOCYTESUR NEGATIVE 01/24/2016     UROBILINOGEN Normal 01/24/2016     BILIRUBINUR NEGATIVE 01/24/2016     GLUCOSEU NEGATIVE 01/24/2016     AMORPHOUS NOT REPORTED 01/24/2016            Testing:  Cardiac cath:   Upper normal right heart pressure   Preserved LV function Normal coronary arteries      Recommendations:    Mitral clip evaluation based on SAUL and severity of MR      Signature:    ----------------------------------------------------------------   Electronically signed by Marnie Aburto(Performing Physician) on   11/08/2021 11:45   ----------------------------------------------------------------      Angiographic Findings: Cardiac Arteries and Lesion Findings     LMCA: Normal 0% stenosis. LAD: Normal 0% stenosis. LCx: Normal 0% stenosis. RCA: Normal 0% stenosis. Coronary Tree Dominance: Right     LV Analysis  LV function assessed as:Normal.  Ejection Fraction  +----------------------------------------------------------------------+---+  ! Method                                                                ! EF%! +----------------------------------------------------------------------+---+  ! LV gram                                                               !55 !  +----------------------------------------------------------------------+---+     Echo:        CT scan: To be ordered per Structural Heart team     STS Calculation:        both mitral valve replacement and mitral valve repair needs to be calculated and included in documentation. [email or web address]. Risk of mortality to surgical risk: Low risk < 3%. Intermediate risk 3 to 7%. High risk >/= 8%. This calculation may or may not accurately reflect the patient's clinic presentation, therefore final risk assessment to be completed by the cardiothoracic surgeon. Imaging Studies:  I have personally reviewed the testing/images with Dr Jagdeep Farris, and he is in agreement with the findings listed above. Assessment:    · Mitral regurgitation, moderate to severe  · Tricuspid regurgitation, Moderate to severe  ? RVSP 43 mmHg  · Moderate left atrial enlargement  · Paroxysmal atrial fibrillation   ? History of cardioversion  ?  S/p Cryoablation with Dr Rossy Mahan 6/11/2020  ? Bradycardia with occasional PVCs per Cardiology charting  · Obstructive sleep apnea  · CKD stage 3  ? Follows with Nephrology  · History of CVA   ? Follows with Dr Juan Olivia, Neurology  ? Arterial ischemic left carotid artery  ? Arterial ischemic middle cerebral artery  ? Residual right sided hemiparesis with mobility impairment & global asphasia  · Chronic anticoagulation secondary to above on Coumadin  · Restless leg syndrome       Plan: [facility] CLARIBEL MAZE today.     GARDENIA Sarmiento

## 2021-12-06 NOTE — TELEPHONE ENCOUNTER
Spoke to Etienne at Dr. Arredondo Dys office. She states she is going to send a message to the nephrologist as a high priority and get back to us regarding this as soon as possible. Spoke to Colleen Melendez from [employer]. She requested a Clearance Request form. Faxed one over with surgical information on it.

## 2021-12-07 ENCOUNTER — HOSPITAL ENCOUNTER (INPATIENT)
Age: 61
LOS: 7 days | Discharge: HOME HEALTH | DRG: 220 | End: 2021-12-14
Attending: THORACIC SURGERY (CARDIOTHORACIC VASCULAR SURGERY) | Admitting: THORACIC SURGERY (CARDIOTHORACIC VASCULAR SURGERY)
Payer: MEDICARE

## 2021-12-07 ENCOUNTER — APPOINTMENT (OUTPATIENT)
Dept: GENERAL RADIOLOGY | Age: 61
DRG: 220 | End: 2021-12-07
Attending: THORACIC SURGERY (CARDIOTHORACIC VASCULAR SURGERY)
Payer: MEDICARE

## 2021-12-07 ENCOUNTER — ANESTHESIA (OUTPATIENT)
Dept: OPERATING ROOM | Age: 61
DRG: 220 | End: 2021-12-07
Payer: MEDICARE

## 2021-12-07 VITALS — OXYGEN SATURATION: 100 % | SYSTOLIC BLOOD PRESSURE: 88 MMHG | DIASTOLIC BLOOD PRESSURE: 42 MMHG | TEMPERATURE: 96.2 F

## 2021-12-07 DIAGNOSIS — Z95.2 S/P MVR (MITRAL VALVE REPLACEMENT): Primary | Chronic | ICD-10-CM

## 2021-12-07 PROBLEM — I34.0 SEVERE MITRAL REGURGITATION: Status: ACTIVE | Noted: 2021-12-07

## 2021-12-07 LAB
ALLEN TEST: ABNORMAL
ANION GAP SERPL CALCULATED.3IONS-SCNC: 11 MMOL/L (ref 9–17)
ANION GAP: 10 MMOL/L (ref 7–16)
ANION GAP: 13 MMOL/L (ref 7–16)
ANION GAP: 13 MMOL/L (ref 7–16)
ANION GAP: 14 MMOL/L (ref 7–16)
ANION GAP: 14 MMOL/L (ref 7–16)
BUN BLDV-MCNC: 15 MG/DL (ref 8–23)
BUN/CREAT BLD: ABNORMAL (ref 9–20)
CALCIUM IONIZED: 1.14 MMOL/L (ref 1.13–1.33)
CALCIUM SERPL-MCNC: 8.9 MG/DL (ref 8.6–10.4)
CHLORIDE BLD-SCNC: 107 MMOL/L (ref 98–107)
CO2: 23 MMOL/L (ref 20–31)
CREAT SERPL-MCNC: 0.81 MG/DL (ref 0.5–0.9)
FIO2: 100
FIO2: 40
FIO2: ABNORMAL
GFR AFRICAN AMERICAN: >60 ML/MIN
GFR NON-AFRICAN AMERICAN: 60 ML/MIN
GFR NON-AFRICAN AMERICAN: >60 ML/MIN
GFR SERPL CREATININE-BSD FRML MDRD: >60 ML/MIN
GFR SERPL CREATININE-BSD FRML MDRD: ABNORMAL ML/MIN/{1.73_M2}
GLUCOSE BLD-MCNC: 104 MG/DL (ref 65–105)
GLUCOSE BLD-MCNC: 115 MG/DL (ref 65–105)
GLUCOSE BLD-MCNC: 115 MG/DL (ref 74–100)
GLUCOSE BLD-MCNC: 119 MG/DL (ref 74–100)
GLUCOSE BLD-MCNC: 125 MG/DL (ref 65–105)
GLUCOSE BLD-MCNC: 125 MG/DL (ref 74–100)
GLUCOSE BLD-MCNC: 126 MG/DL (ref 70–99)
GLUCOSE BLD-MCNC: 132 MG/DL (ref 74–100)
GLUCOSE BLD-MCNC: 134 MG/DL (ref 74–100)
GLUCOSE BLD-MCNC: 68 MG/DL (ref 65–105)
GLUCOSE BLD-MCNC: 81 MG/DL (ref 65–105)
GLUCOSE BLD-MCNC: 94 MG/DL (ref 74–100)
GLUCOSE BLD-MCNC: 97 MG/DL (ref 65–105)
HCT VFR BLD CALC: 32.2 % (ref 36.3–47.1)
HCT VFR BLD CALC: 42.9 % (ref 36.3–47.1)
HEMOGLOBIN: 10.1 G/DL (ref 11.9–15.1)
HEMOGLOBIN: 13.8 G/DL (ref 11.9–15.1)
INR BLD: 1
INR BLD: 1.2
MAGNESIUM: 3.3 MG/DL (ref 1.6–2.6)
MCH RBC QN AUTO: 29.5 PG (ref 25.2–33.5)
MCH RBC QN AUTO: 29.9 PG (ref 25.2–33.5)
MCHC RBC AUTO-ENTMCNC: 31.4 G/DL (ref 28.4–34.8)
MCHC RBC AUTO-ENTMCNC: 32.2 G/DL (ref 28.4–34.8)
MCV RBC AUTO: 91.7 FL (ref 82.6–102.9)
MCV RBC AUTO: 95.3 FL (ref 82.6–102.9)
MODE: ABNORMAL
NEGATIVE BASE EXCESS, ART: 1 (ref 0–2)
NEGATIVE BASE EXCESS, ART: ABNORMAL (ref 0–2)
NRBC AUTOMATED: 0 PER 100 WBC
NRBC AUTOMATED: 0 PER 100 WBC
O2 DEVICE/FLOW/%: ABNORMAL
PARTIAL THROMBOPLASTIN TIME: 27.6 SEC (ref 20.5–30.5)
PATIENT TEMP: ABNORMAL
PDW BLD-RTO: 15.1 % (ref 11.8–14.4)
PDW BLD-RTO: 15.6 % (ref 11.8–14.4)
PLATELET # BLD: 157 K/UL (ref 138–453)
PLATELET # BLD: ABNORMAL K/UL (ref 138–453)
PLATELET, FLUORESCENCE: 133 K/UL (ref 138–453)
PLATELET, IMMATURE FRACTION: 3.1 % (ref 1.1–10.3)
PMV BLD AUTO: 10 FL (ref 8.1–13.5)
PMV BLD AUTO: ABNORMAL FL (ref 8.1–13.5)
POC BUN: 15 MG/DL (ref 8–26)
POC CHLORIDE: 100 MMOL/L (ref 98–107)
POC CHLORIDE: 103 MMOL/L (ref 98–107)
POC CHLORIDE: 103 MMOL/L (ref 98–107)
POC CHLORIDE: 105 MMOL/L (ref 98–107)
POC CHLORIDE: 98 MMOL/L (ref 98–107)
POC CREATININE: 0.95 MG/DL (ref 0.51–1.19)
POC HCO3: 21.3 MMOL/L (ref 21–28)
POC HCO3: 26.3 MMOL/L (ref 21–28)
POC HCO3: 26.7 MMOL/L (ref 21–28)
POC HCO3: 26.9 MMOL/L (ref 21–28)
POC HCO3: 27.9 MMOL/L (ref 21–28)
POC HCO3: 27.9 MMOL/L (ref 21–28)
POC HCO3: 28.8 MMOL/L (ref 21–28)
POC HEMATOCRIT: 28 % (ref 36–46)
POC HEMATOCRIT: 30 % (ref 36–46)
POC HEMATOCRIT: 30 % (ref 36–46)
POC HEMATOCRIT: 31 % (ref 36–46)
POC HEMATOCRIT: 36 % (ref 36–46)
POC HEMATOCRIT: 41 % (ref 36–46)
POC HEMOGLOBIN: 10.1 G/DL (ref 12–16)
POC HEMOGLOBIN: 10.3 G/DL (ref 12–16)
POC HEMOGLOBIN: 10.6 G/DL (ref 12–16)
POC HEMOGLOBIN: 12.1 G/DL (ref 12–16)
POC HEMOGLOBIN: 13.8 G/DL (ref 12–16)
POC HEMOGLOBIN: 9.6 G/DL (ref 12–16)
POC IONIZED CALCIUM: 0.94 MMOL/L (ref 1.15–1.33)
POC IONIZED CALCIUM: 0.97 MMOL/L (ref 1.15–1.33)
POC IONIZED CALCIUM: 1.04 MMOL/L (ref 1.15–1.33)
POC IONIZED CALCIUM: 1.16 MMOL/L (ref 1.15–1.33)
POC IONIZED CALCIUM: 1.26 MMOL/L (ref 1.15–1.33)
POC IONIZED CALCIUM: 1.33 MMOL/L (ref 1.15–1.33)
POC IONIZED CALCIUM: 1.58 MMOL/L (ref 1.15–1.33)
POC LACTIC ACID: 1.8 MMOL/L (ref 0.56–1.39)
POC O2 SATURATION: 100 % (ref 94–98)
POC O2 SATURATION: 97 % (ref 94–98)
POC PCO2 TEMP: ABNORMAL MM HG
POC PCO2: 26.8 MM HG (ref 35–48)
POC PCO2: 33.5 MM HG (ref 35–48)
POC PCO2: 35.1 MM HG (ref 35–48)
POC PCO2: 35.1 MM HG (ref 35–48)
POC PCO2: 36.3 MM HG (ref 35–48)
POC PCO2: 37.5 MM HG (ref 35–48)
POC PCO2: 38.4 MM HG (ref 35–48)
POC PH TEMP: ABNORMAL
POC PH: 7.45 (ref 7.35–7.45)
POC PH: 7.46 (ref 7.35–7.45)
POC PH: 7.48 (ref 7.35–7.45)
POC PH: 7.49 (ref 7.35–7.45)
POC PH: 7.51 (ref 7.35–7.45)
POC PH: 7.52 (ref 7.35–7.45)
POC PH: 7.53 (ref 7.35–7.45)
POC PO2 TEMP: ABNORMAL MM HG
POC PO2: 184.8 MM HG (ref 83–108)
POC PO2: 283.7 MM HG (ref 83–108)
POC PO2: 336.6 MM HG (ref 83–108)
POC PO2: 413.5 MM HG (ref 83–108)
POC PO2: 438.2 MM HG (ref 83–108)
POC PO2: 445.7 MM HG (ref 83–108)
POC PO2: 79.2 MM HG (ref 83–108)
POC POTASSIUM: 3 MMOL/L (ref 3.5–4.5)
POC POTASSIUM: 3 MMOL/L (ref 3.5–4.5)
POC POTASSIUM: 3.3 MMOL/L (ref 3.5–4.5)
POC POTASSIUM: 3.4 MMOL/L (ref 3.5–4.5)
POC POTASSIUM: 3.7 MMOL/L (ref 3.5–4.5)
POC POTASSIUM: 3.9 MMOL/L (ref 3.5–4.5)
POC SODIUM: 139 MMOL/L (ref 138–146)
POC SODIUM: 139 MMOL/L (ref 138–146)
POC SODIUM: 140 MMOL/L (ref 138–146)
POC SODIUM: 140 MMOL/L (ref 138–146)
POC SODIUM: 143 MMOL/L (ref 138–146)
POC SODIUM: 144 MMOL/L (ref 138–146)
POC TCO2: 27 MMOL/L (ref 22–30)
POC TCO2: 27 MMOL/L (ref 22–30)
POC TCO2: 28 MMOL/L (ref 22–30)
POC TCO2: 28 MMOL/L (ref 22–30)
POC TCO2: 29 MMOL/L (ref 22–30)
POSITIVE BASE EXCESS, ART: 3 (ref 0–3)
POSITIVE BASE EXCESS, ART: 4 (ref 0–3)
POSITIVE BASE EXCESS, ART: 5 (ref 0–3)
POSITIVE BASE EXCESS, ART: 6 (ref 0–3)
POSITIVE BASE EXCESS, ART: ABNORMAL (ref 0–3)
POTASSIUM SERPL-SCNC: 3.1 MMOL/L (ref 3.7–5.3)
POTASSIUM SERPL-SCNC: 3.2 MMOL/L (ref 3.7–5.3)
POTASSIUM SERPL-SCNC: 4.6 MMOL/L (ref 3.7–5.3)
PROTHROMBIN TIME: 10.4 SEC (ref 9.1–12.3)
PROTHROMBIN TIME: 12.5 SEC (ref 9.1–12.3)
RBC # BLD: 3.38 M/UL (ref 3.95–5.11)
RBC # BLD: 4.68 M/UL (ref 3.95–5.11)
SAMPLE SITE: ABNORMAL
SARS-COV-2, RAPID: NOT DETECTED
SODIUM BLD-SCNC: 141 MMOL/L (ref 135–144)
SPECIMEN DESCRIPTION: NORMAL
TCO2 (CALC), ART: ABNORMAL MMOL/L (ref 22–29)
WBC # BLD: 10.2 K/UL (ref 3.5–11.3)
WBC # BLD: 14 K/UL (ref 3.5–11.3)

## 2021-12-07 PROCEDURE — 6360000002 HC RX W HCPCS: Performed by: NURSE ANESTHETIST, CERTIFIED REGISTERED

## 2021-12-07 PROCEDURE — 76937 US GUIDE VASCULAR ACCESS: CPT

## 2021-12-07 PROCEDURE — 2580000003 HC RX 258

## 2021-12-07 PROCEDURE — 6360000002 HC RX W HCPCS

## 2021-12-07 PROCEDURE — 2000000000 HC ICU R&B

## 2021-12-07 PROCEDURE — 87635 SARS-COV-2 COVID-19 AMP PRB: CPT

## 2021-12-07 PROCEDURE — 94640 AIRWAY INHALATION TREATMENT: CPT

## 2021-12-07 PROCEDURE — 37799 UNLISTED PX VASCULAR SURGERY: CPT

## 2021-12-07 PROCEDURE — 94761 N-INVAS EAR/PLS OXIMETRY MLT: CPT

## 2021-12-07 PROCEDURE — 85576 BLOOD PLATELET AGGREGATION: CPT

## 2021-12-07 PROCEDURE — 6370000000 HC RX 637 (ALT 250 FOR IP): Performed by: NURSE PRACTITIONER

## 2021-12-07 PROCEDURE — 94002 VENT MGMT INPAT INIT DAY: CPT

## 2021-12-07 PROCEDURE — 2700000000 HC OXYGEN THERAPY PER DAY

## 2021-12-07 PROCEDURE — 33430 REPLACEMENT OF MITRAL VALVE: CPT | Performed by: THORACIC SURGERY (CARDIOTHORACIC VASCULAR SURGERY)

## 2021-12-07 PROCEDURE — 85014 HEMATOCRIT: CPT

## 2021-12-07 PROCEDURE — 6360000002 HC RX W HCPCS: Performed by: NURSE PRACTITIONER

## 2021-12-07 PROCEDURE — 82435 ASSAY OF BLOOD CHLORIDE: CPT

## 2021-12-07 PROCEDURE — 71045 X-RAY EXAM CHEST 1 VIEW: CPT

## 2021-12-07 PROCEDURE — 82803 BLOOD GASES ANY COMBINATION: CPT

## 2021-12-07 PROCEDURE — 84132 ASSAY OF SERUM POTASSIUM: CPT

## 2021-12-07 PROCEDURE — 2720000010 HC SURG SUPPLY STERILE: Performed by: THORACIC SURGERY (CARDIOTHORACIC VASCULAR SURGERY)

## 2021-12-07 PROCEDURE — C1713 ANCHOR/SCREW BN/BN,TIS/BN: HCPCS | Performed by: THORACIC SURGERY (CARDIOTHORACIC VASCULAR SURGERY)

## 2021-12-07 PROCEDURE — 93005 ELECTROCARDIOGRAM TRACING: CPT | Performed by: NURSE PRACTITIONER

## 2021-12-07 PROCEDURE — 2500000003 HC RX 250 WO HCPCS: Performed by: NURSE ANESTHETIST, CERTIFIED REGISTERED

## 2021-12-07 PROCEDURE — 85390 FIBRINOLYSINS SCREEN I&R: CPT

## 2021-12-07 PROCEDURE — 85347 COAGULATION TIME ACTIVATED: CPT

## 2021-12-07 PROCEDURE — 02RG0JZ REPLACEMENT OF MITRAL VALVE WITH SYNTHETIC SUBSTITUTE, OPEN APPROACH: ICD-10-PCS | Performed by: THORACIC SURGERY (CARDIOTHORACIC VASCULAR SURGERY)

## 2021-12-07 PROCEDURE — 88305 TISSUE EXAM BY PATHOLOGIST: CPT

## 2021-12-07 PROCEDURE — 2500000003 HC RX 250 WO HCPCS

## 2021-12-07 PROCEDURE — 2780000006 HC MISC HEART VALVE: Performed by: THORACIC SURGERY (CARDIOTHORACIC VASCULAR SURGERY)

## 2021-12-07 PROCEDURE — 85027 COMPLETE CBC AUTOMATED: CPT

## 2021-12-07 PROCEDURE — 7100000001 HC PACU RECOVERY - ADDTL 15 MIN

## 2021-12-07 PROCEDURE — 85384 FIBRINOGEN ACTIVITY: CPT

## 2021-12-07 PROCEDURE — 85610 PROTHROMBIN TIME: CPT

## 2021-12-07 PROCEDURE — 3700000000 HC ANESTHESIA ATTENDED CARE: Performed by: THORACIC SURGERY (CARDIOTHORACIC VASCULAR SURGERY)

## 2021-12-07 PROCEDURE — 83735 ASSAY OF MAGNESIUM: CPT

## 2021-12-07 PROCEDURE — P9041 ALBUMIN (HUMAN),5%, 50ML: HCPCS | Performed by: NURSE PRACTITIONER

## 2021-12-07 PROCEDURE — 2100000001 HC CVICU R&B

## 2021-12-07 PROCEDURE — 82374 ASSAY BLOOD CARBON DIOXIDE: CPT

## 2021-12-07 PROCEDURE — 6370000000 HC RX 637 (ALT 250 FOR IP): Performed by: PHYSICIAN ASSISTANT

## 2021-12-07 PROCEDURE — 33259 ABLATE ATRIA W/BYPASS ADD-ON: CPT | Performed by: THORACIC SURGERY (CARDIOTHORACIC VASCULAR SURGERY)

## 2021-12-07 PROCEDURE — 6370000000 HC RX 637 (ALT 250 FOR IP): Performed by: THORACIC SURGERY (CARDIOTHORACIC VASCULAR SURGERY)

## 2021-12-07 PROCEDURE — 83605 ASSAY OF LACTIC ACID: CPT

## 2021-12-07 PROCEDURE — 02580ZZ DESTRUCTION OF CONDUCTION MECHANISM, OPEN APPROACH: ICD-10-PCS | Performed by: THORACIC SURGERY (CARDIOTHORACIC VASCULAR SURGERY)

## 2021-12-07 PROCEDURE — 80048 BASIC METABOLIC PNL TOTAL CA: CPT

## 2021-12-07 PROCEDURE — 2500000003 HC RX 250 WO HCPCS: Performed by: NURSE PRACTITIONER

## 2021-12-07 PROCEDURE — 3600000018 HC SURGERY OHS ADDTL 15MIN: Performed by: THORACIC SURGERY (CARDIOTHORACIC VASCULAR SURGERY)

## 2021-12-07 PROCEDURE — 84295 ASSAY OF SERUM SODIUM: CPT

## 2021-12-07 PROCEDURE — 85055 RETICULATED PLATELET ASSAY: CPT

## 2021-12-07 PROCEDURE — 82947 ASSAY GLUCOSE BLOOD QUANT: CPT

## 2021-12-07 PROCEDURE — 85730 THROMBOPLASTIN TIME PARTIAL: CPT

## 2021-12-07 PROCEDURE — 2709999900 HC NON-CHARGEABLE SUPPLY: Performed by: THORACIC SURGERY (CARDIOTHORACIC VASCULAR SURGERY)

## 2021-12-07 PROCEDURE — 82330 ASSAY OF CALCIUM: CPT

## 2021-12-07 PROCEDURE — 5A1221Z PERFORMANCE OF CARDIAC OUTPUT, CONTINUOUS: ICD-10-PCS | Performed by: THORACIC SURGERY (CARDIOTHORACIC VASCULAR SURGERY)

## 2021-12-07 PROCEDURE — 3700000001 HC ADD 15 MINUTES (ANESTHESIA): Performed by: THORACIC SURGERY (CARDIOTHORACIC VASCULAR SURGERY)

## 2021-12-07 PROCEDURE — 3600000008 HC SURGERY OHS BASE: Performed by: THORACIC SURGERY (CARDIOTHORACIC VASCULAR SURGERY)

## 2021-12-07 PROCEDURE — 6360000002 HC RX W HCPCS: Performed by: PHYSICIAN ASSISTANT

## 2021-12-07 PROCEDURE — 84520 ASSAY OF UREA NITROGEN: CPT

## 2021-12-07 PROCEDURE — C9113 INJ PANTOPRAZOLE SODIUM, VIA: HCPCS | Performed by: NURSE PRACTITIONER

## 2021-12-07 PROCEDURE — 2580000003 HC RX 258: Performed by: NURSE PRACTITIONER

## 2021-12-07 PROCEDURE — 80051 ELECTROLYTE PANEL: CPT

## 2021-12-07 PROCEDURE — 7100000000 HC PACU RECOVERY - FIRST 15 MIN

## 2021-12-07 PROCEDURE — 82565 ASSAY OF CREATININE: CPT

## 2021-12-07 PROCEDURE — C1889 IMPLANT/INSERT DEVICE, NOC: HCPCS | Performed by: THORACIC SURGERY (CARDIOTHORACIC VASCULAR SURGERY)

## 2021-12-07 PROCEDURE — 6360000002 HC RX W HCPCS: Performed by: THORACIC SURGERY (CARDIOTHORACIC VASCULAR SURGERY)

## 2021-12-07 DEVICE — IMPLANTABLE DEVICE: Type: IMPLANTABLE DEVICE | Site: HEART | Status: FUNCTIONAL

## 2021-12-07 DEVICE — VALVE MI 27MM PERICARD HRT THERMAFIX PROC PERIMT MAGNA MI: Type: IMPLANTABLE DEVICE | Site: HEART | Status: FUNCTIONAL

## 2021-12-07 DEVICE — Z INACTIVE USE 2424445 DEVICE OCCL CLP L50MM SM FOOTPRINT 1 HND APPL SUTURELESS W/: Type: IMPLANTABLE DEVICE | Site: HEART | Status: FUNCTIONAL

## 2021-12-07 DEVICE — DEVICE STRNL CLSR MULTIIMPLANT STRNLOCK 360: Type: IMPLANTABLE DEVICE | Site: HEART | Status: FUNCTIONAL

## 2021-12-07 RX ORDER — SODIUM CHLORIDE 0.9 % (FLUSH) 0.9 %
10 SYRINGE (ML) INJECTION PRN
Status: DISCONTINUED | OUTPATIENT
Start: 2021-12-07 | End: 2021-12-14 | Stop reason: HOSPADM

## 2021-12-07 RX ORDER — SODIUM CHLORIDE, SODIUM LACTATE, POTASSIUM CHLORIDE, CALCIUM CHLORIDE 600; 310; 30; 20 MG/100ML; MG/100ML; MG/100ML; MG/100ML
INJECTION, SOLUTION INTRAVENOUS CONTINUOUS PRN
Status: DISCONTINUED | OUTPATIENT
Start: 2021-12-07 | End: 2021-12-07 | Stop reason: SDUPTHER

## 2021-12-07 RX ORDER — EPHEDRINE SULFATE/0.9% NACL/PF 50 MG/5 ML
SYRINGE (ML) INTRAVENOUS PRN
Status: DISCONTINUED | OUTPATIENT
Start: 2021-12-07 | End: 2021-12-07 | Stop reason: SDUPTHER

## 2021-12-07 RX ORDER — INSULIN GLARGINE 100 [IU]/ML
0.15 INJECTION, SOLUTION SUBCUTANEOUS NIGHTLY
Status: DISCONTINUED | OUTPATIENT
Start: 2021-12-08 | End: 2021-12-08

## 2021-12-07 RX ORDER — MORPHINE SULFATE 10 MG/ML
INJECTION, SOLUTION INTRAMUSCULAR; INTRAVENOUS PRN
Status: DISCONTINUED | OUTPATIENT
Start: 2021-12-07 | End: 2021-12-07 | Stop reason: SDUPTHER

## 2021-12-07 RX ORDER — PROPOFOL 10 MG/ML
INJECTION, EMULSION INTRAVENOUS PRN
Status: DISCONTINUED | OUTPATIENT
Start: 2021-12-07 | End: 2021-12-07 | Stop reason: SDUPTHER

## 2021-12-07 RX ORDER — PROPOFOL 10 MG/ML
INJECTION, EMULSION INTRAVENOUS CONTINUOUS PRN
Status: DISCONTINUED | OUTPATIENT
Start: 2021-12-07 | End: 2021-12-07 | Stop reason: SDUPTHER

## 2021-12-07 RX ORDER — NITROGLYCERIN 20 MG/100ML
INJECTION INTRAVENOUS PRN
Status: DISCONTINUED | OUTPATIENT
Start: 2021-12-07 | End: 2021-12-07 | Stop reason: SDUPTHER

## 2021-12-07 RX ORDER — IPRATROPIUM BROMIDE AND ALBUTEROL SULFATE 2.5; .5 MG/3ML; MG/3ML
1 SOLUTION RESPIRATORY (INHALATION)
Status: DISCONTINUED | OUTPATIENT
Start: 2021-12-07 | End: 2021-12-14 | Stop reason: HOSPADM

## 2021-12-07 RX ORDER — SODIUM CHLORIDE 9 MG/ML
INJECTION, SOLUTION INTRAVENOUS CONTINUOUS PRN
Status: DISCONTINUED | OUTPATIENT
Start: 2021-12-07 | End: 2021-12-07 | Stop reason: SDUPTHER

## 2021-12-07 RX ORDER — OXYCODONE HYDROCHLORIDE AND ACETAMINOPHEN 5; 325 MG/1; MG/1
2 TABLET ORAL EVERY 4 HOURS PRN
Status: DISCONTINUED | OUTPATIENT
Start: 2021-12-07 | End: 2021-12-14 | Stop reason: HOSPADM

## 2021-12-07 RX ORDER — HEPARIN SODIUM 1000 [USP'U]/ML
INJECTION, SOLUTION INTRAVENOUS; SUBCUTANEOUS PRN
Status: DISCONTINUED | OUTPATIENT
Start: 2021-12-07 | End: 2021-12-07 | Stop reason: SDUPTHER

## 2021-12-07 RX ORDER — MIDAZOLAM HYDROCHLORIDE 1 MG/ML
INJECTION INTRAMUSCULAR; INTRAVENOUS PRN
Status: DISCONTINUED | OUTPATIENT
Start: 2021-12-07 | End: 2021-12-07 | Stop reason: SDUPTHER

## 2021-12-07 RX ORDER — ONDANSETRON 2 MG/ML
4 INJECTION INTRAMUSCULAR; INTRAVENOUS EVERY 8 HOURS PRN
Status: DISCONTINUED | OUTPATIENT
Start: 2021-12-07 | End: 2021-12-14 | Stop reason: HOSPADM

## 2021-12-07 RX ORDER — DEXTROSE MONOHYDRATE 25 G/50ML
12.5 INJECTION, SOLUTION INTRAVENOUS PRN
Status: DISCONTINUED | OUTPATIENT
Start: 2021-12-07 | End: 2021-12-14 | Stop reason: HOSPADM

## 2021-12-07 RX ORDER — ASPIRIN 81 MG/1
81 TABLET ORAL DAILY
Status: DISCONTINUED | OUTPATIENT
Start: 2021-12-07 | End: 2021-12-14 | Stop reason: HOSPADM

## 2021-12-07 RX ORDER — PHENYLEPHRINE HCL IN 0.9% NACL 1 MG/10 ML
SYRINGE (ML) INTRAVENOUS PRN
Status: DISCONTINUED | OUTPATIENT
Start: 2021-12-07 | End: 2021-12-07 | Stop reason: SDUPTHER

## 2021-12-07 RX ORDER — FENTANYL CITRATE 0.05 MG/ML
INJECTION, SOLUTION INTRAMUSCULAR; INTRAVENOUS PRN
Status: DISCONTINUED | OUTPATIENT
Start: 2021-12-07 | End: 2021-12-07 | Stop reason: SDUPTHER

## 2021-12-07 RX ORDER — MEPERIDINE HYDROCHLORIDE 50 MG/ML
25 INJECTION INTRAMUSCULAR; INTRAVENOUS; SUBCUTANEOUS
Status: ACTIVE | OUTPATIENT
Start: 2021-12-07 | End: 2021-12-07

## 2021-12-07 RX ORDER — CLONAZEPAM 1 MG/1
1 TABLET ORAL NIGHTLY
Status: DISCONTINUED | OUTPATIENT
Start: 2021-12-07 | End: 2021-12-14 | Stop reason: HOSPADM

## 2021-12-07 RX ORDER — CALCIUM GLUCONATE 20 MG/ML
2000 INJECTION, SOLUTION INTRAVENOUS ONCE
Status: COMPLETED | OUTPATIENT
Start: 2021-12-07 | End: 2021-12-07

## 2021-12-07 RX ORDER — SODIUM CHLORIDE 0.9 % (FLUSH) 0.9 %
5-40 SYRINGE (ML) INJECTION EVERY 12 HOURS SCHEDULED
Status: DISCONTINUED | OUTPATIENT
Start: 2021-12-07 | End: 2021-12-07

## 2021-12-07 RX ORDER — CEFAZOLIN SODIUM 2 G/50ML
SOLUTION INTRAVENOUS PRN
Status: DISCONTINUED | OUTPATIENT
Start: 2021-12-07 | End: 2021-12-07 | Stop reason: SDUPTHER

## 2021-12-07 RX ORDER — VANCOMYCIN HYDROCHLORIDE 1 G/20ML
INJECTION, POWDER, LYOPHILIZED, FOR SOLUTION INTRAVENOUS PRN
Status: DISCONTINUED | OUTPATIENT
Start: 2021-12-07 | End: 2021-12-07 | Stop reason: ALTCHOICE

## 2021-12-07 RX ORDER — SODIUM CHLORIDE 0.9 % (FLUSH) 0.9 %
10 SYRINGE (ML) INJECTION PRN
Status: DISCONTINUED | OUTPATIENT
Start: 2021-12-07 | End: 2021-12-07

## 2021-12-07 RX ORDER — HYDRALAZINE HYDROCHLORIDE 20 MG/ML
5 INJECTION INTRAMUSCULAR; INTRAVENOUS EVERY 5 MIN PRN
Status: DISCONTINUED | OUTPATIENT
Start: 2021-12-07 | End: 2021-12-14 | Stop reason: HOSPADM

## 2021-12-07 RX ORDER — VANCOMYCIN HYDROCHLORIDE 1 G/200ML
1000 INJECTION, SOLUTION INTRAVENOUS
Status: DISCONTINUED | OUTPATIENT
Start: 2021-12-07 | End: 2021-12-07

## 2021-12-07 RX ORDER — ATORVASTATIN CALCIUM 80 MG/1
80 TABLET, FILM COATED ORAL NIGHTLY
Status: DISCONTINUED | OUTPATIENT
Start: 2021-12-08 | End: 2021-12-08

## 2021-12-07 RX ORDER — SODIUM CHLORIDE 9 MG/ML
25 INJECTION, SOLUTION INTRAVENOUS PRN
Status: DISCONTINUED | OUTPATIENT
Start: 2021-12-07 | End: 2021-12-14 | Stop reason: HOSPADM

## 2021-12-07 RX ORDER — ACETAMINOPHEN 325 MG/1
650 TABLET ORAL EVERY 4 HOURS PRN
Status: DISCONTINUED | OUTPATIENT
Start: 2021-12-07 | End: 2021-12-14 | Stop reason: HOSPADM

## 2021-12-07 RX ORDER — DIPHENHYDRAMINE HCL 25 MG
25 TABLET ORAL NIGHTLY PRN
Status: DISCONTINUED | OUTPATIENT
Start: 2021-12-08 | End: 2021-12-14 | Stop reason: HOSPADM

## 2021-12-07 RX ORDER — POTASSIUM CHLORIDE 29.8 MG/ML
20 INJECTION INTRAVENOUS PRN
Status: DISCONTINUED | OUTPATIENT
Start: 2021-12-07 | End: 2021-12-13

## 2021-12-07 RX ORDER — SODIUM CHLORIDE 0.9 % (FLUSH) 0.9 %
10 SYRINGE (ML) INJECTION EVERY 12 HOURS SCHEDULED
Status: DISCONTINUED | OUTPATIENT
Start: 2021-12-07 | End: 2021-12-14 | Stop reason: HOSPADM

## 2021-12-07 RX ORDER — PANTOPRAZOLE SODIUM 40 MG/10ML
40 INJECTION, POWDER, LYOPHILIZED, FOR SOLUTION INTRAVENOUS DAILY
Status: DISCONTINUED | OUTPATIENT
Start: 2021-12-07 | End: 2021-12-08

## 2021-12-07 RX ORDER — ROCURONIUM BROMIDE 10 MG/ML
INJECTION, SOLUTION INTRAVENOUS PRN
Status: DISCONTINUED | OUTPATIENT
Start: 2021-12-07 | End: 2021-12-07 | Stop reason: SDUPTHER

## 2021-12-07 RX ORDER — SODIUM CHLORIDE 9 MG/ML
25 INJECTION, SOLUTION INTRAVENOUS PRN
Status: DISCONTINUED | OUTPATIENT
Start: 2021-12-07 | End: 2021-12-07

## 2021-12-07 RX ORDER — AMIODARONE HYDROCHLORIDE 200 MG/1
200 TABLET ORAL DAILY
Status: DISCONTINUED | OUTPATIENT
Start: 2021-12-07 | End: 2021-12-07

## 2021-12-07 RX ORDER — SODIUM CHLORIDE, SODIUM LACTATE, POTASSIUM CHLORIDE, CALCIUM CHLORIDE 600; 310; 30; 20 MG/100ML; MG/100ML; MG/100ML; MG/100ML
INJECTION, SOLUTION INTRAVENOUS CONTINUOUS
Status: DISCONTINUED | OUTPATIENT
Start: 2021-12-07 | End: 2021-12-07

## 2021-12-07 RX ORDER — OXYCODONE HYDROCHLORIDE AND ACETAMINOPHEN 5; 325 MG/1; MG/1
1 TABLET ORAL EVERY 4 HOURS PRN
Status: DISCONTINUED | OUTPATIENT
Start: 2021-12-07 | End: 2021-12-14 | Stop reason: HOSPADM

## 2021-12-07 RX ORDER — PROPOFOL 10 MG/ML
10 INJECTION, EMULSION INTRAVENOUS CONTINUOUS
Status: DISCONTINUED | OUTPATIENT
Start: 2021-12-07 | End: 2021-12-08

## 2021-12-07 RX ORDER — CLOPIDOGREL BISULFATE 75 MG/1
75 TABLET ORAL DAILY
Status: DISCONTINUED | OUTPATIENT
Start: 2021-12-08 | End: 2021-12-08

## 2021-12-07 RX ORDER — VANCOMYCIN HYDROCHLORIDE 1 G/20ML
INJECTION, POWDER, LYOPHILIZED, FOR SOLUTION INTRAVENOUS PRN
Status: DISCONTINUED | OUTPATIENT
Start: 2021-12-07 | End: 2021-12-07 | Stop reason: SDUPTHER

## 2021-12-07 RX ORDER — NOREPINEPHRINE BIT/0.9 % NACL 16MG/250ML
0.2 INFUSION BOTTLE (ML) INTRAVENOUS CONTINUOUS PRN
Status: DISCONTINUED | OUTPATIENT
Start: 2021-12-07 | End: 2021-12-13

## 2021-12-07 RX ORDER — BACLOFEN 10 MG/1
20 TABLET ORAL NIGHTLY
Status: DISCONTINUED | OUTPATIENT
Start: 2021-12-07 | End: 2021-12-14 | Stop reason: HOSPADM

## 2021-12-07 RX ORDER — GLUCAGON 1 MG/ML
1 KIT INJECTION PRN
Status: DISCONTINUED | OUTPATIENT
Start: 2021-12-07 | End: 2021-12-14 | Stop reason: HOSPADM

## 2021-12-07 RX ORDER — POLYETHYLENE GLYCOL 3350 17 G/17G
17 POWDER, FOR SOLUTION ORAL DAILY
Status: DISCONTINUED | OUTPATIENT
Start: 2021-12-07 | End: 2021-12-14 | Stop reason: HOSPADM

## 2021-12-07 RX ORDER — METOPROLOL TARTRATE 5 MG/5ML
2.5 INJECTION INTRAVENOUS EVERY 10 MIN PRN
Status: DISCONTINUED | OUTPATIENT
Start: 2021-12-07 | End: 2021-12-14 | Stop reason: HOSPADM

## 2021-12-07 RX ORDER — SODIUM CHLORIDE, SODIUM LACTATE, POTASSIUM CHLORIDE, CALCIUM CHLORIDE 600; 310; 30; 20 MG/100ML; MG/100ML; MG/100ML; MG/100ML
1000 INJECTION, SOLUTION INTRAVENOUS CONTINUOUS
Status: DISCONTINUED | OUTPATIENT
Start: 2021-12-07 | End: 2021-12-07

## 2021-12-07 RX ORDER — SODIUM CHLORIDE 9 MG/ML
10 INJECTION INTRAVENOUS DAILY
Status: DISCONTINUED | OUTPATIENT
Start: 2021-12-07 | End: 2021-12-08

## 2021-12-07 RX ORDER — FENTANYL CITRATE 50 UG/ML
25 INJECTION, SOLUTION INTRAMUSCULAR; INTRAVENOUS
Status: DISCONTINUED | OUTPATIENT
Start: 2021-12-07 | End: 2021-12-14 | Stop reason: HOSPADM

## 2021-12-07 RX ORDER — MAGNESIUM SULFATE 1 G/100ML
1000 INJECTION INTRAVENOUS PRN
Status: DISCONTINUED | OUTPATIENT
Start: 2021-12-07 | End: 2021-12-14 | Stop reason: HOSPADM

## 2021-12-07 RX ORDER — GLYCOPYRROLATE 1 MG/5 ML
SYRINGE (ML) INTRAVENOUS PRN
Status: DISCONTINUED | OUTPATIENT
Start: 2021-12-07 | End: 2021-12-07 | Stop reason: SDUPTHER

## 2021-12-07 RX ORDER — PROTAMINE SULFATE 10 MG/ML
INJECTION, SOLUTION INTRAVENOUS
Status: COMPLETED
Start: 2021-12-07 | End: 2021-12-07

## 2021-12-07 RX ORDER — NICOTINE POLACRILEX 4 MG
15 LOZENGE BUCCAL PRN
Status: DISCONTINUED | OUTPATIENT
Start: 2021-12-07 | End: 2021-12-14 | Stop reason: HOSPADM

## 2021-12-07 RX ORDER — LIDOCAINE HYDROCHLORIDE 10 MG/ML
INJECTION, SOLUTION EPIDURAL; INFILTRATION; INTRACAUDAL; PERINEURAL PRN
Status: DISCONTINUED | OUTPATIENT
Start: 2021-12-07 | End: 2021-12-07 | Stop reason: SDUPTHER

## 2021-12-07 RX ORDER — CHLORHEXIDINE GLUCONATE 4 G/100ML
SOLUTION TOPICAL SEE ADMIN INSTRUCTIONS
Status: DISCONTINUED | OUTPATIENT
Start: 2021-12-07 | End: 2021-12-07

## 2021-12-07 RX ORDER — SODIUM PHOSPHATE, DIBASIC AND SODIUM PHOSPHATE, MONOBASIC 7; 19 G/133ML; G/133ML
1 ENEMA RECTAL DAILY PRN
Status: DISCONTINUED | OUTPATIENT
Start: 2021-12-07 | End: 2021-12-14 | Stop reason: HOSPADM

## 2021-12-07 RX ORDER — AMIODARONE HYDROCHLORIDE 200 MG/1
200 TABLET ORAL 3 TIMES DAILY
Status: DISCONTINUED | OUTPATIENT
Start: 2021-12-07 | End: 2021-12-09

## 2021-12-07 RX ORDER — FENTANYL CITRATE 50 UG/ML
50 INJECTION, SOLUTION INTRAMUSCULAR; INTRAVENOUS
Status: DISCONTINUED | OUTPATIENT
Start: 2021-12-07 | End: 2021-12-14 | Stop reason: HOSPADM

## 2021-12-07 RX ORDER — PROTAMINE SULFATE 10 MG/ML
INJECTION, SOLUTION INTRAVENOUS PRN
Status: DISCONTINUED | OUTPATIENT
Start: 2021-12-07 | End: 2021-12-07 | Stop reason: SDUPTHER

## 2021-12-07 RX ORDER — VANCOMYCIN HYDROCHLORIDE 1 G/20ML
INJECTION, POWDER, LYOPHILIZED, FOR SOLUTION INTRAVENOUS
Status: COMPLETED
Start: 2021-12-07 | End: 2021-12-07

## 2021-12-07 RX ORDER — ASPIRIN 81 MG/1
81 TABLET ORAL
Status: DISCONTINUED | OUTPATIENT
Start: 2021-12-07 | End: 2021-12-07

## 2021-12-07 RX ORDER — PROPOFOL 10 MG/ML
INJECTION, EMULSION INTRAVENOUS
Status: COMPLETED
Start: 2021-12-07 | End: 2021-12-07

## 2021-12-07 RX ORDER — PANTOPRAZOLE SODIUM 40 MG/1
40 TABLET, DELAYED RELEASE ORAL DAILY
Status: DISCONTINUED | OUTPATIENT
Start: 2021-12-07 | End: 2021-12-14 | Stop reason: HOSPADM

## 2021-12-07 RX ORDER — PROTAMINE SULFATE 10 MG/ML
50 INJECTION, SOLUTION INTRAVENOUS
Status: ACTIVE | OUTPATIENT
Start: 2021-12-07 | End: 2021-12-07

## 2021-12-07 RX ORDER — ALBUMIN, HUMAN INJ 5% 5 %
25 SOLUTION INTRAVENOUS PRN
Status: DISCONTINUED | OUTPATIENT
Start: 2021-12-07 | End: 2021-12-14 | Stop reason: HOSPADM

## 2021-12-07 RX ORDER — VANCOMYCIN HYDROCHLORIDE 1 G/200ML
1000 INJECTION, SOLUTION INTRAVENOUS EVERY 12 HOURS
Status: COMPLETED | OUTPATIENT
Start: 2021-12-07 | End: 2021-12-08

## 2021-12-07 RX ORDER — CHLORHEXIDINE GLUCONATE 0.12 MG/ML
15 RINSE ORAL ONCE
Status: COMPLETED | OUTPATIENT
Start: 2021-12-07 | End: 2021-12-07

## 2021-12-07 RX ORDER — FENTANYL CITRATE 50 UG/ML
INJECTION, SOLUTION INTRAMUSCULAR; INTRAVENOUS
Status: COMPLETED
Start: 2021-12-07 | End: 2021-12-07

## 2021-12-07 RX ORDER — DEXTROSE MONOHYDRATE 50 MG/ML
100 INJECTION, SOLUTION INTRAVENOUS PRN
Status: DISCONTINUED | OUTPATIENT
Start: 2021-12-07 | End: 2021-12-14 | Stop reason: HOSPADM

## 2021-12-07 RX ADMIN — SODIUM CHLORIDE, SODIUM LACTATE, POTASSIUM CHLORIDE, CALCIUM CHLORIDE: 600; 310; 30; 20 INJECTION, SOLUTION INTRAVENOUS at 09:12

## 2021-12-07 RX ADMIN — FENTANYL CITRATE 100 MCG: 50 INJECTION INTRAVENOUS at 10:20

## 2021-12-07 RX ADMIN — FENTANYL CITRATE 50 MCG: 50 INJECTION, SOLUTION INTRAMUSCULAR; INTRAVENOUS at 19:32

## 2021-12-07 RX ADMIN — DEXTROSE MONOHYDRATE 12.5 G: 25 INJECTION, SOLUTION INTRAVENOUS at 16:08

## 2021-12-07 RX ADMIN — ROCURONIUM BROMIDE 50 MG: 10 INJECTION INTRAVENOUS at 10:31

## 2021-12-07 RX ADMIN — POLYETHYLENE GLYCOL 3350 17 G: 17 POWDER, FOR SOLUTION ORAL at 15:53

## 2021-12-07 RX ADMIN — LIDOCAINE HYDROCHLORIDE 50 MG: 10 INJECTION, SOLUTION EPIDURAL; INFILTRATION; INTRACAUDAL; PERINEURAL at 09:16

## 2021-12-07 RX ADMIN — Medication 100 MCG: at 13:42

## 2021-12-07 RX ADMIN — MORPHINE SULFATE 10 MG: 10 INJECTION, SOLUTION INTRAMUSCULAR; INTRAVENOUS at 13:21

## 2021-12-07 RX ADMIN — Medication 10 MG: at 09:37

## 2021-12-07 RX ADMIN — SODIUM CHLORIDE: 900 INJECTION, SOLUTION INTRAVENOUS at 09:46

## 2021-12-07 RX ADMIN — FENTANYL CITRATE 100 MCG: 50 INJECTION INTRAVENOUS at 13:16

## 2021-12-07 RX ADMIN — FENTANYL CITRATE 100 MCG: 50 INJECTION INTRAVENOUS at 13:03

## 2021-12-07 RX ADMIN — Medication 10 MG: at 09:59

## 2021-12-07 RX ADMIN — MIDAZOLAM HYDROCHLORIDE 2 MG: 1 INJECTION, SOLUTION INTRAMUSCULAR; INTRAVENOUS at 12:58

## 2021-12-07 RX ADMIN — FENTANYL CITRATE 25 MCG: 50 INJECTION, SOLUTION INTRAMUSCULAR; INTRAVENOUS at 14:08

## 2021-12-07 RX ADMIN — POTASSIUM CHLORIDE 20 MEQ: 29.8 INJECTION INTRAVENOUS at 19:28

## 2021-12-07 RX ADMIN — NITROGLYCERIN 20 MCG: 20 INJECTION INTRAVENOUS at 13:22

## 2021-12-07 RX ADMIN — FENTANYL CITRATE 100 MCG: 50 INJECTION INTRAVENOUS at 10:50

## 2021-12-07 RX ADMIN — ALBUMIN (HUMAN) 25 G: 12.5 INJECTION, SOLUTION INTRAVENOUS at 15:25

## 2021-12-07 RX ADMIN — PROPOFOL 25 MCG/KG/MIN: 10 INJECTION, EMULSION INTRAVENOUS at 13:04

## 2021-12-07 RX ADMIN — CEFAZOLIN SODIUM 2000 MG: 10 INJECTION, POWDER, FOR SOLUTION INTRAVENOUS at 19:35

## 2021-12-07 RX ADMIN — CEFAZOLIN SODIUM 2000 MG: 10 INJECTION, POWDER, FOR SOLUTION INTRAVENOUS at 12:56

## 2021-12-07 RX ADMIN — FENTANYL CITRATE 50 MCG: 50 INJECTION INTRAVENOUS at 10:56

## 2021-12-07 RX ADMIN — MUPIROCIN: 20 OINTMENT TOPICAL at 20:12

## 2021-12-07 RX ADMIN — FENTANYL CITRATE 100 MCG: 50 INJECTION INTRAVENOUS at 13:13

## 2021-12-07 RX ADMIN — ALBUMIN (HUMAN) 25 G: 12.5 INJECTION, SOLUTION INTRAVENOUS at 14:16

## 2021-12-07 RX ADMIN — HEPARIN SODIUM 24000 UNITS: 1000 INJECTION, SOLUTION INTRAVENOUS; SUBCUTANEOUS at 10:59

## 2021-12-07 RX ADMIN — MUPIROCIN: 20 OINTMENT TOPICAL at 07:57

## 2021-12-07 RX ADMIN — AMINOCAPROIC ACID 10 G/HR: 250 INJECTION, SOLUTION INTRAVENOUS at 09:44

## 2021-12-07 RX ADMIN — CALCIUM GLUCONATE 2000 MG: 20 INJECTION, SOLUTION INTRAVENOUS at 16:45

## 2021-12-07 RX ADMIN — NITROGLYCERIN 20 MCG: 20 INJECTION INTRAVENOUS at 13:15

## 2021-12-07 RX ADMIN — CHLORHEXIDINE GLUCONATE 0.12% ORAL RINSE 15 ML: 1.2 LIQUID ORAL at 06:07

## 2021-12-07 RX ADMIN — NITROGLYCERIN 20 MCG: 20 INJECTION INTRAVENOUS at 13:19

## 2021-12-07 RX ADMIN — FENTANYL CITRATE 50 MCG: 50 INJECTION INTRAVENOUS at 13:02

## 2021-12-07 RX ADMIN — FENTANYL CITRATE 100 MCG: 50 INJECTION INTRAVENOUS at 10:46

## 2021-12-07 RX ADMIN — FENTANYL CITRATE 100 MCG: 50 INJECTION INTRAVENOUS at 10:15

## 2021-12-07 RX ADMIN — POTASSIUM CHLORIDE 20 MEQ: 29.8 INJECTION INTRAVENOUS at 15:55

## 2021-12-07 RX ADMIN — FENTANYL CITRATE 50 MCG: 50 INJECTION INTRAVENOUS at 10:58

## 2021-12-07 RX ADMIN — CEFAZOLIN SODIUM 2000 MG: 2 SOLUTION INTRAVENOUS at 09:58

## 2021-12-07 RX ADMIN — Medication 10 MG: at 09:43

## 2021-12-07 RX ADMIN — AMIODARONE HYDROCHLORIDE 200 MG: 200 TABLET ORAL at 06:10

## 2021-12-07 RX ADMIN — IPRATROPIUM BROMIDE AND ALBUTEROL SULFATE 1 AMPULE: .5; 2.5 SOLUTION RESPIRATORY (INHALATION) at 16:35

## 2021-12-07 RX ADMIN — PROPOFOL INJECTABLE EMULSION 130 MG: 10 INJECTION, EMULSION INTRAVENOUS at 09:16

## 2021-12-07 RX ADMIN — PROTAMINE SULFATE 50 MG: 10 INJECTION, SOLUTION INTRAVENOUS at 13:11

## 2021-12-07 RX ADMIN — OXYCODONE HYDROCHLORIDE AND ACETAMINOPHEN 1 TABLET: 5; 325 TABLET ORAL at 19:31

## 2021-12-07 RX ADMIN — Medication 10 MG: at 10:05

## 2021-12-07 RX ADMIN — FENTANYL CITRATE 50 MCG: 50 INJECTION INTRAVENOUS at 10:31

## 2021-12-07 RX ADMIN — SODIUM CHLORIDE, SODIUM LACTATE, POTASSIUM CHLORIDE, CALCIUM CHLORIDE: 600; 310; 30; 20 INJECTION, SOLUTION INTRAVENOUS at 12:44

## 2021-12-07 RX ADMIN — POTASSIUM CHLORIDE 20 MEQ: 29.8 INJECTION INTRAVENOUS at 20:50

## 2021-12-07 RX ADMIN — FENTANYL CITRATE 50 MCG: 50 INJECTION INTRAVENOUS at 09:16

## 2021-12-07 RX ADMIN — FENTANYL CITRATE 100 MCG: 50 INJECTION INTRAVENOUS at 13:28

## 2021-12-07 RX ADMIN — ASPIRIN 81 MG: 81 TABLET, COATED ORAL at 07:58

## 2021-12-07 RX ADMIN — ROCURONIUM BROMIDE 30 MG: 10 INJECTION INTRAVENOUS at 12:53

## 2021-12-07 RX ADMIN — MIDAZOLAM HYDROCHLORIDE 2 MG: 1 INJECTION, SOLUTION INTRAMUSCULAR; INTRAVENOUS at 09:16

## 2021-12-07 RX ADMIN — Medication 100 MCG: at 12:48

## 2021-12-07 RX ADMIN — SODIUM CHLORIDE, PRESERVATIVE FREE 10 ML: 5 INJECTION INTRAVENOUS at 20:12

## 2021-12-07 RX ADMIN — POTASSIUM CHLORIDE 20 MEQ: 29.8 INJECTION INTRAVENOUS at 14:57

## 2021-12-07 RX ADMIN — IPRATROPIUM BROMIDE AND ALBUTEROL SULFATE 1 AMPULE: .5; 2.5 SOLUTION RESPIRATORY (INHALATION) at 20:12

## 2021-12-07 RX ADMIN — Medication 10 MG: at 09:28

## 2021-12-07 RX ADMIN — Medication 100 MCG: at 13:37

## 2021-12-07 RX ADMIN — VANCOMYCIN HYDROCHLORIDE 1000 MG: 1 INJECTION, POWDER, LYOPHILIZED, FOR SOLUTION INTRAVENOUS at 10:13

## 2021-12-07 RX ADMIN — DEXTROSE MONOHYDRATE 2000 MG: 50 INJECTION, SOLUTION INTRAVENOUS at 19:35

## 2021-12-07 RX ADMIN — FENTANYL CITRATE 50 MCG: 50 INJECTION INTRAVENOUS at 10:35

## 2021-12-07 RX ADMIN — OXYCODONE HYDROCHLORIDE AND ACETAMINOPHEN 1 TABLET: 5; 325 TABLET ORAL at 23:49

## 2021-12-07 RX ADMIN — VANCOMYCIN HYDROCHLORIDE 1000 MG: 1 INJECTION, SOLUTION INTRAVENOUS at 21:12

## 2021-12-07 RX ADMIN — ROCURONIUM BROMIDE 20 MG: 10 INJECTION INTRAVENOUS at 13:33

## 2021-12-07 RX ADMIN — PROTAMINE SULFATE 250 MG: 10 INJECTION, SOLUTION INTRAVENOUS at 12:46

## 2021-12-07 RX ADMIN — SODIUM CHLORIDE, SODIUM LACTATE, POTASSIUM CHLORIDE, CALCIUM CHLORIDE: 600; 310; 30; 20 INJECTION, SOLUTION INTRAVENOUS at 09:30

## 2021-12-07 RX ADMIN — ROCURONIUM BROMIDE 50 MG: 10 INJECTION INTRAVENOUS at 09:16

## 2021-12-07 RX ADMIN — Medication 0.2 MG: at 10:05

## 2021-12-07 RX ADMIN — ALBUMIN (HUMAN) 25 G: 12.5 INJECTION, SOLUTION INTRAVENOUS at 16:16

## 2021-12-07 RX ADMIN — SODIUM CHLORIDE, SODIUM LACTATE, POTASSIUM CHLORIDE, CALCIUM CHLORIDE: 600; 310; 30; 20 INJECTION, SOLUTION INTRAVENOUS at 13:40

## 2021-12-07 RX ADMIN — SODIUM CHLORIDE, PRESERVATIVE FREE 10 ML: 5 INJECTION INTRAVENOUS at 15:54

## 2021-12-07 RX ADMIN — Medication 100 MCG: at 10:07

## 2021-12-07 RX ADMIN — Medication 100 MCG: at 13:32

## 2021-12-07 RX ADMIN — PANTOPRAZOLE SODIUM 40 MG: 40 INJECTION, POWDER, FOR SOLUTION INTRAVENOUS at 15:53

## 2021-12-07 RX ADMIN — FENTANYL CITRATE 200 MCG: 50 INJECTION INTRAVENOUS at 10:53

## 2021-12-07 ASSESSMENT — PULMONARY FUNCTION TESTS
PIF_VALUE: 1
PIF_VALUE: 14
PIF_VALUE: 13
PIF_VALUE: 1
PIF_VALUE: 16
PIF_VALUE: 16
PIF_VALUE: 1
PIF_VALUE: 1
PIF_VALUE: 15
PIF_VALUE: 16
PIF_VALUE: 15
PIF_VALUE: 1
PIF_VALUE: 18
PIF_VALUE: 15
PIF_VALUE: 20
PIF_VALUE: 15
PIF_VALUE: 1
PIF_VALUE: 15
PIF_VALUE: 1
PIF_VALUE: 1
PIF_VALUE: 16
PIF_VALUE: 14
PIF_VALUE: 20
PIF_VALUE: 14
PIF_VALUE: 16
PIF_VALUE: 15
PIF_VALUE: 15
PIF_VALUE: 1
PIF_VALUE: 13
PIF_VALUE: 13
PIF_VALUE: 1
PIF_VALUE: 1
PIF_VALUE: 13
PIF_VALUE: 14
PIF_VALUE: 1
PIF_VALUE: 15
PIF_VALUE: 16
PIF_VALUE: 1
PIF_VALUE: 15
PIF_VALUE: 15
PIF_VALUE: 1
PIF_VALUE: 15
PIF_VALUE: 2
PIF_VALUE: 16
PIF_VALUE: 15
PIF_VALUE: 4
PIF_VALUE: 16
PIF_VALUE: 16
PIF_VALUE: 14
PIF_VALUE: 1
PIF_VALUE: 15
PIF_VALUE: 16
PIF_VALUE: 1
PIF_VALUE: 14
PIF_VALUE: 15
PIF_VALUE: 16
PIF_VALUE: 1
PIF_VALUE: 1
PIF_VALUE: 15
PIF_VALUE: 15
PIF_VALUE: 1
PIF_VALUE: 22
PIF_VALUE: 1
PIF_VALUE: 16
PIF_VALUE: 1
PIF_VALUE: 8
PIF_VALUE: 15
PIF_VALUE: 16
PIF_VALUE: 1
PIF_VALUE: 1
PIF_VALUE: 15
PIF_VALUE: 1
PIF_VALUE: 1
PIF_VALUE: 16
PIF_VALUE: 1
PIF_VALUE: 1
PIF_VALUE: 15
PIF_VALUE: 15
PIF_VALUE: 1
PIF_VALUE: 1
PIF_VALUE: 15
PIF_VALUE: 1
PIF_VALUE: 16
PIF_VALUE: 15
PIF_VALUE: 16
PIF_VALUE: 14
PIF_VALUE: 13
PIF_VALUE: 1
PIF_VALUE: 16
PIF_VALUE: 1
PIF_VALUE: 14
PIF_VALUE: 1
PIF_VALUE: 15
PIF_VALUE: 14
PIF_VALUE: 1
PIF_VALUE: 15
PIF_VALUE: 1
PIF_VALUE: 15
PIF_VALUE: 16
PIF_VALUE: 15
PIF_VALUE: 14
PIF_VALUE: 1
PIF_VALUE: 15
PIF_VALUE: 1
PIF_VALUE: 16
PIF_VALUE: 3
PIF_VALUE: 1
PIF_VALUE: 12
PIF_VALUE: 17
PIF_VALUE: 15
PIF_VALUE: 16
PIF_VALUE: 1
PIF_VALUE: 15
PIF_VALUE: 10
PIF_VALUE: 14
PIF_VALUE: 16
PIF_VALUE: 14
PIF_VALUE: 16
PIF_VALUE: 16
PIF_VALUE: 1
PIF_VALUE: 16
PIF_VALUE: 1
PIF_VALUE: 15
PIF_VALUE: 1
PIF_VALUE: 15
PIF_VALUE: 1
PIF_VALUE: 15
PIF_VALUE: 16
PIF_VALUE: 14
PIF_VALUE: 16
PIF_VALUE: 16
PIF_VALUE: 1
PIF_VALUE: 16
PIF_VALUE: 16
PIF_VALUE: 15
PIF_VALUE: 1
PIF_VALUE: 17
PIF_VALUE: 1
PIF_VALUE: 8
PIF_VALUE: 1
PIF_VALUE: 1
PIF_VALUE: 16
PIF_VALUE: 16
PIF_VALUE: 1
PIF_VALUE: 1
PIF_VALUE: 16
PIF_VALUE: 1
PIF_VALUE: 1
PIF_VALUE: 15
PIF_VALUE: 18
PIF_VALUE: 15
PIF_VALUE: 1
PIF_VALUE: 1
PIF_VALUE: 16
PIF_VALUE: 15
PIF_VALUE: 16
PIF_VALUE: 14
PIF_VALUE: 1
PIF_VALUE: 15
PIF_VALUE: 1
PIF_VALUE: 16
PIF_VALUE: 14
PIF_VALUE: 15
PIF_VALUE: 1
PIF_VALUE: 14
PIF_VALUE: 14
PIF_VALUE: 16
PIF_VALUE: 1
PIF_VALUE: 1
PIF_VALUE: 15
PIF_VALUE: 13
PIF_VALUE: 15
PIF_VALUE: 16
PIF_VALUE: 13
PIF_VALUE: 16
PIF_VALUE: 21
PIF_VALUE: 16
PIF_VALUE: 1
PIF_VALUE: 1
PIF_VALUE: 15
PIF_VALUE: 1
PIF_VALUE: 1
PIF_VALUE: 15
PIF_VALUE: 14
PIF_VALUE: 16
PIF_VALUE: 18
PIF_VALUE: 1
PIF_VALUE: 1
PIF_VALUE: 15
PIF_VALUE: 14
PIF_VALUE: 12
PIF_VALUE: 16
PIF_VALUE: 16
PIF_VALUE: 15
PIF_VALUE: 1
PIF_VALUE: 16
PIF_VALUE: 15
PIF_VALUE: 15
PIF_VALUE: 1
PIF_VALUE: 15
PIF_VALUE: 18
PIF_VALUE: 16
PIF_VALUE: 15
PIF_VALUE: 14
PIF_VALUE: 15
PIF_VALUE: 15
PIF_VALUE: 1
PIF_VALUE: 1
PIF_VALUE: 15
PIF_VALUE: 1
PIF_VALUE: 14
PIF_VALUE: 15
PIF_VALUE: 1
PIF_VALUE: 1
PIF_VALUE: 15
PIF_VALUE: 15
PIF_VALUE: 16
PIF_VALUE: 15
PIF_VALUE: 1
PIF_VALUE: 15
PIF_VALUE: 1
PIF_VALUE: 16
PIF_VALUE: 1
PIF_VALUE: 13
PIF_VALUE: 15
PIF_VALUE: 16
PIF_VALUE: 15
PIF_VALUE: 16
PIF_VALUE: 15
PIF_VALUE: 1
PIF_VALUE: 15
PIF_VALUE: 1
PIF_VALUE: 15
PIF_VALUE: 16
PIF_VALUE: 15
PIF_VALUE: 16
PIF_VALUE: 15
PIF_VALUE: 1
PIF_VALUE: 15
PIF_VALUE: 15
PIF_VALUE: 1
PIF_VALUE: 14
PIF_VALUE: 16
PIF_VALUE: 1
PIF_VALUE: 1
PIF_VALUE: 15
PIF_VALUE: 16

## 2021-12-07 NOTE — PROGRESS NOTES
David Parker NP notified of hyperglycemic event, POC BS 68, 125 after half amp of D50.  Order to hold insulin gtt until BG greater than 160

## 2021-12-07 NOTE — ANESTHESIA PROCEDURE NOTES
Central Venous Line:    A central venous line was placed using surface landmarks, in the OR for the following indication(s): central venous access and CVP monitoring. Sterility preparation included the following: hand hygiene performed prior to procedure, maximum sterile barriers used and sterile technique used to drape from head to toe. The patient was placed in Trendelenburg position. The left subclavian vein was prepped. The site was prepped with Chloraprep. A 9 Fr (size), 10 (length), introducer slick was placed. During the procedure, the following specific steps were taken: target vein identified, needle advanced into vein and blood aspirated and guidewire advanced into vein. Intravenous verification was obtained by venous blood return. Post insertion care included: all ports aspirated, all ports flushed easily, guidewire removed intact, Biopatch applied, line sutured in place and dressing applied. During the procedure the patient experienced: patient tolerated procedure well with no complications.       Insertion site scrubbed per usage guidelines?: Yes  Skin prep agent dried for 3 minutes prior to procedure?:yes  Anesthesia type: general..No  Staffing  Performed: Anesthesiologist   Anesthesiologist: Alex Beltran MD  Preanesthetic Checklist  Completed: patient identified, IV checked, site marked, risks and benefits discussed, surgical consent, monitors and equipment checked, pre-op evaluation, timeout performed, anesthesia consent given, oxygen available and patient being monitored

## 2021-12-07 NOTE — ANESTHESIA PRE PROCEDURE
Department of Anesthesiology  Preprocedure Note       Name:  Leo Meehan   Age:  59 y.o.  :  1960                                          MRN:  [id number]         Date:  2021      Surgeon: Yahir Soto):  Ruma Brandon MD    Procedure:  MITRAL VALVE REPAIR/REPLACE, MAZE, CLARIBEL (N/A )      Medications prior to admission:   Prior to Admission medications    Medication Sig Start Date End Date Taking? Authorizing Provider   enoxaparin (LOVENOX) 120 MG/0.8ML injection Inject 0.45 mLs into the skin every 12 hours for 7 days 21  SARAH Heard - CNP   clonazePAM (KLONOPIN) 1 MG tablet Take 1 po qhs 21  Claudine Calderón MD   alendronate (FOSAMAX) 70 MG tablet Take 70 mg by mouth every 7 days Takes on Tuesday.     Historical Provider, MD   gabapentin (NEURONTIN) 800 MG tablet TAKE ONE TABLET BY MOUTH EVERY NIGHT AT BEDTIME 21  Claudine Calderón MD   rOPINIRole (REQUIP) 1 MG tablet TAKE 1 TABLET BY MOUTH UPON RISING, 3 TABLET BY MOUTH  AT 6 PM AND 3 TABLETS BY  MOUTH AT BEDTIME 3/15/21 12/1/21  Claudine Calderón MD   fluticasone [facility]) 50 MCG/ACT nasal spray 1 spray by Each Nostril route daily    Historical Provider, MD   spironolactone (ALDACTONE) 25 MG tablet Take 25 mg by mouth daily    Historical Provider, MD   Warfarin Sodium (COUMADIN PO) Take 2.5 mg by mouth Daily with supper 2.5mg 3x week  1.25mg 4x week    Historical Provider, MD   Calcium Carb-Cholecalciferol (CALCIUM 600+D) 600-800 MG-UNIT TABS Take 1 tablet by mouth 2 times daily     Historical Provider, MD   furosemide (LASIX) 40 MG tablet Take 80 mg by mouth 2 times daily     Historical Provider, MD   atorvastatin (LIPITOR) 10 MG tablet Take 10 mg by mouth nightly     Historical Provider, MD   Potassium Chloride Natalie CR (KLOR-CON M20 PO) Take 40 mEq by mouth 2 times daily     Historical Provider, MD   Fluticasone-Salmeterol (ADVAIR HFA IN) Inhale 2 puffs into the lungs 2 times daily     Historical Provider, MD   amiodarone (CORDARONE) 200 MG tablet Take 200 mg by mouth 2 times daily   Patient not taking: Reported on 12/1/2021    Historical Provider, MD   baclofen (LIORESAL) 20 MG tablet Take 20 mg by mouth nightly     Historical Provider, MD   desvenlafaxine succinate (PRISTIQ) 100 MG TB24 Take 100 mg by mouth daily    Historical Provider, MD       Current medications:    Current Outpatient Medications   Medication Sig Dispense Refill    enoxaparin (LOVENOX) 120 MG/0.8ML injection Inject 0.45 mLs into the skin every 12 hours for 7 days 6.3 mL 0    clonazePAM (KLONOPIN) 1 MG tablet Take 1 po qhs 90 tablet 0    alendronate (FOSAMAX) 70 MG tablet Take 70 mg by mouth every 7 days Takes on Tuesday.       gabapentin (NEURONTIN) 800 MG tablet TAKE ONE TABLET BY MOUTH EVERY NIGHT AT BEDTIME 30 tablet 2    rOPINIRole (REQUIP) 1 MG tablet TAKE 1 TABLET BY MOUTH UPON RISING, 3 TABLET BY MOUTH  AT 6 PM AND 3 TABLETS BY  MOUTH AT BEDTIME 630 tablet 3    fluticasone (FLONASE) 50 MCG/ACT nasal spray 1 spray by Each Nostril route daily      spironolactone (ALDACTONE) 25 MG tablet Take 25 mg by mouth daily      Warfarin Sodium (COUMADIN PO) Take 2.5 mg by mouth Daily with supper 2.5mg 3x week  1.25mg 4x week      Calcium Carb-Cholecalciferol (CALCIUM 600+D) 600-800 MG-UNIT TABS Take 1 tablet by mouth 2 times daily       furosemide (LASIX) 40 MG tablet Take 80 mg by mouth 2 times daily       atorvastatin (LIPITOR) 10 MG tablet Take 10 mg by mouth nightly       Potassium Chloride Natalie CR (KLOR-CON M20 PO) Take 40 mEq by mouth 2 times daily       Fluticasone-Salmeterol (ADVAIR HFA IN) Inhale 2 puffs into the lungs 2 times daily       amiodarone (CORDARONE) 200 MG tablet Take 200 mg by mouth 2 times daily  (Patient not taking: Reported on 12/1/2021)      baclofen (LIORESAL) 20 MG tablet Take 20 mg by mouth nightly       desvenlafaxine succinate (PRISTIQ) 100 MG TB24 Take 100 mg by mouth daily tunnel syndrome Right radial tunnel release     Restless legs     SOB (shortness of breath) 2021    Tennis elbow Right and left    Under care of team 2021    Cardiologist: Jennie Ford, last visit 2021    Under care of team 2021    PCP: Dr. Dewey Schwab [place], last visit 2021    Under care of team 2021    Neurology: Dr. Manual Opitz, last visit 2021    Under care of team 2021    Urology: Dr. Sandra Peralta, last visit 10/2021    Wears glasses        Past Surgical History:        Procedure Laterality Date    ABLATION OF DYSRHYTHMIC FOCUS  2020    Dr. Fanny Dixon  2021    R-L     CARDIOVERSION  2016    failed    CARDIOVERSION  2019     SECTION      COLONOSCOPY      ENDOSCOPY, COLON, DIAGNOSTIC      EYE SURGERY      Cataracts removed, bilaterally    SKIN BIOPSY      TRANSESOPHAGEAL ECHOCARDIOGRAM  10/05/2021    severe mitral regurg    WISDOM TOOTH EXTRACTION      WRIST GANGLION EXCISION Right        Social History:    Social History     Tobacco Use    Smoking status: Former Smoker    Smokeless tobacco: Never Used   Substance Use Topics    Alcohol use: No     Alcohol/week: 0.0 standard drinks                                Counseling given: Not Answered      Vital Signs (Current): There were no vitals filed for this visit.                                            BP Readings from Last 3 Encounters:   21 130/73   21 107/64   21 125/78       NPO Status:                                                                                 BMI:   Wt Readings from Last 3 Encounters:   21 150 lb (68 kg)   21 150 lb (68 kg)   21 149 lb (67.6 kg)     There is no height or weight on file to calculate BMI.    CBC:   Lab Results   Component Value Date    WBC 7.0 2021    RBC 5.19 2021    HGB 15.2 2021    HCT 48.3 2021    MCV 93.1 2021    RDW 14.8 12/01/2021     12/01/2021       CMP:   Lab Results   Component Value Date     12/01/2021    K 4.0 12/01/2021     12/01/2021    CO2 29 12/01/2021    BUN 26 12/01/2021    CREATININE 1.03 12/01/2021    GFRAA >60 12/01/2021    LABGLOM 54 12/01/2021    GLUCOSE 88 12/01/2021    PROT 7.7 12/01/2021    CALCIUM 9.6 12/01/2021    BILITOT 0.37 12/01/2021    ALKPHOS 95 12/01/2021    AST 31 12/01/2021    ALT 33 12/01/2021       POC Tests: No results for input(s): POCGLU, POCNA, POCK, POCCL, POCBUN, POCHEMO, POCHCT in the last 72 hours. Coags:   Lab Results   Component Value Date    PROTIME 20.6 12/01/2021    PROTIME 14.5 11/08/2021    INR 2.0 12/01/2021    APTT 34.1 12/01/2021       HCG (If Applicable): No results found for: PREGTESTUR, PREGSERUM, HCG, HCGQUANT     ABGs: No results found for: PHART, PO2ART, QAU4MGG, EED1FOF, BEART, U9MNAOTV     Type & Screen (If Applicable):  No results found for: LABABO, LABRH    Drug/Infectious Status (If Applicable):  No results found for: HIV, HEPCAB    COVID-19 Screening (If Applicable):   Lab Results   Component Value Date    COVID19 Not Detected 06/08/2020       Cardiac cath: 11/8/2021  Upper normal right heart pressure  Preserved LV function  Normal coronary arteries  EF 55%    SAUL 12/3/2021  The study was performed by the Cardiologist, Cardiac Fellow, and the  Sonographer in the Cath Lab without complications. The patient tolerated the procedure well. Conscious sedation was used. No clots were visualized in the left atrial appendage. Estimated ejection fraction is 55 %  Severe eccentric mitral regurgitation .  MR volume 63 ml, Pisa radius 0.9 cm,  EOA 0.34 cm2, VC 0.9 cm  Tricuspid regurgitation, moderate to severe  Moderate left atrial enlargement      Anesthesia Evaluation  Patient summary reviewed no history of anesthetic complications:   Airway: Mallampati: II  TM distance: >3 FB   Neck ROM: full  Mouth opening: > = 3 FB Dental:    (+) other Pulmonary:normal exam    (+) COPD: no interval change,  shortness of breath:  sleep apnea:                             Cardiovascular:Negative CV ROS    (+) valvular problems/murmurs: MR, dysrhythmias: atrial fibrillation, CHF:,       ECG reviewed  Rhythm: regular  Rate: normal  Echocardiogram reviewed                  Neuro/Psych:   (+) CVA: no interval change, neuromuscular disease:, psychiatric history:depression/anxiety              ROS comment: Residual right hemiparesis, aphasia GI/Hepatic/Renal: Neg GI/Hepatic/Renal ROS            Endo/Other: Negative Endo/Other ROS                    Abdominal:             Vascular: negative vascular ROS. Other Findings:             Anesthesia Plan      general     ASA 4       Induction: intravenous. arterial line, BIS, central line, CVP and SAUL  MIPS: Postoperative ventilation. Anesthetic plan and risks discussed with patient. Use of blood products discussed with patient whom consented to blood products. Plan discussed with CRNA.                   Regla Alcaraz MD   12/6/2021

## 2021-12-07 NOTE — PROGRESS NOTES
Kathleen NP notified of both chest tubes having air leaks. Dressing changed to xeroform with foam tape. Continue to monitor.

## 2021-12-07 NOTE — BRIEF OP NOTE
Brief Postoperative Note      Patient: Mirella Hernandez  YOB: 1960  MRN: [id number]    Date of Procedure: 12/7/2021    Pre-Op Diagnosis: MITRAL VALVE REGURGE, PAF, Heart failure, volume overload    Post-Op Diagnosis: Same       Procedure(s):  MITRAL VALVE REPLACE, MAZE, CLARIBEL    Surgeon(s):  Rach Pacheco MD    Assistant:  First Assistant: Kim Dumont RN    Anesthesia: General    Estimated Blood Loss (mL): N/A    Complications: None    Specimens:   ID Type Source Tests Collected by Time Destination   A :  Tissue Heart SURGICAL PATHOLOGY Rach Pacheco MD 12/7/2021 1142        Implants:  Implant Name Type Inv.  Item Serial No.  Lot No. LRB No. Used Action   DEVICE STRNL CLSR MULTIIMPLANT STRNLOCK [id number]  AttolightET MICROFIXATION-WD  N/A 1 Implanted   DEVICE OCCL CLP L50MM SM FOOTPRINT 1 HND APPL SUTURELESS W/  DEVICE OCCL CLP L50MM SM FOOTPRINT 1 HND APPL SUTURELESS W/  ATRICURE INC-WD [id number] N/A 1 Implanted   VALVE MI 27MM PERICARD HRT THERMAFIX PROC PERIMT MAGNA MI - [id number]  VALVE MI 27MM PERICARD HRT THERMAFIX PROC PERIMT MAGNA MI [id number] [employer] IDA-WD N/A N/A 1 Implanted         Drains:   Chest Tube 1 Anterior Mediastinal 24 [place] (Active)       Chest Tube 2 Anterior Pericardial 24 [place] (Active)       Urethral Catheter Temperature probe 16 fr (Active)       Findings: LA cryomaze with PV issolation box lesions and connection through atrial appendage and down to MV isthmus P2 region, CLARIBEL ligation w/ 50mm MitraClip, MVR with 27mm Magna Ease, postop SAUL with the usual trivial-mild central jet but no PV leaks and no significant residual gradient (mean 1-2 mmHg)    Electronically signed by Rach Pacheco MD on 12/7/2021 at 1:17 PM

## 2021-12-07 NOTE — ANESTHESIA PROCEDURE NOTES
Procedure Performed: SAUL      Start Time:  12/7/2021 9:45 AM       End Time:      Preanesthesia Checklist:  Patient identified, IV assessed, risks and benefits discussed, monitors and equipment assessed, procedure being performed at surgeon's request and anesthesia consent obtained. General Procedure Information  Diagnostic Indications for Echo:  assessment of surgical repair, defect repair evaluation, hemodynamic monitoring and assessment of valve function  Physician Requesting Echo: Amparo Abraham MD  Location performed:  OR  Intubated  Heart visualized  Probe Type:  3D  Modalities:  3D, color flow mapping, continuous wave Doppler and M-mode                     Name:  Ankita Mobley                                         Age:  59 y.o. MRN:  [id number]           Procedure (Scheduled):  SAUL  Requested by Surgeon:  Dr. Mayur Persaud  Performed by Dr. Walton Maegan: Transesophageal Echo    Today's Date: 12/7/2021    Patient seen and examined. History and Physical reviewed. Labs reviewed. SAUL:    Structures:    LA: Mild Dilated  RA: Normal  RV: Normal size and function  LV: Normal size and function. Estimated LVEF 50% preop. LV apex: No LV apical thrombus identified  Aorta: Mild atheromatous disease Asc Aorta, arch and descending Aorta  Percardium: No pericardial effusion  CLARIBEL: No appendage thrombus identified  Septum: No intracardiac shunt via color Doppler. Valves:    Mitral Valve:  severe regurgitation is identified. Jets are directed posteriorly. Patient has MVP moderate Mitral stenosis. Aortic Valve: The aortic valve is trileaflet and opens adequately. noneStenosis is identified. none regurgitiation is identified. Tricuspid valve: Structurally normal. moderate regurgitation is identified. Pulmonary valve: Normal. No significant regurgitation    No valvular vegetations or thrombus identified. Summary:     1. A SAUL was performed without complications. 2. LVEF 50% preop.   3. Pre-op patient has severe regurgitation is identified. Jets are directed posteriorly. Patient has moderate MVP  4. No Aortic dissection  5. Significant findings were communicated to CTS. After MVR with bioprosthetic valve and separation from CPB the following SAUL findings were obtained:  1. LVEF is preserved at 50-55%. , no  RWMA. 2.  Mitral valve prosthesis appears to be well-seated, mean gradient is 2 mmHg,  Mild to moderate mitral regurgitation seen. This  improved over the duration of surgical closure, small central regurgitant jet remains. No perivalvular leak. 3.  Tricuspid regurgitation remains moderate. 43. Left atrium is moderately enlarged. 4.  No aortic dissection.         Electronically signed by Alex Beltran MD on 12/7/2021 at 10:47 AM

## 2021-12-07 NOTE — PLAN OF CARE
Problem: Falls - Risk of:  Goal: Will remain free from falls  Description: Will remain free from falls  12/7/2021 1804 by Rola Myers RN  Outcome: Ongoing  12/7/2021 1803 by Rola Myers RN  Outcome: Ongoing  12/7/2021 1404 by Nicole Brock RCP  Outcome: Ongoing  Goal: Absence of physical injury  Description: Absence of physical injury  12/7/2021 1804 by Rola Myers RN  Outcome: Ongoing  12/7/2021 1803 by Rola Myers RN  Outcome: Ongoing  12/7/2021 1404 by Nicole Brock RCP  Outcome: Ongoing     Problem: Non-Violent Restraints  Goal: Removal from restraints as soon as assessed to be safe  12/7/2021 1804 by Rola Myers RN  Outcome: Ongoing  12/7/2021 1803 by Rola Myers RN  Outcome: Ongoing  12/7/2021 1404 by Nicole Brock RCP  Outcome: Ongoing  Goal: No harm/injury to patient while restraints in use  12/7/2021 1804 by Rola Myers RN  Outcome: Ongoing  12/7/2021 1803 by Rola Myers RN  Outcome: Ongoing  12/7/2021 1404 by Nicole Brock RCP  Outcome: Ongoing  Goal: Patient's dignity will be maintained  12/7/2021 1804 by Rola Myers RN  Outcome: Ongoing  12/7/2021 1803 by Rola Myers RN  Outcome: Ongoing  12/7/2021 1404 by Nicole Brock RCP  Outcome: Ongoing     Problem: OXYGENATION/RESPIRATORY FUNCTION  Goal: Patient will maintain patent airway  12/7/2021 1804 by Rola Myers RN  Outcome: Ongoing  12/7/2021 1803 by Rola Myers RN  Outcome: Ongoing  12/7/2021 1404 by Nicole Brock RCP  Outcome: Ongoing  Goal: Patient will achieve/maintain normal respiratory rate/effort  Description: Respiratory rate and effort will be within normal limits for the patient  12/7/2021 1804 by Rola Myers RN  Outcome: Ongoing  12/7/2021 1803 by Rola Myers RN  Outcome: Ongoing  12/7/2021 1404 by Nicole Brock RCP  Outcome: Ongoing     Problem: MECHANICAL VENTILATION  Goal: Patient will maintain patent airway  12/7/2021 1804 by Galileo Garcia RN  Outcome: Ongoing  12/7/2021 1803 by Galileo Garcia RN  Outcome: Ongoing  12/7/2021 1404 by Love Palomares RCP  Outcome: Ongoing  Goal: Oral health is maintained or improved  12/7/2021 1804 by Galileo Garcia RN  Outcome: Ongoing  12/7/2021 1803 by Galileo Garcia RN  Outcome: Ongoing  12/7/2021 1404 by Love Palomares RCP  Outcome: Ongoing  Goal: ET tube will be managed safely  12/7/2021 1804 by Galileo Garcia RN  Outcome: Ongoing  12/7/2021 1803 by Galileo Garcia RN  Outcome: Ongoing  12/7/2021 1404 by Love Palomares RCP  Outcome: Ongoing  Goal: Ability to express needs and understand communication  12/7/2021 1804 by Galileo Garcia RN  Outcome: Ongoing  12/7/2021 1803 by Galileo Garcia RN  Outcome: Ongoing  12/7/2021 1404 by Love Palomares RCP  Outcome: Ongoing  Goal: Mobility/activity is maintained at optimum level for patient  12/7/2021 1804 by Galileo Garcia RN  Outcome: Ongoing  12/7/2021 1803 by Galileo Garcia RN  Outcome: Ongoing  12/7/2021 1404 by Love Palomares RCP  Outcome: Ongoing     Problem: SKIN INTEGRITY  Goal: Skin integrity is maintained or improved  12/7/2021 1804 by Galileo Garcia RN  Outcome: Ongoing  12/7/2021 1803 by Galileo Garcia RN  Outcome: Ongoing  12/7/2021 1404 by Love Palomares RCP  Outcome: Ongoing     Problem: Discharge Planning:  Goal: Discharged to appropriate level of care  Description: Discharged to appropriate level of care  Outcome: Ongoing     Problem: Cardiac Output - Decreased:  Goal: Cardiac output within specified parameters  Description: Cardiac output within specified parameters  Outcome: Ongoing     Problem: Infection - Surgical Site:  Goal: Will show no infection signs and symptoms  Description: Will show no infection signs and symptoms  Outcome: Ongoing     Problem: Pain - Acute:  Goal: Pain level will decrease  Description: Pain level will decrease  Outcome: Ongoing     Problem: Venous Thromboembolism:  Goal: Will show no signs or symptoms of venous thromboembolism  Description: Will show no signs or symptoms of venous thromboembolism  Outcome: Ongoing  Goal: Absence of signs or symptoms of impaired coagulation  Description: Absence of signs or symptoms of impaired coagulation  Outcome: Ongoing

## 2021-12-07 NOTE — PROGRESS NOTES
Pt. Hypotensive after receiving 3 doses albumin, increasing levophed to 0.04 mcg/kg/min, and starting epinephrine infusion at 0.01 mcg/kg/min. Writer called for ABG, ical which showed alkalosis and low ionized calcium. Dallin Alan, NP with CT surgery notified. Vent changes made, see IAF, calcium gluconate ordered. Patient continued to be in Afib with slow ventricular rate, started AV pacing with transthoracic pacing wires, see pacemaker IAF. Dallin Alan at bedside, aware.

## 2021-12-08 ENCOUNTER — APPOINTMENT (OUTPATIENT)
Dept: GENERAL RADIOLOGY | Age: 61
DRG: 220 | End: 2021-12-08
Attending: THORACIC SURGERY (CARDIOTHORACIC VASCULAR SURGERY)
Payer: MEDICARE

## 2021-12-08 LAB
ABO/RH: NORMAL
ALLEN TEST: ABNORMAL
ANION GAP SERPL CALCULATED.3IONS-SCNC: 11 MMOL/L (ref 9–17)
ANION GAP: 12 MMOL/L (ref 7–16)
ANTIBODY SCREEN: NEGATIVE
ARM BAND NUMBER: NORMAL
BLD PROD TYP BPU: NORMAL
BUN BLDV-MCNC: 12 MG/DL (ref 8–23)
BUN/CREAT BLD: ABNORMAL (ref 9–20)
CALCIUM IONIZED: 1.16 MMOL/L (ref 1.13–1.33)
CALCIUM SERPL-MCNC: 8 MG/DL (ref 8.6–10.4)
CHLORIDE BLD-SCNC: 114 MMOL/L (ref 98–107)
CO2: 19 MMOL/L (ref 20–31)
CREAT SERPL-MCNC: 0.88 MG/DL (ref 0.5–0.9)
CROSSMATCH RESULT: NORMAL
DISPENSE STATUS BLOOD BANK: NORMAL
EKG ATRIAL RATE: 42 BPM
EKG Q-T INTERVAL: 422 MS
EKG QRS DURATION: 92 MS
EKG QTC CALCULATION (BAZETT): 403 MS
EKG R AXIS: 79 DEGREES
EKG T AXIS: 143 DEGREES
EKG VENTRICULAR RATE: 55 BPM
EXPIRATION DATE: NORMAL
FIO2: 4
FIO2: 4
FIO2: 40
GFR AFRICAN AMERICAN: >60 ML/MIN
GFR NON-AFRICAN AMERICAN: >60 ML/MIN
GFR NON-AFRICAN AMERICAN: >60 ML/MIN
GFR SERPL CREATININE-BSD FRML MDRD: >60 ML/MIN
GFR SERPL CREATININE-BSD FRML MDRD: ABNORMAL ML/MIN/{1.73_M2}
GFR SERPL CREATININE-BSD FRML MDRD: ABNORMAL ML/MIN/{1.73_M2}
GFR SERPL CREATININE-BSD FRML MDRD: NORMAL ML/MIN/{1.73_M2}
GLUCOSE BLD-MCNC: 108 MG/DL (ref 74–100)
GLUCOSE BLD-MCNC: 117 MG/DL (ref 74–100)
GLUCOSE BLD-MCNC: 119 MG/DL (ref 65–105)
GLUCOSE BLD-MCNC: 122 MG/DL (ref 65–105)
GLUCOSE BLD-MCNC: 123 MG/DL (ref 74–100)
GLUCOSE BLD-MCNC: 128 MG/DL (ref 70–99)
GLUCOSE BLD-MCNC: 135 MG/DL (ref 65–105)
HCT VFR BLD CALC: 33.2 % (ref 36.3–47.1)
HCT VFR BLD CALC: 33.9 % (ref 36.3–47.1)
HEMOGLOBIN: 10.1 G/DL (ref 11.9–15.1)
HEMOGLOBIN: 10.2 G/DL (ref 11.9–15.1)
INR BLD: 1.1
MAGNESIUM: 2.3 MG/DL (ref 1.6–2.6)
MCH RBC QN AUTO: 29.2 PG (ref 25.2–33.5)
MCHC RBC AUTO-ENTMCNC: 30.1 G/DL (ref 28.4–34.8)
MCV RBC AUTO: 97.1 FL (ref 82.6–102.9)
MODE: ABNORMAL
NEGATIVE BASE EXCESS, ART: 4 (ref 0–2)
NEGATIVE BASE EXCESS, ART: 4 (ref 0–2)
NEGATIVE BASE EXCESS, ART: 5 (ref 0–2)
NEGATIVE BASE EXCESS, ART: 7 (ref 0–2)
NEGATIVE BASE EXCESS, ART: ABNORMAL (ref 0–2)
NRBC AUTOMATED: 0 PER 100 WBC
O2 DEVICE/FLOW/%: ABNORMAL
PATIENT TEMP: ABNORMAL
PDW BLD-RTO: 15.8 % (ref 11.8–14.4)
PLATELET # BLD: ABNORMAL K/UL (ref 138–453)
PLATELET, FLUORESCENCE: 141 K/UL (ref 138–453)
PLATELET, IMMATURE FRACTION: 3.9 % (ref 1.1–10.3)
PMV BLD AUTO: ABNORMAL FL (ref 8.1–13.5)
POC BUN: 11 MG/DL (ref 8–26)
POC CHLORIDE: 115 MMOL/L (ref 98–107)
POC CREATININE: 0.87 MG/DL (ref 0.51–1.19)
POC HCO3: 19.5 MMOL/L (ref 21–28)
POC HCO3: 20.8 MMOL/L (ref 21–28)
POC HCO3: 20.9 MMOL/L (ref 21–28)
POC HCO3: 22.5 MMOL/L (ref 21–28)
POC HCO3: 26.3 MMOL/L (ref 21–28)
POC HEMATOCRIT: 29 % (ref 36–46)
POC HEMOGLOBIN: 9.9 G/DL (ref 12–16)
POC IONIZED CALCIUM: 1.12 MMOL/L (ref 1.15–1.33)
POC LACTIC ACID: 0.57 MMOL/L (ref 0.56–1.39)
POC O2 SATURATION: 98 % (ref 94–98)
POC O2 SATURATION: 98 % (ref 94–98)
POC O2 SATURATION: 99 % (ref 94–98)
POC PCO2 TEMP: ABNORMAL MM HG
POC PCO2: 38 MM HG (ref 35–48)
POC PCO2: 39.9 MM HG (ref 35–48)
POC PCO2: 43.7 MM HG (ref 35–48)
POC PCO2: 46 MM HG (ref 35–48)
POC PCO2: 48.7 MM HG (ref 35–48)
POC PH TEMP: ABNORMAL
POC PH: 7.26 (ref 7.35–7.45)
POC PH: 7.27 (ref 7.35–7.45)
POC PH: 7.33 (ref 7.35–7.45)
POC PH: 7.35 (ref 7.35–7.45)
POC PH: 7.37 (ref 7.35–7.45)
POC PO2 TEMP: ABNORMAL MM HG
POC PO2: 113.8 MM HG (ref 83–108)
POC PO2: 126.6 MM HG (ref 83–108)
POC PO2: 136.6 MM HG (ref 83–108)
POC PO2: 147.7 MM HG (ref 83–108)
POC PO2: 156 MM HG (ref 83–108)
POC POTASSIUM: 3.7 MMOL/L (ref 3.5–4.5)
POC POTASSIUM: 3.9 MMOL/L (ref 3.5–4.5)
POC SODIUM: 148 MMOL/L (ref 138–146)
POC TCO2: 22 MMOL/L (ref 22–30)
POSITIVE BASE EXCESS, ART: 1 (ref 0–3)
POSITIVE BASE EXCESS, ART: ABNORMAL (ref 0–3)
POTASSIUM SERPL-SCNC: 3.5 MMOL/L (ref 3.7–5.3)
POTASSIUM SERPL-SCNC: 3.9 MMOL/L (ref 3.7–5.3)
PROTHROMBIN TIME: 11.6 SEC (ref 9.1–12.3)
RBC # BLD: 3.49 M/UL (ref 3.95–5.11)
SAMPLE SITE: ABNORMAL
SODIUM BLD-SCNC: 144 MMOL/L (ref 135–144)
TCO2 (CALC), ART: ABNORMAL MMOL/L (ref 22–29)
TRANSFUSION STATUS: NORMAL
UNIT DIVISION: 0
UNIT NUMBER: NORMAL
WBC # BLD: 10 K/UL (ref 3.5–11.3)

## 2021-12-08 PROCEDURE — 93325 DOPPLER ECHO COLOR FLOW MAPG: CPT

## 2021-12-08 PROCEDURE — 97116 GAIT TRAINING THERAPY: CPT

## 2021-12-08 PROCEDURE — 2580000003 HC RX 258: Performed by: NURSE PRACTITIONER

## 2021-12-08 PROCEDURE — 93321 DOPPLER ECHO F-UP/LMTD STD: CPT

## 2021-12-08 PROCEDURE — 85055 RETICULATED PLATELET ASSAY: CPT

## 2021-12-08 PROCEDURE — 94640 AIRWAY INHALATION TREATMENT: CPT

## 2021-12-08 PROCEDURE — 85027 COMPLETE CBC AUTOMATED: CPT

## 2021-12-08 PROCEDURE — APPSS60 APP SPLIT SHARED TIME 46-60 MINUTES: Performed by: NURSE PRACTITIONER

## 2021-12-08 PROCEDURE — 6360000002 HC RX W HCPCS: Performed by: NURSE PRACTITIONER

## 2021-12-08 PROCEDURE — 71045 X-RAY EXAM CHEST 1 VIEW: CPT

## 2021-12-08 PROCEDURE — 93308 TTE F-UP OR LMTD: CPT

## 2021-12-08 PROCEDURE — 93010 ELECTROCARDIOGRAM REPORT: CPT | Performed by: INTERNAL MEDICINE

## 2021-12-08 PROCEDURE — 80051 ELECTROLYTE PANEL: CPT

## 2021-12-08 PROCEDURE — 6370000000 HC RX 637 (ALT 250 FOR IP): Performed by: NURSE PRACTITIONER

## 2021-12-08 PROCEDURE — 82803 BLOOD GASES ANY COMBINATION: CPT

## 2021-12-08 PROCEDURE — C9113 INJ PANTOPRAZOLE SODIUM, VIA: HCPCS | Performed by: NURSE PRACTITIONER

## 2021-12-08 PROCEDURE — 97163 PT EVAL HIGH COMPLEX 45 MIN: CPT

## 2021-12-08 PROCEDURE — 84132 ASSAY OF SERUM POTASSIUM: CPT

## 2021-12-08 PROCEDURE — 83605 ASSAY OF LACTIC ACID: CPT

## 2021-12-08 PROCEDURE — 2500000003 HC RX 250 WO HCPCS: Performed by: NURSE PRACTITIONER

## 2021-12-08 PROCEDURE — 85018 HEMOGLOBIN: CPT

## 2021-12-08 PROCEDURE — 80048 BASIC METABOLIC PNL TOTAL CA: CPT

## 2021-12-08 PROCEDURE — 84520 ASSAY OF UREA NITROGEN: CPT

## 2021-12-08 PROCEDURE — 85014 HEMATOCRIT: CPT

## 2021-12-08 PROCEDURE — 99024 POSTOP FOLLOW-UP VISIT: CPT | Performed by: NURSE PRACTITIONER

## 2021-12-08 PROCEDURE — P9041 ALBUMIN (HUMAN),5%, 50ML: HCPCS | Performed by: NURSE PRACTITIONER

## 2021-12-08 PROCEDURE — 85610 PROTHROMBIN TIME: CPT

## 2021-12-08 PROCEDURE — 2100000001 HC CVICU R&B

## 2021-12-08 PROCEDURE — 82565 ASSAY OF CREATININE: CPT

## 2021-12-08 PROCEDURE — 82330 ASSAY OF CALCIUM: CPT

## 2021-12-08 PROCEDURE — 2700000000 HC OXYGEN THERAPY PER DAY

## 2021-12-08 PROCEDURE — 37799 UNLISTED PX VASCULAR SURGERY: CPT

## 2021-12-08 PROCEDURE — 94761 N-INVAS EAR/PLS OXIMETRY MLT: CPT

## 2021-12-08 PROCEDURE — 97530 THERAPEUTIC ACTIVITIES: CPT

## 2021-12-08 PROCEDURE — 83735 ASSAY OF MAGNESIUM: CPT

## 2021-12-08 RX ORDER — DOCUSATE SODIUM 100 MG/1
100 CAPSULE, LIQUID FILLED ORAL 2 TIMES DAILY
Status: DISCONTINUED | OUTPATIENT
Start: 2021-12-08 | End: 2021-12-14 | Stop reason: HOSPADM

## 2021-12-08 RX ORDER — SENNA PLUS 8.6 MG/1
1 TABLET ORAL NIGHTLY
Status: DISCONTINUED | OUTPATIENT
Start: 2021-12-08 | End: 2021-12-14 | Stop reason: HOSPADM

## 2021-12-08 RX ORDER — ATORVASTATIN CALCIUM 10 MG/1
10 TABLET, FILM COATED ORAL NIGHTLY
Status: DISCONTINUED | OUTPATIENT
Start: 2021-12-08 | End: 2021-12-14 | Stop reason: HOSPADM

## 2021-12-08 RX ORDER — WARFARIN SODIUM 7.5 MG/1
7.5 TABLET ORAL
Status: COMPLETED | OUTPATIENT
Start: 2021-12-08 | End: 2021-12-08

## 2021-12-08 RX ADMIN — MUPIROCIN: 20 OINTMENT TOPICAL at 11:34

## 2021-12-08 RX ADMIN — CLONAZEPAM 1 MG: 1 TABLET ORAL at 19:52

## 2021-12-08 RX ADMIN — WARFARIN SODIUM 7.5 MG: 7.5 TABLET ORAL at 19:53

## 2021-12-08 RX ADMIN — DEXTROSE MONOHYDRATE 2000 MG: 50 INJECTION, SOLUTION INTRAVENOUS at 04:17

## 2021-12-08 RX ADMIN — DOCUSATE SODIUM 100 MG: 100 CAPSULE ORAL at 19:52

## 2021-12-08 RX ADMIN — ENOXAPARIN SODIUM 70 MG: 100 INJECTION SUBCUTANEOUS at 19:54

## 2021-12-08 RX ADMIN — POLYETHYLENE GLYCOL 3350 17 G: 17 POWDER, FOR SOLUTION ORAL at 11:35

## 2021-12-08 RX ADMIN — DOCUSATE SODIUM 100 MG: 100 CAPSULE ORAL at 11:33

## 2021-12-08 RX ADMIN — Medication 81 MG: at 11:33

## 2021-12-08 RX ADMIN — ALBUMIN (HUMAN) 25 G: 12.5 INJECTION, SOLUTION INTRAVENOUS at 13:11

## 2021-12-08 RX ADMIN — ATORVASTATIN CALCIUM 10 MG: 10 TABLET, FILM COATED ORAL at 19:52

## 2021-12-08 RX ADMIN — ENOXAPARIN SODIUM 70 MG: 100 INJECTION SUBCUTANEOUS at 11:33

## 2021-12-08 RX ADMIN — IPRATROPIUM BROMIDE AND ALBUTEROL SULFATE 1 AMPULE: .5; 2.5 SOLUTION RESPIRATORY (INHALATION) at 07:45

## 2021-12-08 RX ADMIN — POTASSIUM CHLORIDE 20 MEQ: 29.8 INJECTION INTRAVENOUS at 06:16

## 2021-12-08 RX ADMIN — POTASSIUM CHLORIDE 20 MEQ: 29.8 INJECTION INTRAVENOUS at 12:31

## 2021-12-08 RX ADMIN — POTASSIUM CHLORIDE 20 MEQ: 29.8 INJECTION INTRAVENOUS at 09:59

## 2021-12-08 RX ADMIN — SODIUM BICARBONATE 50 MEQ: 84 INJECTION, SOLUTION INTRAVENOUS at 08:40

## 2021-12-08 RX ADMIN — DEXTROSE MONOHYDRATE 2000 MG: 50 INJECTION, SOLUTION INTRAVENOUS at 14:53

## 2021-12-08 RX ADMIN — FENTANYL CITRATE 25 MCG: 50 INJECTION, SOLUTION INTRAMUSCULAR; INTRAVENOUS at 08:56

## 2021-12-08 RX ADMIN — SODIUM BICARBONATE 50 MEQ: 84 INJECTION, SOLUTION INTRAVENOUS at 05:29

## 2021-12-08 RX ADMIN — OXYCODONE HYDROCHLORIDE AND ACETAMINOPHEN 1 TABLET: 5; 325 TABLET ORAL at 14:52

## 2021-12-08 RX ADMIN — SODIUM CHLORIDE, PRESERVATIVE FREE 10 ML: 5 INJECTION INTRAVENOUS at 11:34

## 2021-12-08 RX ADMIN — VANCOMYCIN HYDROCHLORIDE 1000 MG: 1 INJECTION, SOLUTION INTRAVENOUS at 22:03

## 2021-12-08 RX ADMIN — IPRATROPIUM BROMIDE AND ALBUTEROL SULFATE 1 AMPULE: .5; 2.5 SOLUTION RESPIRATORY (INHALATION) at 20:53

## 2021-12-08 RX ADMIN — PANTOPRAZOLE SODIUM 40 MG: 40 INJECTION, POWDER, FOR SOLUTION INTRAVENOUS at 11:34

## 2021-12-08 RX ADMIN — VANCOMYCIN HYDROCHLORIDE 1000 MG: 1 INJECTION, SOLUTION INTRAVENOUS at 12:31

## 2021-12-08 RX ADMIN — SODIUM CHLORIDE, PRESERVATIVE FREE 10 ML: 5 INJECTION INTRAVENOUS at 11:00

## 2021-12-08 RX ADMIN — BACLOFEN 20 MG: 10 TABLET ORAL at 19:52

## 2021-12-08 RX ADMIN — DEXTROSE MONOHYDRATE 2000 MG: 50 INJECTION, SOLUTION INTRAVENOUS at 19:53

## 2021-12-08 RX ADMIN — SENNOSIDES 8.6 MG: 8.6 TABLET, COATED ORAL at 19:53

## 2021-12-08 ASSESSMENT — PAIN SCALES - GENERAL
PAINLEVEL_OUTOF10: 6
PAINLEVEL_OUTOF10: 7

## 2021-12-08 ASSESSMENT — PULMONARY FUNCTION TESTS
PIF_VALUE: 13
PIF_VALUE: 14
PIF_VALUE: 13
PIF_VALUE: 1
PIF_VALUE: 15

## 2021-12-08 ASSESSMENT — PAIN DESCRIPTION - LOCATION: LOCATION: CHEST

## 2021-12-08 ASSESSMENT — PAIN DESCRIPTION - PAIN TYPE: TYPE: ACUTE PAIN;SURGICAL PAIN

## 2021-12-08 ASSESSMENT — PAIN DESCRIPTION - DESCRIPTORS: DESCRIPTORS: DISCOMFORT

## 2021-12-08 ASSESSMENT — PAIN - FUNCTIONAL ASSESSMENT: PAIN_FUNCTIONAL_ASSESSMENT: PREVENTS OR INTERFERES SOME ACTIVE ACTIVITIES AND ADLS

## 2021-12-08 NOTE — PROGRESS NOTES
Lorin NP with CT surgery updated on pt's BP and volume status.  No new orders at present time Detail Level: Zone Modify Regimen: Doxycycline for flares Continue Regimen: Metronidazole twice daily around periorbital, nose, perioral

## 2021-12-08 NOTE — CONSULTS
Attestation signed by      Attending Physician Statement:    I have discussed the care of  Elvin Das , including pertinent history and exam findings, with the Cardiology fellow/resident. I have seen and examined the patient and the key elements of all parts of the encounter have been performed by me. I agree with the assessment, plan and orders as documented by the fellow/resident, after I modified exam findings and plan of treatments, and the final version is my approved version of the assessment. Additional Comments:   Extubated this morning. Doing well. Pressor requirements coming down. Coumadin already started by CT surgery. Can discontinue ASA and continue plavix. Increase incentive spirometry. [facility], [address]  [facility]   Consultation Note               Today's Date: 12/8/2021  Patient Name: Elvin Das  Date of admission: 12/7/2021  5:16 AM  Patient's age: 59 y.o., 1960  Admission Dx: Severe mitral regurgitation [I34.0]    Reason for Consult:      Requesting Physician: Katiuska Robbins MD    CHIEF COMPLAINT:  No chief complaint on file. History Obtained From:  Patient and EMR    HISTORY OF PRESENT ILLNESS:      The patient is a 59 y.o. with hx of Atrial fib/flutter, severe MR was admitted for elective mitral valve replacement.  She's s/p mitral valve replacement + maze + CLARIBEL POD1        Past Medical History:   has a past medical history of Arm pain, Atrial fibrillation (HCC), Atrial fibrillation status post cardioversion [facility]), Atrial flutter ([facility] [address] Cancer ([facility] [address] Carotid artery stenosis, Cerebral artery occlusion with cerebral infarction [facility]), Chest pain, CHF (congestive heart failure) ([facility] [address] Chipped tooth, COPD (chronic obstructive pulmonary disease) ([facility] [address] Depression, Former tobacco use, H/O: CVA (cerebrovascular accident), Heart palpitations, History of lateral epicondylitis of right elbow, Hx of blood clots, Hyperlipidemia, Hyperparathyroidism ([facility] [address] Hypertensive chronic kidney disease with stage 1 through stage 4 chronic kidney disease, or unspecified chronic kidney disease, Long term current use of anticoagulant, Mitral regurgitation, Mitral valve insufficiency, Mitral valve regurgitation, Muscle spasm, Nerve pain, Osteoporosis, Personal history of other medical treatment, Radial tunnel syndrome, Restless legs, SOB (shortness of breath), Tennis elbow, Under care of team, Under care of team, Under care of team, Under care of team, and Wears glasses. Past Surgical History:   has a past surgical history that includes  section; Wrist ganglion excision (Right); Cardioversion (2016); Cardioversion (2019); Colonoscopy; ablation of dysrhythmic focus (2020); transesophageal echocardiogram (10/05/2021); Cardiac catheterization (2021); Endoscopy, colon, diagnostic; eye surgery; skin biopsy; and [place] tooth extraction. Home Medications:    Prior to Admission medications    Medication Sig Start Date End Date Taking? Authorizing Provider   enoxaparin (LOVENOX) 120 MG/0.8ML injection Inject 0.45 mLs into the skin every 12 hours for 7 days 21 Yes SARAH Delarosa - CNP   clonazePAM (KLONOPIN) 1 MG tablet Take 1 po qhs 21 Yes Maribel Avila MD   alendronate (FOSAMAX) 70 MG tablet Take 70 mg by mouth every 7 days Takes on Tuesday.    Yes Historical Provider, MD   gabapentin (NEURONTIN) 800 MG tablet TAKE ONE TABLET BY MOUTH EVERY NIGHT AT BEDTIME 21 Yes Maribel Avila MD   fluticasone [facility]) 50 MCG/ACT nasal spray 1 spray by Each Nostril route daily   Yes Historical Provider, MD   Calcium Carb-Cholecalciferol (CALCIUM 600+D) 600-800 MG-UNIT TABS Take 1 tablet by mouth 2 times daily    Yes Historical Provider, MD   furosemide (LASIX) 40 MG tablet Take 80 mg by mouth 2 times daily    Yes Historical Provider, MD   atorvastatin (LIPITOR) 10 MG tablet Take 10 mg by mouth nightly    Yes Historical Provider, MD   Potassium Chloride Natalie CR (KLOR-CON M20 PO) Take 40 mEq by mouth 2 times daily    Yes Historical Provider, MD   Fluticasone-Salmeterol (ADVAIR HFA IN) Inhale 2 puffs into the lungs 2 times daily    Yes Historical Provider, MD   baclofen (LIORESAL) 20 MG tablet Take 20 mg by mouth nightly    Yes Historical Provider, MD   desvenlafaxine succinate (PRISTIQ) 100 MG TB24 Take 100 mg by mouth daily   Yes Historical Provider, MD   rOPINIRole (REQUIP) 1 MG tablet TAKE 1 TABLET BY MOUTH UPON RISING, 3 TABLET BY MOUTH  AT 6 PM AND 3 TABLETS BY  MOUTH AT BEDTIME 3/15/21 12/1/21  Mario Poon MD   Warfarin Sodium (COUMADIN PO) Take 2.5 mg by mouth Daily with supper 2.5mg 3x week  1.25mg 4x week    Historical Provider, MD      Current Facility-Administered Medications: influenza quadrivalent split vaccine (FLUZONE;FLUARIX;FLULAVAL;AFLURIA) injection 0.5 mL, 0.5 mL, IntraMUSCular, Prior to discharge  [Held by provider] baclofen (LIORESAL) tablet 20 mg, 20 mg, Oral, Nightly  [Held by provider] clonazePAM (KLONOPIN) tablet 1 mg, 1 mg, Oral, Nightly  sodium chloride flush 0.9 % injection 10 mL, 10 mL, IntraVENous, 2 times per day  sodium chloride flush 0.9 % injection 10 mL, 10 mL, IntraVENous, PRN  0.9 % sodium chloride infusion, 25 mL, IntraVENous, PRN  ondansetron (ZOFRAN) injection 4 mg, 4 mg, IntraVENous, Q8H PRN  aspirin EC tablet 81 mg, 81 mg, Oral, Daily  clopidogrel (PLAVIX) tablet 75 mg, 75 mg, Oral, Daily  acetaminophen (TYLENOL) tablet 650 mg, 650 mg, Oral, Q4H PRN  oxyCODONE-acetaminophen (PERCOCET) 5-325 MG per tablet 1 tablet, 1 tablet, Oral, Q4H PRN **OR** oxyCODONE-acetaminophen (PERCOCET) 5-325 MG per tablet 2 tablet, 2 tablet, Oral, Q4H PRN  fentaNYL (SUBLIMAZE) injection 25 mcg, 25 mcg, IntraVENous, Q1H PRN **OR** fentaNYL (SUBLIMAZE) injection 50 mcg, 50 mcg, IntraVENous, Q1H PRN  amiodarone (CORDARONE) tablet 200 mg, 200 mg, Oral, TID  hydrALAZINE (APRESOLINE) injection 5 mg, 5 mg, IntraVENous, Q5 Min PRN  metoprolol (LOPRESSOR) injection 2.5 mg, 2.5 mg, IntraVENous, Q10 Min PRN  mupirocin (BACTROBAN) 2 % ointment, , Nasal, BID  propofol injection, 10 mcg/kg/min, IntraVENous, Continuous  sodium bicarbonate 8.4 % injection 50 mEq, 50 mEq, IntraVENous, Q30 Min PRN  diphenhydrAMINE (BENADRYL) tablet 25 mg, 25 mg, Oral, Nightly PRN  polyethylene glycol (GLYCOLAX) packet 17 g, 17 g, Oral, Daily  bisacodyl (DULCOLAX) EC tablet 5 mg, 5 mg, Oral, Daily PRN  fleet rectal enema 1 enema, 1 enema, Rectal, Daily PRN  metoprolol tartrate (LOPRESSOR) tablet 25 mg, 25 mg, Oral, BID  atorvastatin (LIPITOR) tablet 80 mg, 80 mg, Oral, Nightly  pantoprazole (PROTONIX) tablet 40 mg, 40 mg, Oral, Daily  pantoprazole (PROTONIX) injection 40 mg, 40 mg, IntraVENous, Daily **AND** sodium chloride (PF) 0.9 % injection 10 mL, 10 mL, IntraVENous, Daily  ceFAZolin (ANCEF) 2000 mg in dextrose 5 % 50 mL IVPB, 2,000 mg, IntraVENous, Q8H  vancomycin (VANCOCIN) 1000 mg in dextrose 5% 200 mL IVPB, 1,000 mg, IntraVENous, Q12H  calcium chloride 1,000 mg in sodium chloride 0.9 % 100 mL IVPB, 1,000 mg, IntraVENous, PRN  magnesium sulfate 1000 mg in dextrose 5% 100 mL IVPB, 1,000 mg, IntraVENous, PRN  potassium chloride 20 mEq/50 mL IVPB (Central Line), 20 mEq, IntraVENous, PRN  ipratropium-albuterol (DUONEB) nebulizer solution 1 ampule, 1 ampule, Inhalation, Q4H WA  albumin human 5 % IV solution 25 g, 25 g, IntraVENous, PRN  norepinephrine (LEVOPHED) 16 mg in sodium chloride 0.9 % 250 mL infusion, 0.2 mcg/kg/min, IntraVENous, Continuous PRN  insulin regular (HUMULIN R;NOVOLIN R) 100 Units in sodium chloride 0.9 % 100 mL infusion, 1 Units/hr, IntraVENous, Continuous  insulin glargine (LANTUS) injection vial 10 Units, 0.15 Units/kg, SubCUTAneous, Nightly  glucose (GLUTOSE) 40 % oral gel 15 g, 15 g, Oral, PRN  dextrose 50 % IV solution, 12.5 g, IntraVENous, PRN  glucagon injection 1 mg, 1 mg, IntraMUSCular, PRN  dextrose 5 % solution, 100 mL/hr, IntraVENous, PRN  EPINEPHrine (EPINEPHrine HCL) 5 mg in dextrose 5 % 250 mL infusion, 0.01-0.07 mcg/kg/min, IntraVENous, Continuous PRN      Allergies:  Patient has no known allergies. Social History:   reports that she has quit smoking. She has never used smokeless tobacco. She reports that she does not drink alcohol and does not use drugs. Family History: family history includes Cancer in her mother and sister; No Known Problems in her father; Peripheral Vascular Disease in her sister. No h/o sudden cardiac death. for premature CAD      REVIEW OF SYSTEMS:    Constitutional: there has been no unanticipated weight loss. Eyes: No visual changes or diplopia. ENT: No Headaches  Cardiovascular: see above  Respiratory: No previous pulmonary problems, No cough  Gastrointestinal: No abdominal pain. No change in bowel or bladder habits. Genitourinary: No dysuria, trouble voiding, or hematuria. Musculoskeletal:  No gait disturbance, No weakness or joint complaints. Integumentary: No rash or pruritis. Neurological: No headache, diplopia      PHYSICAL EXAM:      BP (!) 112/52   Pulse 80   Temp 99.1 °F (37.3 °C) (Oral)   Resp 15   Ht 5' 4\" (1.626 m)   Wt 150 lb 12.7 oz (68.4 kg)   SpO2 99%   BMI 25.88 kg/m²    Constitutional and General Appearance: Alert, no distress  Respiratory:  No increased work of breathing. Clear to auscultation bilaterally. No wheeze or crackles. Cardiovascular:  Normal S1 and S2.   Jugular venous pulsation Normal  Abdomen:   Soft  No tenderness  Extremities:  No lower extremity edema  Neurologic:  Alert and oriented.   Moves all extremities well      DATA:    Diagnostics:    Labs:     CBC:   Recent Labs     12/07/21  2201 12/08/21  0424   WBC 10.2 10.0   HGB 10.1* 10.2*   HCT 32.2* 33.9*   PLT See Reflexed IPF Result See Reflexed IPF Result     BMP:   Recent Labs     12/07/21  1420 12/07/21  1420 12/07/21  1816 12/07/21 2201 12/08/21  0319 12/08/21  0424     --   --   --   --  144   K 3.2*  --    < > 4.6  --  3.9   CO2 23  --   --   --   --  19*   BUN 15  --   --   --   --  12   CREATININE 0.81   < >  --   --  0.87 0.88   LABGLOM >60   < >  --   --  >60 >60   GLUCOSE 126*  --   --   --   --  128*    < > = values in this interval not displayed. BNP: No results for input(s): BNP in the last 72 hours. PT/INR:   Recent Labs     12/07/21  1420 12/08/21 0424   PROTIME 12.5* 11.6   INR 1.2 1.1     APTT:  Recent Labs     12/07/21 1420   APTT 27.6     CARDIAC ENZYMES:  No results for input(s): TROPHS in the last 72 hours. No results for input(s): CKTOTAL, CKMB, CKMBINDEX, TROPONINI in the last 72 hours. Invalid input(s):  TROPONINT  No results for input(s): TROPONINT in the last 72 hours. FASTING LIPID PANEL:  Lab Results   Component Value Date    HDL 52 01/25/2016    TRIG 92 01/25/2016     LIVER PROFILE:No results for input(s): AST, ALT, LABALBU in the last 72 hours. IMPRESSION:    Severe MR s/p  mitral valve replacement with Magna Ease + maze + CLARIBEL  Moderate tp severe TR  Paroxsymal afib, hx of cardivoersion, failed cryoablation with Dr Catalina Yan on 6/22/2020  LAURA  CKD  Hx of CVA        RECOMMENDATIONS:  Continue  plavix  Continue statin  Continue amio 200 mg TID per ct surgery recs  Continue lopressor as tolerated by HR and BP  Incentive spirometry  Good glycemic index  Post op management per CT surgery recs        Thank you for allowing us to participate in Sherrivivian Hernandez's care. Will follow with you.       Electronically signed on 12/08/21 at 6:39 AM by:    Bereket Francois MD, MD   Fellow, [address]

## 2021-12-08 NOTE — PROGRESS NOTES
Physical Therapy    Facility/Department: [facility]  Initial Assessment    NAME: Khoa Sanches  : 1960  MRN: [id number]    Date of Service: 2021  Copied from chart:  POD #1  MITRAL VALVE REPLACE, MAZE CLARIBEL (N/A )  Discharge Recommendations:  Patient would benefit from continued therapy after discharge   PT Equipment Recommendations  Equipment Needed: No (will CTA as the pt progresses.)    Assessment   Body structures, Functions, Activity limitations: Decreased functional mobility ; Decreased posture; Decreased endurance; Decreased ROM; Decreased strength; Decreased balance; Decreased safe awareness  Assessment: Pt with mobility deficits requiring min-A to perform bed mobility, CGA to perform sit<>stand and stand<>pivot transfer with no device. Pt limited this date secondary to decreased endurance, decreased bilateral LE strength, and decreased trunk control. Pt would benefit from additional therapy upon discharge to assist in return to prior level of functional independence. Prognosis: Good  Decision Making: High Complexity  PT Education: Transfer Training; Goals; PT Role; Functional Mobility Training; General Safety; Plan of Care; Gait Training; Precautions  Patient Education: Pt was educated on sternal precautions  REQUIRES PT FOLLOW UP: Yes  Activity Tolerance  Activity Tolerance: Patient limited by endurance; Patient limited by fatigue       Patient Diagnosis(es): There were no encounter diagnoses.      has a past medical history of Arm pain, Atrial fibrillation (HCC), Atrial fibrillation status post cardioversion [facility]), Atrial flutter ([facility] [address] [facility] Carotid artery stenosis, Cerebral artery occlusion with cerebral infarction [facility]), Chest pain, CHF (congestive heart failure) ([facility] [address] Chipped tooth, COPD (chronic obstructive pulmonary disease) ([facility] [address] Depression, Former tobacco use, H/O: CVA (cerebrovascular accident), Heart palpitations, History of lateral epicondylitis of right elbow, Hx of blood clots, Hyperlipidemia, Hyperparathyroidism ([facility] [address] Hypertensive chronic kidney disease with stage 1 through stage 4 chronic kidney disease, or unspecified chronic kidney disease, Long term current use of anticoagulant, Mitral regurgitation, Mitral valve insufficiency, Mitral valve regurgitation, Muscle spasm, Nerve pain, Osteoporosis, Personal history of other medical treatment, Radial tunnel syndrome, Restless legs, SOB (shortness of breath), Tennis elbow, Under care of team, Under care of team, Under care of team, Under care of team, and Wears glasses. has a past surgical history that includes  section; Wrist ganglion excision (Right); Cardioversion (2016); Cardioversion (2019); Colonoscopy; ablation of dysrhythmic focus (2020); transesophageal echocardiogram (10/05/2021); Cardiac catheterization (2021); Endoscopy, colon, diagnostic; eye surgery; skin biopsy; [place] tooth extraction; and Mitral valve repair (N/A, 2021). Restrictions  Restrictions/Precautions  Restrictions/Precautions: Fall Risk  Required Braces or Orthoses?: Yes  Required Braces or Orthoses  Other: Heart Hugger Brace  Position Activity Restriction  Sternal Precautions: 5# Lifting Restrictions  Other position/activity restrictions: ambulate pt, up in a chair for meals, chest tubes. Vision/Hearing  Vision: Impaired  Vision Exceptions: Wears glasses at all times  Hearing: Within functional limits     Subjective  General  Patient assessed for rehabilitation services?: Yes  Response To Previous Treatment: Not applicable  Family / Caregiver Present: Yes ()  Follows Commands: Within Functional Limits  Subjective  Subjective: Pt supine in bed and slightly lethargic but agreeable to therapy. RN agreeable to therapy. Pt pleasant and cooperative throughout today's session.   Pain Screening  Patient Currently in Pain: Yes  Pain Assessment  Pain Assessment: 0-10  Pain Level: 6  Pain Type: Acute pain; Surgical mobility performed with the [address] elevated ~25 degrees without use of handrails. Significantly increased time to perform line management. Transfers  Sit to Stand: Contact guard assistance  Stand to sit: Contact guard assistance  Stand Pivot Transfers: Contact guard assistance  Comment: Increased time/effort to perform. Ambulation  Ambulation?: No  More Ambulation?: No  Stairs/Curb  Stairs?: No     Balance  Posture: Fair  Sitting - Static: Fair; +  Sitting - Dynamic: Fair  Comments: sitting balance assessed at the EOB. Other exercises  Other exercises?: Yes  Other exercises 1: Incentive spirometry x10 reps     Plan   Plan  Times per week: 6-7  Times per day: Twice a day (1-2x/day)  Current Treatment Recommendations: Strengthening, Transfer Training, Endurance Training, ROM, Balance Training, Gait Training, Functional Mobility Training, Stair training, Safety Education & Training, Home Exercise Program, Equipment Evaluation, Education, & procurement, Patient/Caregiver Education & Training  Safety Devices  Type of devices: Patient at risk for falls, Left in chair, Call light within reach, Gait belt, Nurse notified  Restraints  Initially in place: No                                                    AM-PAC Score  AM-PAC Inpatient Mobility Raw Score : 15 (12/08/21 1616)  AM-PAC Inpatient T-Scale Score : 39.45 (12/08/21 1616)  Mobility Inpatient CMS 0-100% Score: 57.7 (12/08/21 1616)  Mobility Inpatient CMS G-Code Modifier : CK (12/08/21 1616)          Goals  Short term goals  Time Frame for Short term goals: 14 visits  Short term goal 1: Pt will ambulate 300 feet with least restrictive device and supervision to increase functional independence. Short term goal 2: Pt will tolerate a 30 minute therapy session to promote increased endurance. Short term goal 3: Pt will negotiate 1 stair with no handrail and SBA to allow the pt to enter prior living arrangements.   Short term goal 4: Pt will demonstrate good- standing balance to decrease fall risk. Short term goal 5: Pt will perform sit<>stand transfer with supervision to increase functional independence.        Therapy Time   Individual Concurrent Group Co-treatment   Time In 1500         Time Out 1540         Minutes 40         Timed Code Treatment Minutes: JESSIE Lucio 20, PT

## 2021-12-08 NOTE — PLAN OF CARE
Problem: Non-Violent Restraints  Goal: Removal from restraints as soon as assessed to be safe  12/8/2021 0343 by Panfilo Vega RN  Outcome: Completed  12/7/2021 2125 by Panfilo Vega RN  Outcome: Ongoing  12/7/2021 1804 by Claudia Mullen RN  Outcome: Ongoing  12/7/2021 1803 by Claudia Mullen RN  Outcome: Ongoing  12/7/2021 1404 by Mandie Klein RCP  Outcome: Ongoing  Goal: No harm/injury to patient while restraints in use  12/8/2021 0343 by Panfilo Vega RN  Outcome: Completed  12/7/2021 2125 by Panfilo Vega RN  Outcome: Ongoing  12/7/2021 1804 by Claudia Mullen RN  Outcome: Ongoing  12/7/2021 1803 by Claudia Mullen RN  Outcome: Ongoing  12/7/2021 1404 by Mandie Klein RCP  Outcome: Ongoing  Goal: Patient's dignity will be maintained  12/8/2021 0343 by Panfilo Vega RN  Outcome: Completed  12/7/2021 2125 by Panfilo Vega RN  Outcome: Ongoing  12/7/2021 1804 by Claudia Mullen RN  Outcome: Ongoing  12/7/2021 1803 by Claudia Mullen RN  Outcome: Ongoing  12/7/2021 1404 by Mandie Klein RCP  Outcome: Ongoing

## 2021-12-08 NOTE — PROGRESS NOTES
Ryan Goldmann, NP to bedside, updated on current patient status. No new orders received at this time.

## 2021-12-08 NOTE — PROGRESS NOTES
12/01/2021    Urology: Dr. Linden Cali, last visit 10/2021    Wears glasses            Recent Labs     12/08/21  0424   INR 1.1     Recent Labs     12/07/21  1420 12/07/21  2201 12/08/21  0424   HGB 13.8 10.1* 10.2*   HCT 42.9 32.2* 33.9*    See Reflexed IPF Result See Reflexed IPF Result     Current warfarin drug-drug interactions: acetaminophen,  aspirin, enoxaparin      Date INR Dose   12/7/2021 1.2 none   12/8/2021 1.1 7.5mg     Notes:  Give warfarin 7.5mg today on 12/8/2021. Daily PT/INR while inpatient. Thank you for the consult. Will continue to follow.     Steve Chavez RPh, CACP  Clinical Pharmacist Medication Management  12/8/2021  12:32 PM

## 2021-12-08 NOTE — PROGRESS NOTES
RCP Ave to bedside. Patient able to follow commands, sedation off. RCP switched patient to CPAP mode on ventilator. Will get ABG in approx 1 hr.  VSS and recorded

## 2021-12-08 NOTE — PROGRESS NOTES
Physical Therapy        Physical Therapy Cancel Note      DATE: 2021    NAME: Marciano King  MRN: [id number]   : 1960      Patient not seen this date for Physical Therapy due to:    Patient is not appropriate for PT evaluation/treatment at this time d/t RN reports pt is on bipap and pressors. Will check back this afternoon as time permits.       Electronically signed by Dinora Ge PT on 2021 at 8:17 AM

## 2021-12-08 NOTE — PLAN OF CARE
Problem: Falls - Risk of:  Goal: Will remain free from falls  Description: Will remain free from falls  12/7/2021 2125 by Gallo Membreno RN  Outcome: Ongoing  12/7/2021 1804 by Howard Llanes RN  Outcome: Ongoing  12/7/2021 1803 by Howard Llanes RN  Outcome: Ongoing  12/7/2021 1404 by Krishna Holman RCP  Outcome: Ongoing  Goal: Absence of physical injury  Description: Absence of physical injury  12/7/2021 2125 by Gallo Membreno RN  Outcome: Ongoing  12/7/2021 1804 by Howard Llanes RN  Outcome: Ongoing  12/7/2021 1803 by Howard Llanes RN  Outcome: Ongoing  12/7/2021 1404 by Krishna Holman RCP  Outcome: Ongoing     Problem: Non-Violent Restraints  Goal: Removal from restraints as soon as assessed to be safe  12/7/2021 2125 by Gallo Membreno RN  Outcome: Ongoing  12/7/2021 1804 by Howard Llanes RN  Outcome: Ongoing  12/7/2021 1803 by Howard Llanes RN  Outcome: Ongoing  12/7/2021 1404 by Krishna Holman RCP  Outcome: Ongoing  Goal: No harm/injury to patient while restraints in use  12/7/2021 2125 by Gallo Membreno RN  Outcome: Ongoing  12/7/2021 1804 by Howard Llanes RN  Outcome: Ongoing  12/7/2021 1803 by Howard Llanes RN  Outcome: Ongoing  12/7/2021 1404 by Krishna Holman RCP  Outcome: Ongoing  Goal: Patient's dignity will be maintained  12/7/2021 2125 by Gallo Membreno RN  Outcome: Ongoing  12/7/2021 1804 by Howard Llanes RN  Outcome: Ongoing  12/7/2021 1803 by Howard Llanes RN  Outcome: Ongoing  12/7/2021 1404 by Krishna Holman RCP  Outcome: Ongoing     Problem: OXYGENATION/RESPIRATORY FUNCTION  Goal: Patient will maintain patent airway  12/7/2021 2125 by Gallo Membreno RN  Outcome: Ongoing  12/7/2021 1804 by Howard Llanes RN  Outcome: Ongoing  12/7/2021 1803 by Howard Llanes RN  Outcome: Ongoing  12/7/2021 1404 by Krishna Holman RCP  Outcome: Ongoing  Goal: Patient will achieve/maintain normal respiratory rate/effort  Description: Respiratory rate and effort will be within normal limits for the patient  12/7/2021 2125 by Leonel Sánchez RN  Outcome: Ongoing  12/7/2021 1804 by Rahul Benjamin RN  Outcome: Ongoing  12/7/2021 1803 by Rahul Benjamin RN  Outcome: Ongoing  12/7/2021 1404 by Christiano Choi RCP  Outcome: Ongoing     Problem: MECHANICAL VENTILATION  Goal: Patient will maintain patent airway  12/7/2021 2125 by Leonel Sánchez RN  Outcome: Ongoing  12/7/2021 1804 by Rahul Benjamin RN  Outcome: Ongoing  12/7/2021 1803 by Rahul Benjamin RN  Outcome: Ongoing  12/7/2021 1404 by Christiano Choi RCP  Outcome: Ongoing  Goal: Oral health is maintained or improved  12/7/2021 2125 by Leonel Sánchez RN  Outcome: Ongoing  12/7/2021 1804 by Rahul Benjamin RN  Outcome: Ongoing  12/7/2021 1803 by Rahul Benjamin RN  Outcome: Ongoing  12/7/2021 1404 by Christiano Choi RCP  Outcome: Ongoing  Goal: ET tube will be managed safely  12/7/2021 2125 by Leonel Sánchez RN  Outcome: Ongoing  12/7/2021 1804 by Rahul Benjamin RN  Outcome: Ongoing  12/7/2021 1803 by Rahul Benjamin RN  Outcome: Ongoing  12/7/2021 1404 by Christiano Choi RCP  Outcome: Ongoing  Goal: Ability to express needs and understand communication  12/7/2021 2125 by Leonel Sánchez RN  Outcome: Ongoing  12/7/2021 1804 by Rahul Benjamin RN  Outcome: Ongoing  12/7/2021 1803 by Rahul Benjamin RN  Outcome: Ongoing  12/7/2021 1404 by Christiano Choi RCP  Outcome: Ongoing  Goal: Mobility/activity is maintained at optimum level for patient  12/7/2021 2125 by Leonel Sánchez RN  Outcome: Ongoing  12/7/2021 1804 by Rahul Benjamin RN  Outcome: Ongoing  12/7/2021 1803 by Rahul Benjamin RN  Outcome: Ongoing  12/7/2021 1404 by Christiano Choi RCP  Outcome: Ongoing     Problem: SKIN INTEGRITY  Goal: Skin integrity is maintained or improved  12/7/2021 2125 by Krystian Pride RN  Outcome: Ongoing  12/7/2021 1804 by Amparo Ramirez RN  Outcome: Ongoing  12/7/2021 1803 by Amparo Ramirez RN  Outcome: Ongoing  12/7/2021 1404 by Nicolás Boudreaux RCP  Outcome: Ongoing     Problem: Discharge Planning:  Goal: Discharged to appropriate level of care  Description: Discharged to appropriate level of care  12/7/2021 2125 by Krystian Pride RN  Outcome: Ongoing  12/7/2021 1804 by Amparo Ramirez RN  Outcome: Ongoing     Problem: Cardiac Output - Decreased:  Goal: Cardiac output within specified parameters  Description: Cardiac output within specified parameters  12/7/2021 2125 by Krystian Pride RN  Outcome: Ongoing  12/7/2021 1804 by Amparo Ramirez RN  Outcome: Ongoing     Problem: Infection - Surgical Site:  Goal: Will show no infection signs and symptoms  Description: Will show no infection signs and symptoms  12/7/2021 2125 by Krystian Pride RN  Outcome: Ongoing  12/7/2021 1804 by Amparo Ramirez RN  Outcome: Ongoing     Problem: Pain - Acute:  Goal: Pain level will decrease  Description: Pain level will decrease  12/7/2021 2125 by Krystian Pride RN  Outcome: Ongoing  12/7/2021 1804 by Amparo Ramirez RN  Outcome: Ongoing     Problem: Venous Thromboembolism:  Goal: Will show no signs or symptoms of venous thromboembolism  Description: Will show no signs or symptoms of venous thromboembolism  12/7/2021 2125 by Krystian Pride RN  Outcome: Ongoing  12/7/2021 1804 by Amparo Ramirez RN  Outcome: Ongoing  Goal: Absence of signs or symptoms of impaired coagulation  Description: Absence of signs or symptoms of impaired coagulation  12/7/2021 2125 by Krystian Pride RN  Outcome: Ongoing  12/7/2021 1804 by Amparo Ramirez RN  Outcome: Ongoing     Problem: Skin Integrity:  Goal: Will show no infection signs and symptoms  Description: Will show no infection signs and symptoms  Outcome: Ongoing  Goal: Absence of new skin breakdown  Description: Absence of new skin breakdown  Outcome: Ongoing

## 2021-12-08 NOTE — PROGRESS NOTES
[facility]  Daily Progress Note    Surgeon: Dr. Fozia Montana  S/P : MITRAL VALVE REPLACE, MAZE, CLARIBEL   POD#: 1  EF: 50-55%     Subjective:  Ms. Elham Browne feels ok today with no acute complaints. Patient is on a Bipap at this time. She states that her breathing is ok and she is experiencing pain. Pain medications administered per RN as ordered. Physical Exam  Vital Signs: BP (!) 112/52   Pulse 80   Temp 99.1 °F (37.3 °C) (Oral)   Resp 15   Ht 5' 4\" (1.626 m)   Wt 150 lb 12.7 oz (68.4 kg)   SpO2 99%   BMI 25.88 kg/m²  O2 Flow Rate (L/min): 4 L/min   Admit Weight: Weight: 145 lb 8.1 oz (66 kg)   WEIGHTWeight: 150 lb 12.7 oz (68.4 kg)     General: alert and oriented to person, place and time, well-developed and well-nourished, in no acute distress. Heart: Normal S1 and S2.  Regular rhythm. No murmurs, gallops, or rubs. Pacing Wires: Yes - To be clipped prior to discharge  Lungs: clear to auscultation bilaterally and diminished breath sounds bibasilar Chest tubes: Yes, Air Leak: No  Abdomen: soft, non tender, non distended, BS hypoactive x4  Extremities: Trace edema  Wounds: clean and dry, healing appropriately. Prevena to Sternum.       Sternum: Covered  SVG sites: Covered    Scheduled Meds:    influenza virus vaccine  0.5 mL IntraMUSCular Prior to discharge    [Held by provider] baclofen  20 mg Oral Nightly    [Held by provider] clonazePAM  1 mg Oral Nightly    sodium chloride flush  10 mL IntraVENous 2 times per day    aspirin  81 mg Oral Daily    clopidogrel  75 mg Oral Daily    amiodarone  200 mg Oral TID    mupirocin   Nasal BID    polyethylene glycol  17 g Oral Daily    metoprolol tartrate  25 mg Oral BID    atorvastatin  80 mg Oral Nightly    pantoprazole  40 mg Oral Daily    pantoprazole  40 mg IntraVENous Daily    And    sodium chloride (PF)  10 mL IntraVENous Daily    ceFAZolin (ANCEF) IVPB  2,000 mg IntraVENous Q8H    vancomycin (VANCOCIN) IV  1,000 mg IntraVENous Q12H    ipratropium-albuterol  1 ampule Inhalation Q4H WA    insulin glargine  0.15 Units/kg SubCUTAneous Nightly     Continuous Infusions:    sodium chloride      propofol Stopped (12/07/21 2245)    norepinephrine 0.03 mcg/kg/min (12/08/21 0631)    insulin Stopped (12/07/21 1620)    dextrose      EPINEPHrine Stopped (12/07/21 1730)       Data:  CBC:   Recent Labs     12/07/21  1420 12/07/21 2201 12/08/21  0424   WBC 14.0* 10.2 10.0   HGB 13.8 10.1* 10.2*   HCT 42.9 32.2* 33.9*   MCV 91.7 95.3 97.1    See Reflexed IPF Result See Reflexed IPF Result     BMP:   Recent Labs     12/07/21  1420 12/07/21  1420 12/07/21  1816 12/07/21 2201 12/08/21 0319 12/08/21  0424     --   --   --   --  144   K 3.2*   < > 3.1* 4.6  --  3.9     --   --   --   --  114*   CO2 23  --   --   --   --  19*   BUN 15  --   --   --   --  12   CREATININE 0.81  --   --   --  0.87 0.88    < > = values in this interval not displayed. PT/INR:   Recent Labs     12/07/21  0845 12/07/21 1420 12/08/21 0424   PROTIME 10.4 12.5* 11.6   INR 1.0 1.2 1.1     APTT:   Recent Labs     12/07/21 1420   APTT 27.6       Chest X-Ray: Reviewed. Awaiting official report    I/O:  I/O last 3 completed shifts: In: 4920.7 [I.V.:2620.7;  Blood:650; NG/GT:50; IV Piggyback:1600]  Out: 0930 [Urine:2740; Blood:775; Chest Tube:510]    Assessment/Plan:      Diagnosis Date    Arm pain Right and Left    Atrial fibrillation (HCC)     Atrial fibrillation status post cardioversion [facility])     Atrial flutter (HCC)     Cancer (HCC)     skin    Carotid artery stenosis     Cerebral artery occlusion with cerebral infarction [facility])     Chest pain 11/2003    CHF ([facility]estive heart failure) [address] [address]     Chipped tooth 12/01/2021    Top left    COPD (chronic obstructive pulmonary disease) (HCC)     Depression     Former tobacco use     H/O: CVA (cerebrovascular accident)     1-    Heart palpitations     History of lateral epicondylitis of right elbow     And left elbow    Hx of blood clots     Hyperlipidemia     Hyperparathyroidism ([address]     Hypertensive chronic kidney disease with stage 1 through stage 4 chronic kidney disease, or unspecified chronic kidney disease     Long term current use of anticoagulant     Mitral regurgitation     Mitral valve insufficiency     Mitral valve regurgitation     Muscle spasm     Nerve pain     Osteoporosis     Personal history of other medical treatment     Chronic diastolic congestive heart failure    Radial tunnel syndrome Right radial tunnel release     Restless legs     SOB (shortness of breath) 11/08/2021    Tennis elbow Right and left    Under care of team 12/01/2021    Cardiologist: Kim Brown, last visit 8/2021    Under care of team 12/01/2021    PCP: soren Moellermonsepideh, last visit 6/2021    Under care of team 12/01/2021    Neurology: Dr. Husam Mathew, last visit 11/2021    Under care of team 12/01/2021    Urology: Dr. Sim Agustin, last visit 10/2021    Wears glasses       Neuro: Intact   Lungs: clear, diminished in the bases   HD: Patient remains on norepinephrine drip- working to wean off   Edema: trace/1+ peripheral    GI: Hypoactive bowel sounds X4   : Good urine output- zayas in place    Beta-Blocker: yes- with holding parameters.  Continues on vasopressor  ASA: yes  Plavix: no  GI: yes  Statin: yes  Coumadin: yes  ACE-I: no  EF: 50-55%       Oxygen as needed to maintain SpO2 > 92%  o Wean as tolerted  o Use Bipap as needed  o Re-check ABG this morning   Chest x-ray daily   Keep Chest Tubes to wall suction   Work on weaning Norepinephrine off as tolerated  US Airways spirometry, acapella and ambulation    Will restart Coumadin- dosing per pharmacy  o INR goal 2.5-3.5  o Will bridge with lovenox   Replace electrolytes as needed per sliding scale and recheck per policy   Case Management consult for discharge planning      The above recommendations including medications and orders were discussed and agreed upon with Dr. Vince Horton, the attending on service for the cardiothoracic surgery group today. Electronically signed by SARAH Veras CNP on 12/8/2021 at 6:46 AM    On this date 12/8/2021 I have spent 48 minutes reviewing previous notes, test results and face to face with the patient discussing the diagnosis and importance of compliance with the treatment plan as well as documenting on the day of the visit. At least 50% of the time documented was spent with the patient to provide counseling and/or coordination of care. This note was created with the assistance of a speech-recognition program.  Although the intention is to generate a document that actually reflects the content of the visit, no guarantees can be provided that every mistake has been identified and corrected by editing. Note was updated later by me after  physical examination and  completion of the assessment.

## 2021-12-08 NOTE — ANESTHESIA POSTPROCEDURE EVALUATION
Department of Anesthesiology  Postprocedure Note    Patient: Finesse Malik  MRN: [id number]  YOB: 1960  Date of evaluation: 12/8/2021  Time:  7:00 AM     Procedure Summary     Date: 12/07/21 Room / Location: [address]    Anesthesia Start: 0912 Anesthesia Stop: 7521    Procedure: MITRAL VALVE REPLACE, MAZE, CLARIBEL (N/A ) Diagnosis: (MITRAL VALVE REGURGE)    Surgeons: Jose Christine MD Responsible Provider: Regla Alcaraz MD    Anesthesia Type: general ASA Status: 4          Anesthesia Type: general    Ailyn Phase I: Ailyn Score: 3    Ailyn Phase II:      Last vitals: Reviewed and per EMR flowsheets. POST-OP ANESTHESIA NOTE       BP (!) 112/52   Pulse 80   Temp 99.1 °F (37.3 °C) (Oral)   Resp 15   Ht 5' 4\" (1.626 m)   Wt 150 lb 12.7 oz (68.4 kg)   SpO2 99%   BMI 25.88 kg/m²    Pain Assessment: CPOT  Pain Level:  (non-verbal pain indicators)         Anesthesia Post Evaluation    Patient location during evaluation: ICU  Patient participation: complete - patient participated  Level of consciousness: awake  Pain scale: CPOT. Airway patency: patent  Nausea & Vomiting: no vomiting and no nausea  Complications: no  Cardiovascular status: hemodynamically stable  Respiratory status: acceptable  Hydration status: stable     Patient was successfully extubated earlier this morning. No apparent anesthesia related problems.

## 2021-12-09 ENCOUNTER — APPOINTMENT (OUTPATIENT)
Dept: GENERAL RADIOLOGY | Age: 61
DRG: 220 | End: 2021-12-09
Attending: THORACIC SURGERY (CARDIOTHORACIC VASCULAR SURGERY)
Payer: MEDICARE

## 2021-12-09 LAB
ANION GAP SERPL CALCULATED.3IONS-SCNC: 9 MMOL/L (ref 9–17)
BUN BLDV-MCNC: 7 MG/DL (ref 8–23)
BUN/CREAT BLD: ABNORMAL (ref 9–20)
CALCIUM SERPL-MCNC: 8 MG/DL (ref 8.6–10.4)
CHLORIDE BLD-SCNC: 110 MMOL/L (ref 98–107)
CO2: 23 MMOL/L (ref 20–31)
CREAT SERPL-MCNC: 0.62 MG/DL (ref 0.5–0.9)
GFR AFRICAN AMERICAN: >60 ML/MIN
GFR NON-AFRICAN AMERICAN: >60 ML/MIN
GFR SERPL CREATININE-BSD FRML MDRD: ABNORMAL ML/MIN/{1.73_M2}
GFR SERPL CREATININE-BSD FRML MDRD: ABNORMAL ML/MIN/{1.73_M2}
GLUCOSE BLD-MCNC: 103 MG/DL (ref 65–105)
GLUCOSE BLD-MCNC: 112 MG/DL (ref 70–99)
GLUCOSE BLD-MCNC: 124 MG/DL (ref 65–105)
GLUCOSE BLD-MCNC: 97 MG/DL (ref 65–105)
GLUCOSE BLD-MCNC: 98 MG/DL (ref 65–105)
HCT VFR BLD CALC: 33.9 % (ref 36.3–47.1)
HEMOGLOBIN: 10.4 G/DL (ref 11.9–15.1)
INR BLD: 1.1
MAGNESIUM: 2 MG/DL (ref 1.6–2.6)
MCH RBC QN AUTO: 29.8 PG (ref 25.2–33.5)
MCHC RBC AUTO-ENTMCNC: 30.7 G/DL (ref 28.4–34.8)
MCV RBC AUTO: 97.1 FL (ref 82.6–102.9)
NRBC AUTOMATED: 0 PER 100 WBC
PDW BLD-RTO: 15.9 % (ref 11.8–14.4)
PLATELET # BLD: ABNORMAL K/UL (ref 138–453)
PLATELET, FLUORESCENCE: 129 K/UL (ref 138–453)
PLATELET, IMMATURE FRACTION: 4.5 % (ref 1.1–10.3)
PMV BLD AUTO: ABNORMAL FL (ref 8.1–13.5)
POTASSIUM SERPL-SCNC: 3.1 MMOL/L (ref 3.7–5.3)
POTASSIUM SERPL-SCNC: 3.6 MMOL/L (ref 3.7–5.3)
POTASSIUM SERPL-SCNC: 3.8 MMOL/L (ref 3.7–5.3)
PROTHROMBIN TIME: 12 SEC (ref 9.1–12.3)
RBC # BLD: 3.49 M/UL (ref 3.95–5.11)
SODIUM BLD-SCNC: 142 MMOL/L (ref 135–144)
WBC # BLD: 13.2 K/UL (ref 3.5–11.3)

## 2021-12-09 PROCEDURE — P9041 ALBUMIN (HUMAN),5%, 50ML: HCPCS | Performed by: NURSE PRACTITIONER

## 2021-12-09 PROCEDURE — 97530 THERAPEUTIC ACTIVITIES: CPT

## 2021-12-09 PROCEDURE — 97166 OT EVAL MOD COMPLEX 45 MIN: CPT

## 2021-12-09 PROCEDURE — 94640 AIRWAY INHALATION TREATMENT: CPT

## 2021-12-09 PROCEDURE — 97110 THERAPEUTIC EXERCISES: CPT

## 2021-12-09 PROCEDURE — 71045 X-RAY EXAM CHEST 1 VIEW: CPT

## 2021-12-09 PROCEDURE — 83735 ASSAY OF MAGNESIUM: CPT

## 2021-12-09 PROCEDURE — 97116 GAIT TRAINING THERAPY: CPT

## 2021-12-09 PROCEDURE — 84132 ASSAY OF SERUM POTASSIUM: CPT

## 2021-12-09 PROCEDURE — APPSS45 APP SPLIT SHARED TIME 31-45 MINUTES: Performed by: NURSE PRACTITIONER

## 2021-12-09 PROCEDURE — 85610 PROTHROMBIN TIME: CPT

## 2021-12-09 PROCEDURE — 99024 POSTOP FOLLOW-UP VISIT: CPT | Performed by: NURSE PRACTITIONER

## 2021-12-09 PROCEDURE — 6370000000 HC RX 637 (ALT 250 FOR IP): Performed by: NURSE PRACTITIONER

## 2021-12-09 PROCEDURE — 97535 SELF CARE MNGMENT TRAINING: CPT

## 2021-12-09 PROCEDURE — 85027 COMPLETE CBC AUTOMATED: CPT

## 2021-12-09 PROCEDURE — 80048 BASIC METABOLIC PNL TOTAL CA: CPT

## 2021-12-09 PROCEDURE — 85055 RETICULATED PLATELET ASSAY: CPT

## 2021-12-09 PROCEDURE — 6360000002 HC RX W HCPCS: Performed by: NURSE PRACTITIONER

## 2021-12-09 PROCEDURE — 82947 ASSAY GLUCOSE BLOOD QUANT: CPT

## 2021-12-09 PROCEDURE — 2700000000 HC OXYGEN THERAPY PER DAY

## 2021-12-09 PROCEDURE — 6360000002 HC RX W HCPCS: Performed by: INTERNAL MEDICINE

## 2021-12-09 PROCEDURE — 2100000001 HC CVICU R&B

## 2021-12-09 PROCEDURE — 94761 N-INVAS EAR/PLS OXIMETRY MLT: CPT

## 2021-12-09 PROCEDURE — 2580000003 HC RX 258: Performed by: NURSE PRACTITIONER

## 2021-12-09 RX ORDER — POTASSIUM CHLORIDE 20 MEQ/1
40 TABLET, EXTENDED RELEASE ORAL PRN
Status: DISCONTINUED | OUTPATIENT
Start: 2021-12-09 | End: 2021-12-14 | Stop reason: HOSPADM

## 2021-12-09 RX ORDER — POTASSIUM CHLORIDE 7.45 MG/ML
10 INJECTION INTRAVENOUS PRN
Status: DISCONTINUED | OUTPATIENT
Start: 2021-12-09 | End: 2021-12-14 | Stop reason: HOSPADM

## 2021-12-09 RX ORDER — MIDODRINE HYDROCHLORIDE 5 MG/1
5 TABLET ORAL
Status: DISCONTINUED | OUTPATIENT
Start: 2021-12-09 | End: 2021-12-14 | Stop reason: HOSPADM

## 2021-12-09 RX ORDER — WARFARIN SODIUM 2.5 MG/1
2.5 TABLET ORAL
Status: DISCONTINUED | OUTPATIENT
Start: 2021-12-09 | End: 2021-12-09

## 2021-12-09 RX ADMIN — POLYETHYLENE GLYCOL 3350 17 G: 17 POWDER, FOR SOLUTION ORAL at 08:40

## 2021-12-09 RX ADMIN — Medication 81 MG: at 08:40

## 2021-12-09 RX ADMIN — ALBUMIN (HUMAN) 25 G: 12.5 INJECTION, SOLUTION INTRAVENOUS at 15:03

## 2021-12-09 RX ADMIN — MUPIROCIN: 20 OINTMENT TOPICAL at 08:40

## 2021-12-09 RX ADMIN — MIDODRINE HYDROCHLORIDE 5 MG: 5 TABLET ORAL at 18:13

## 2021-12-09 RX ADMIN — SENNOSIDES 8.6 MG: 8.6 TABLET, COATED ORAL at 20:23

## 2021-12-09 RX ADMIN — POTASSIUM CHLORIDE 20 MEQ: 29.8 INJECTION INTRAVENOUS at 04:02

## 2021-12-09 RX ADMIN — ACETAMINOPHEN 650 MG: 325 TABLET ORAL at 20:23

## 2021-12-09 RX ADMIN — IPRATROPIUM BROMIDE AND ALBUTEROL SULFATE 1 AMPULE: .5; 2.5 SOLUTION RESPIRATORY (INHALATION) at 20:52

## 2021-12-09 RX ADMIN — POTASSIUM CHLORIDE 20 MEQ: 29.8 INJECTION INTRAVENOUS at 23:43

## 2021-12-09 RX ADMIN — DOCUSATE SODIUM 100 MG: 100 CAPSULE ORAL at 08:40

## 2021-12-09 RX ADMIN — IPRATROPIUM BROMIDE AND ALBUTEROL SULFATE 1 AMPULE: .5; 2.5 SOLUTION RESPIRATORY (INHALATION) at 16:19

## 2021-12-09 RX ADMIN — DOCUSATE SODIUM 100 MG: 100 CAPSULE ORAL at 20:23

## 2021-12-09 RX ADMIN — ATORVASTATIN CALCIUM 10 MG: 10 TABLET, FILM COATED ORAL at 20:23

## 2021-12-09 RX ADMIN — DEXTROSE MONOHYDRATE 2000 MG: 50 INJECTION, SOLUTION INTRAVENOUS at 05:06

## 2021-12-09 RX ADMIN — SODIUM CHLORIDE, PRESERVATIVE FREE 10 ML: 5 INJECTION INTRAVENOUS at 08:40

## 2021-12-09 RX ADMIN — IPRATROPIUM BROMIDE AND ALBUTEROL SULFATE 1 AMPULE: .5; 2.5 SOLUTION RESPIRATORY (INHALATION) at 07:38

## 2021-12-09 RX ADMIN — POTASSIUM CHLORIDE 30 MEQ: 29.8 INJECTION INTRAVENOUS at 13:08

## 2021-12-09 RX ADMIN — ENOXAPARIN SODIUM 70 MG: 100 INJECTION SUBCUTANEOUS at 20:23

## 2021-12-09 RX ADMIN — AMIODARONE HYDROCHLORIDE 200 MG: 200 TABLET ORAL at 08:40

## 2021-12-09 RX ADMIN — CLONAZEPAM 1 MG: 1 TABLET ORAL at 20:23

## 2021-12-09 RX ADMIN — PANTOPRAZOLE SODIUM 40 MG: 40 TABLET, DELAYED RELEASE ORAL at 08:40

## 2021-12-09 RX ADMIN — IPRATROPIUM BROMIDE AND ALBUTEROL SULFATE 1 AMPULE: .5; 2.5 SOLUTION RESPIRATORY (INHALATION) at 11:41

## 2021-12-09 RX ADMIN — POTASSIUM CHLORIDE 20 MEQ: 29.8 INJECTION INTRAVENOUS at 05:38

## 2021-12-09 RX ADMIN — SODIUM CHLORIDE, PRESERVATIVE FREE 10 ML: 5 INJECTION INTRAVENOUS at 20:59

## 2021-12-09 RX ADMIN — ACETAMINOPHEN 650 MG: 325 TABLET ORAL at 10:37

## 2021-12-09 RX ADMIN — ENOXAPARIN SODIUM 70 MG: 100 INJECTION SUBCUTANEOUS at 08:40

## 2021-12-09 ASSESSMENT — PAIN DESCRIPTION - ONSET
ONSET: ON-GOING

## 2021-12-09 ASSESSMENT — PAIN DESCRIPTION - LOCATION
LOCATION: CHEST

## 2021-12-09 ASSESSMENT — PAIN SCALES - GENERAL
PAINLEVEL_OUTOF10: 6
PAINLEVEL_OUTOF10: 3
PAINLEVEL_OUTOF10: 3
PAINLEVEL_OUTOF10: 4
PAINLEVEL_OUTOF10: 6
PAINLEVEL_OUTOF10: 3
PAINLEVEL_OUTOF10: 4
PAINLEVEL_OUTOF10: 4

## 2021-12-09 ASSESSMENT — PAIN DESCRIPTION - PAIN TYPE
TYPE: ACUTE PAIN;SURGICAL PAIN

## 2021-12-09 ASSESSMENT — PAIN - FUNCTIONAL ASSESSMENT
PAIN_FUNCTIONAL_ASSESSMENT: ACTIVITIES ARE NOT PREVENTED

## 2021-12-09 ASSESSMENT — PAIN DESCRIPTION - PROGRESSION
CLINICAL_PROGRESSION: NOT CHANGED

## 2021-12-09 ASSESSMENT — PAIN DESCRIPTION - FREQUENCY
FREQUENCY: CONTINUOUS

## 2021-12-09 ASSESSMENT — PAIN DESCRIPTION - DESCRIPTORS
DESCRIPTORS: DISCOMFORT

## 2021-12-09 NOTE — PROGRESS NOTES
Physical Therapy  Facility/Department: [facility]  Daily Treatment Note  NAME: Morro Gloria  : 1960  MRN: [id number]    Date of Service: 2021    Discharge Recommendations:  Patient would benefit from continued therapy after discharge     PT Equipment Recommendations  Equipment Needed: No     Assessment     Body structures, Functions, Activity limitations: Decreased functional mobility ; Decreased posture; Decreased endurance; Decreased ROM; Decreased strength; Decreased balance; Decreased safe awareness  Assessment: Pt with mobility deficits requiring min-A to perform bed mobility, CGA to perform sit<>stand and stand<>pivot transfer with no device. Pt ambulated 300' x 1 with RW and Min. A x 1 to navigate RW. Pt limited this date secondary to decreased endurance, decreased bilateral LE strength, and decreased trunk control. Pt would benefit from additional therapy upon discharge to assist in return to prior level of functional independence. Prognosis: Good  Decision Making: High Complexity  PT Education: Transfer Training; Goals; PT Role; Functional Mobility Training; General Safety; Plan of Care; Gait Training; Precautions  Patient Education: Pt was educated on sternal precautions  REQUIRES PT FOLLOW UP: Yes  Activity Tolerance  Activity Tolerance: Patient limited by endurance; Patient limited by fatigue         Patient Diagnosis(es): There were no encounter diagnoses.      has a past medical history of Arm pain, Atrial fibrillation (HCC), Atrial fibrillation status post cardioversion [facility]), Atrial flutter ([address] Cancer ([address] Carotid artery stenosis, Cerebral artery occlusion with cerebral infarction [facility]), Chest pain, CHF (congestive heart failure) ([address] Chipped tooth, COPD (chronic obstructive pulmonary disease) ([address] Depression, Former tobacco use, H/O: CVA (cerebrovascular accident), Heart palpitations, History of lateral epicondylitis of right elbow, Hx of blood clots, Hyperlipidemia, Hyperparathyroidism ([facility] [address] Hypertensive chronic kidney disease with stage 1 through stage 4 chronic kidney disease, or unspecified chronic kidney disease, Long term current use of anticoagulant, Mitral regurgitation, Mitral valve insufficiency, Mitral valve regurgitation, Muscle spasm, Nerve pain, Osteoporosis, Personal history of other medical treatment, Radial tunnel syndrome, Restless legs, SOB (shortness of breath), Tennis elbow, Under care of team, Under care of team, Under care of team, Under care of team, and Wears glasses. has a past surgical history that includes  section; Wrist ganglion excision (Right); Cardioversion (2016); Cardioversion (2019); Colonoscopy; ablation of dysrhythmic focus (2020); transesophageal echocardiogram (10/05/2021); Cardiac catheterization (2021); Endoscopy, colon, diagnostic; eye surgery; skin biopsy; [place] tooth extraction; and Mitral valve repair (N/A, 2021). Restrictions  Restrictions/Precautions  Restrictions/Precautions: Fall Risk  Required Braces or Orthoses?: Yes  Required Braces or Orthoses  Other: Heart Hugger Brace  Position Activity Restriction  Sternal Precautions: 5# Lifting Restrictions  Other position/activity restrictions: ambulate pt, up in a chair for meals, chest tubes, 2L NC, MVR   Subjective   Pt is sitting up in her chair upon arrival. Pt has chest tube,IV pole,External PM,02,Art. Line and wound vac. Pt c/o 4/10 Sternal pain. RN present. Orientation   WFL  Objective     Transfers  Sit to Stand: Contact guard assistance  Stand to sit: Contact guard assistance  Stand Pivot Transfers: Contact guard assistance  Comment: Increased time/effort to perform and for equipment. .  Ambulation  Ambulation?: Yes,  Distance: 300' x 1  Comment: Chair to follow,  Device;RW  Assist: CGA for pt,Min. A x 1 navigate RW,  Quality of gait: Decreased step length/ht,slow pace,  Stairs/Curb  Stairs?: No  Balance  Posture: Fair  Sitting - Static: Fair; +  Sitting - Dynamic: Fair  Standing - Static: Fair  Standing - Dynamic: Fair-   Ex's  Seated LE exercise program: [place] Quads,heel/toe raises, and marches. Reps: 20 x each with 2 lb wt on B LE's. AM-PAC Score  AM-PAC Inpatient Mobility Raw Score : 17 (12/09/21 1053)  AM-PAC Inpatient T-Scale Score : 42.13 (12/09/21 1053)  Mobility Inpatient CMS 0-100% Score: 50.57 (12/09/21 1053)  Mobility Inpatient CMS G-Code Modifier : CK (12/09/21 1053)   Goals  Short term goals  Time Frame for Short term goals: 14 visits  Short term goal 1: Pt will ambulate 300 feet with least restrictive device and supervision to increase functional independence. Short term goal 2: Pt will tolerate a 30 minute therapy session to promote increased endurance. Short term goal 3: Pt will negotiate 1 stair with no handrail and SBA to allow the pt to enter prior living arrangements. Short term goal 4: Pt will demonstrate good- standing balance to decrease fall risk. Short term goal 5: Pt will perform sit<>stand transfer with supervision to increase functional independence.     Plan    Plan  Times per week: 6-7  Times per day: Twice a day (1-2x/day)  Current Treatment Recommendations: Strengthening, Transfer Training, Endurance Training, ROM, Balance Training, Gait Training, Functional Mobility Training, Stair training, Safety Education & Training, Home Exercise Program, Equipment Evaluation, Education, & procurement, Patient/Caregiver Education & Training  Safety Devices  Type of devices: Patient at risk for falls, Left in chair, Call light within reach, Gait belt, Nurse notified  Restraints  Initially in place: No     Therapy Time   Individual Concurrent Group Co-treatment   Time In  1000         Time Out  1045         Minutes  [address] [place] [address]

## 2021-12-09 NOTE — PROGRESS NOTES
[facility]   Progress Note                 Date:   12/9/2021  Patient name:  Leo Meehan  Date of admission:  12/7/2021  5:16 AM  MRN:   [id number]  YOB: 1960  PCP:    Wolf Sanchez MD    Reason for Admission: Severe mitral regurgitation [I34.0]      Subjective:       Clinical Changes / Abnormalities:     Patient seen and examined. Off pressors      I/O last 3 completed shifts: In: 2398 [I.V.:5238; NG/GT:50; IV Piggyback:500]  Out: 3800 [Urine:2740; Chest Tube:1060]  I/O this shift: In: 727 [I.V.:727]  Out: 860 [Urine:330; Chest Tube:530]    Medications:   Scheduled Meds:    [Held by provider] metoprolol tartrate  12.5 mg Oral BID    atorvastatin  10 mg Oral Nightly    enoxaparin  1 mg/kg SubCUTAneous Q12H    docusate sodium  100 mg Oral BID    senna  1 tablet Oral Nightly    warfarin (COUMADIN) daily dosing (placeholder)   Other RX Placeholder    insulin lispro  0-6 Units SubCUTAneous TID     insulin lispro  0-3 Units SubCUTAneous Nightly    influenza virus vaccine  0.5 mL IntraMUSCular Prior to discharge    baclofen  20 mg Oral Nightly    clonazePAM  1 mg Oral Nightly    sodium chloride flush  10 mL IntraVENous 2 times per day    aspirin  81 mg Oral Daily    amiodarone  200 mg Oral TID    mupirocin   Nasal BID    polyethylene glycol  17 g Oral Daily    pantoprazole  40 mg Oral Daily    ipratropium-albuterol  1 ampule Inhalation Q4H WA     Continuous Infusions:    sodium chloride      norepinephrine 0.005 mcg/kg/min (12/09/21 0551)    dextrose       CBC:   Recent Labs     12/07/21  2201 12/07/21  2201 12/08/21  0424 12/08/21  1631 12/09/21  0327   WBC 10.2  --  10.0  --  13.2*   HGB 10.1*   < > 10.2* 10.1* 10.4*   PLT See Reflexed IPF Result  --  See Reflexed IPF Result  --  See Reflexed IPF Result    < > = values in this interval not displayed.      BMP:    Recent Labs     12/07/21  1420 12/07/21  1816 12/07/21  2201 12/08/21  0319 12/08/21  0424 12/08/21  0919 12/09/21  0327     --   --   --  144  --  142   K 3.2*   < >   < >  --  3.9 3.5* 3.1*     --   --   --  114*  --  110*   CO2 23  --   --   --  19*  --  23   BUN 15  --   --   --  12  --  7*   CREATININE 0.81   < >  --  0.87 0.88  --  0.62   GLUCOSE 126*  --   --   --  128*  --  112*    < > = values in this interval not displayed. Hepatic: No results for input(s): AST, ALT, ALB, BILITOT, ALKPHOS in the last 72 hours. Troponin: No results for input(s): TROPHS in the last 72 hours. BNP: No results for input(s): BNP in the last 72 hours. Lipids: No results for input(s): CHOL, HDL in the last 72 hours. Invalid input(s): LDLCALCU  INR:   Recent Labs     12/07/21  1420 12/08/21  0424 12/09/21  0327   INR 1.2 1.1 1.1       Objective:   Vitals: BP 93/61   Pulse 87   Temp 98.6 °F (37 °C) (Oral)   Resp 19   Ht 5' 4\" (1.626 m)   Wt 148 lb 2.4 oz (67.2 kg)   SpO2 96%   BMI 25.43 kg/m²   General appearance: Alert. No acute distress. HEENT: Head: Normal, normocephalic, atraumatic. Neck: no JVD, trachea midline  Lungs: Clear to auscultation bilaterally, no wheeze or crackles  Heart: Regular rate and rhythm, S1, S2 normal, no murmur  Abdomen: Soft, non-tender  Extremities: No edema  Neurologic: No focal neurologic deficits          Assessment:   1. Severe MR s/p  mitral valve replacement with Magna Ease + maze + CLARIBEL  2. Moderate tp severe TR  3. Paroxsymal afib, hx of cardivoersion, failed cryoablation with Dr Quinten Lock on 6/22/2020  4. LAURA  5. CKD  6. Hx of CVA           Plan / Recommendations:   1. Continue ASA and coumadin per CT surgery  2. Continue statin  3. Continue amio 200 mg tID per ct surgery recs  4. Borderline BP. Resume lopressor as tolerated by BP and HR  5. Incentive spirometry  6. Good glycemic index  7. Post op management per CT surgery        Thank you for allowing us to participate in [facility].    Electronically signed on 12/09/21 at 6:38 AM by:    Osmel Elliott Karina Blackburn MD, MD   Fellow, [address]    Attending Physician Statement  I have discussed the care of the patient, including pertinent history and exam findings, with the resident. I have seen and examined the patient and the key elements of all parts of the encounter have been performed by me. I agree with the assessment, plan and orders as documented by the resident.     Underlying ventricular standstill  Discontinue Amiodarone  Hold Coumadin  Will plan for PPM tomorrow    Sahil Gutierrez MD

## 2021-12-09 NOTE — OP NOTE
[address] [facility]                  [address], [place] [address]                                OPERATIVE REPORT    PATIENT NAME: Lynn Izquierdo                     :        1960  MED REC NO:   [id number]                             ROOM:       6529  ACCOUNT NO:   [de-identified]                           ADMIT DATE: 2021  PROVIDER:     Cruz Talbot MD    DATE OF PROCEDURE:  2021    PRIMARY ATTENDING SURGEON:  Cruz Talbot MD    OTHER ASSISTANTS:  Included Armando Irby RN, RFA    PREOPERATIVE DIAGNOSES:  Severe mitral regurgitation, volume overload,  CHF, paroxysmal atrial fibrillation, and dilated left atrium. POSTOPERATIVE DIAGNOSES:  Severe mitral regurgitation, volume overload,  CHF, paroxysmal atrial fibrillation, and dilated left atrium. OPERATIONS PERFORMED:  Median sternotomy, aorto-bicaval cardiopulmonary  bypass, transesophageal echocardiogram, mitral valve replacement with  27-mm Micheline-Mills Magna Ease valve, full left-sided atrial  CryoMaze including pulmonary vein isolation and connection to the left  atrial appendage as well as the isthmus of the mitral valve at P2, and  left atrial appendage ligation with a 50-mm left atrial appendage  occluder. COMPLICATIONS:  None. CONDITION:  Stable. DISPOSITION:  To CVICU. ESTIMATED BLOOD LOSS:  Not applicable. ANESTHESIA:  General endotracheal.    CARDIOPULMONARY BYPASS TIME:  91 minutes. CROSS-CLAMP TIME:  72 minutes. LOWEST TEMPERATURE:  32.5 degrees. 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 I felt that performing some sort of  chordal replacement for mitral repair would involve a degree of  guesswork and may not result in a perfect anatomic result.   I therefore  performed mitral valve replacement with what measured to be a 27-mm  valve which was a nice fit for the patient resulting in no residual  significant gradient nor any perivalvular leak or any regurgitation  whatsoever. I performed a full left atrial CryoMaze as described above  secondary to paroxysmal atrial fibrillation history, and I lastly  occluded the left atrial appendage as part of this with my left atrial  appendage occluding device. The procedure worked well, and the  postoperative echocardiographic appearance was excellent with a residual  mean gradient of only 1-2 mmHg. The patient was in sinus rhythm and the  heart function was normal.    OPERATIVE PROCEDURE:  The patient was identified in her bed in the CVICU  and was transported to the operating room where she was induced for  general endotracheal anesthesia without difficulty. This included an  uneventful endotracheal intubation and the appropriate placement of  lines and access for cardiac surgery. A time-out was performed and  documented. The operation began with the performance of a midline  median sternotomy. A pericardial well was created and I cannulated for  aorto-bicaval cardiopulmonary bypass which was achieved with excellent  flows and drainage after appropriate heparinization. I placed caval  tourniquet and placed a cardioplegia needle in the ascending aorta. A  Christa-Rojas retractor was brought onto the field and assembled and  a cross-clamp was applied and a dose of cold del Nido solution was  administered to achieve an excellent diastolic arrest.  The topical ice  was applied, and I approached the mitral valve by dissecting and  entering the Waterston's groove. The Rojas-Christa retractor was  used to provide excellent exposure to the mitral valve and I proceeded  first with the CryoMaze procedure as described above. Having performed  this, I assessed the valve as described above and therefore replaced the  valve as described above.   This all went smoothly and the retractor was  removed, and the Waterston's groove was closed with running Prolene  suture. I then placed the left atrial appendage 50-mm AtriClip device  on the atrial appendage for complete ligation. The cross-clamp was  removed, the heart was deaired, and there was the eventual return of  normal sinus rhythm. The caval tourniquets were removed and I placed in  atrial and ventricular wires. I eventually was able to wean the patient  from cardiopulmonary bypass with good hemodynamics on minimal inotropic  support. I thus was satisfied with this and I decannulated and  administered protamine. Protamine was administered, and hemostasis was  achieved and verified. Eventually, I placed chest tubes and closed her  sternum with the SternaLock 360 system as her sternum was very weak and  osteoporotic. The remainder of the incisions were closed in the usual  layered fashion. The patient tolerated the procedure extremely well and  was transported to the CVICU in stable condition. There were no adequately trained or available residents for assistance  in this operation, and the presence of Justino Rizo was critical for  the first assistance necessary to complete the operation. CHRISTY Wakefield MD    D: 12/08/2021 12:03:14       T: 12/08/2021 21:11:21     [id number]  Job#: [id number]     Doc#: [id number]    CC:

## 2021-12-09 NOTE — PROGRESS NOTES
Occupational Therapy   Occupational Therapy Initial Assessment  Date: 2021   Patient Name: Muriel Hubbard  MRN: [id number]     : 1960    Date of Service: 2021  No chief complaint on file. Copied from chart:  POD #1 [address], CLARIBEL YANEZ (N/A )    Discharge Recommendations:    Further therapy recommended at discharge. The patient should be able to tolerate at least 3 hours of therapy per day over 5 days or 15 hours over 7 days. OT Equipment Recommendations  Equipment Needed: Yes  Mobility Devices: Jodeen Risen; ADL Assistive Devices  Walker: Rolling  ADL Assistive Devices: Reacher; Sock-Aid Hard; Long-handled Sponge; Toileting - Standard Commode; Shower Chair with back    Assessment   Performance deficits / Impairments: Decreased functional mobility ; Decreased ADL status; Decreased ROM; Decreased strength; Decreased high-level IADLs; Decreased endurance; Decreased coordination  Assessment: Pt would benefit from acute OT and upon discharge to address the above deficits for increased independence with daily tasks. Prognosis: Good  Decision Making: Medium Complexity  Patient Education: Pt ed on POC, reason for eval, sternal precautions, safety during functional transfers/mobility - good return  REQUIRES OT FOLLOW UP: Yes  Activity Tolerance  Activity Tolerance: Patient Tolerated treatment well; Patient limited by pain; Patient limited by fatigue  Safety Devices  Safety Devices in place: Yes  Type of devices: Gait belt; Call light within reach; Left in chair  Restraints  Initially in place: No           Patient Diagnosis(es): There were no encounter diagnoses.      has a past medical history of Arm pain, Atrial fibrillation (HCC), Atrial fibrillation status post cardioversion [facility]), Atrial flutter ([facility] [address] [facility] Carotid artery stenosis, Cerebral artery occlusion with cerebral infarction [facility]), Chest pain, CHF (congestive heart failure) ([facility] Chipped tooth, COPD (chronic obstructive pulmonary Ambulation/Increased Activity; Distraction; Emotional support  Response to Pain Intervention: Patient Satisfied  Vital Signs  Pulse: 87  Resp: 17  BP: 102/67  Patient Currently in Pain: Yes  Oxygen Therapy  SpO2: 97 %  Social/Functional History  Social/Functional History  Lives With: Spouse, Family (4 YO grandson)  Type of Home: House  Home Layout: Two level, Able to Live on Main level with bedroom/bathroom  Home Access: Stairs to enter without rails  Entrance Stairs - Number of Steps: 1  Bathroom Shower/Tub: Walk-in shower  Bathroom Toilet: Handicap height  Bathroom Equipment: Grab bars in [address]:  (pt reports not owning or using any DME at a baseline)  ADL Assistance: Independent  Homemaking Assistance: Independent  Homemaking Responsibilities: Yes (shares with  and grandson)  Meal Prep Responsibility: Primary  Laundry Responsibility: Primary  Cleaning Responsibility: Primary  Ambulation Assistance: Independent  Transfer Assistance: Independent  Active : Yes  Mode of Transportation: Truck  Occupation: On disability  Leisure & Hobbies: gardening  Additional Comments: Pt reported  can provide assist 24/7       Objective   Vision: Impaired  Vision Exceptions: Wears glasses at all times  Hearing: Within functional limits    Orientation  Overall Orientation Status: Within Functional Limits     Balance  Sitting Balance: Supervision (~40 minutes in chair)  Standing Balance: Contact guard assistance  Standing Balance  Time: ~2 minutes  Activity: Pt took forward and backward steps using RW with CGA. Comment: Pt demo'd no LOB. Pt demo'd slow movements and required min VCs for initiation, sequencing, and walker progression with good return during functional mobility. Functional Mobility  Functional - Mobility Device: Rolling Walker  Activity: Other  Assist Level: Contact guard assistance  ADL  Feeding: Setup;  Increased time to complete; Modified independent   Grooming: Increased time to complete; Setup; Modified independent   UE Bathing: Stand by assistance; Increased time to complete; Setup  LE Bathing: Moderate assistance; Increased time to complete; Setup  UE Dressing: Increased time to complete; Setup; Stand by assistance  LE Dressing: Moderate assistance; Increased time to complete; Setup  Toileting: Minimal assistance; Setup; Increased time to complete  Additional Comments: OT facilitated pt in donning B socks while seated in chair with Max A due to decreased hip flexion and pain. Pt washed face sitting in chair with Mod I and increased time. Pt completed beverage and medication management seated in chair with Mod I. Tone RUE  RUE Tone: Normotonic  Tone LUE  LUE Tone: Normotonic  Coordination  Movements Are Fluid And Coordinated: No  Coordination and Movement description: Fine motor impairments; Left UE; Decreased accuracy; Decreased speed; Gross motor impairments  Quality of Movement Other  Comment: Pt demo'd decreased speed and accuracy during L opposition. Acute L UE edema noted. Bed mobility  Comment: FAVIO, pt seated in chair upon arrival and exit  Transfers  Sit to stand: Minimal assistance  Stand to sit: Contact guard assistance  Transfer Comments: with RW     Cognition  Following Commands: Follows multistep commands with repitition  Initiation: Requires cues for some  Sequencing: Requires cues for some  Cognition Comment: Pt required min VCs for initiation and sequencing during functional transfers/mobility with good return.         Sensation  Overall Sensation Status: WFL (Pt reported no N/T)        LUE AROM (degrees)  LUE AROM : Exceptions  L Shoulder Flexion 0-180: 0-70  L Elbow Flexion 0-145: WFL  L Elbow Extension 145-0: WFL  Left Hand AROM (degrees)  Left Hand AROM: WFL  RUE AROM (degrees)  RUE AROM : Exceptions  R Shoulder Flexion 0-180: 0-90  R Elbow Flexion 0-145: WFL  R Elbow Extension 145-0: WFL  Right Hand AROM (degrees)  Right Hand AROM: WFL  LUE Strength  Gross LUE

## 2021-12-09 NOTE — PROGRESS NOTES
Pharmacy Note  Warfarin Consult follow-up      Recent Labs     12/09/21  0327   INR 1.1     Recent Labs     12/07/21  2201 12/07/21  2201 12/08/21  0424 12/08/21  1631 12/09/21  0327   HGB 10.1*   < > 10.2* 10.1* 10.4*   HCT 32.2*   < > 33.9* 33.2* 33.9*   PLT See Reflexed IPF Result  --  See Reflexed IPF Result  --  See Reflexed IPF Result    < > = values in this interval not displayed. Significant Drug-Drug Interactions:  New warfarin drug-drug interactions: none  Discontinued drug-drug interactions: none  Current warfarin drug-drug interactions: acetaminophen,  aspirin, amiodarone, enoxaparin    Date INR Dose   12/7/2021 1.2 none   12/8/2021 1.1 7.5mg   12/9/2021 1.1 2.5 mg        Notes:     Order placed for warfarin 2.5 mg for this evening per home regimen. Daily PT/INR while inpatient. [address] [place] [address]  Ph., CACP, Clinical Pharmacist  Anticoagulation Services, [facility]  12/9/2021  10:16 AM

## 2021-12-09 NOTE — PLAN OF CARE
Problem: Falls - Risk of:  Goal: Will remain free from falls  Description: Will remain free from falls  12/9/2021 1119 by Sumi Braun  Outcome: Ongoing  12/8/2021 2210 by Jeanette Abraham RN  Outcome: Ongoing  Goal: Absence of physical injury  Description: Absence of physical injury  12/9/2021 1119 by Sumi Braun  Outcome: Ongoing  12/8/2021 2210 by Jeanette Abraham RN  Outcome: Ongoing     Problem: OXYGENATION/RESPIRATORY FUNCTION  Goal: Patient will maintain patent airway  12/9/2021 1119 by Sumi Braun  Outcome: Ongoing  12/8/2021 2210 by Jeanette Abraham RN  Outcome: Ongoing  Goal: Patient will achieve/maintain normal respiratory rate/effort  Description: Respiratory rate and effort will be within normal limits for the patient  12/9/2021 1119 by Sumi Braun  Outcome: Ongoing  12/8/2021 2210 by Jeanette Abraham RN  Outcome: Ongoing     Problem: MECHANICAL VENTILATION  Goal: Patient will maintain patent airway  Outcome: Ongoing  Goal: Oral health is maintained or improved  Outcome: Ongoing  Goal: ET tube will be managed safely  Outcome: Ongoing  Goal: Ability to express needs and understand communication  Outcome: Ongoing  Goal: Mobility/activity is maintained at optimum level for patient  Outcome: Ongoing     Problem: SKIN INTEGRITY  Goal: Skin integrity is maintained or improved  12/9/2021 1119 by Sumi Braun  Outcome: Ongoing  12/8/2021 2210 by Jeanette Abraham RN  Outcome: Ongoing     Problem: Discharge Planning:  Goal: Discharged to appropriate level of care  Description: Discharged to appropriate level of care  12/9/2021 1119 by Sumi Braun  Outcome: Ongoing  12/8/2021 2210 by Jeanette Abraham RN  Outcome: Ongoing     Problem: Cardiac Output - Decreased:  Goal: Cardiac output within specified parameters  Description: Cardiac output within specified parameters  12/9/2021 1119 by Sumi Braun  Outcome: Ongoing  12/8/2021 2210 by Jeanette Abraham RN  Outcome: Ongoing     Problem: Infection - Surgical Site:  Goal: Will show no infection signs and symptoms  Description: Will show no infection signs and symptoms  12/9/2021 1119 by Nathanael Chen  Outcome: Ongoing  12/8/2021 2210 by Edmundo Briscoe RN  Outcome: Ongoing     Problem: Pain - Acute:  Goal: Pain level will decrease  Description: Pain level will decrease  12/9/2021 1119 by Nathanael Chen  Outcome: Ongoing  12/8/2021 2210 by Edmundo Briscoe RN  Outcome: Ongoing     Problem: Venous Thromboembolism:  Goal: Will show no signs or symptoms of venous thromboembolism  Description: Will show no signs or symptoms of venous thromboembolism  12/9/2021 1119 by Nathanael Chen  Outcome: Ongoing  12/8/2021 2210 by Edmundo Briscoe RN  Outcome: Ongoing  Goal: Absence of signs or symptoms of impaired coagulation  Description: Absence of signs or symptoms of impaired coagulation  12/9/2021 1119 by Nathanael Chen  Outcome: Ongoing  12/8/2021 2210 by Edmundo Briscoe RN  Outcome: Ongoing     Problem: Skin Integrity:  Goal: Will show no infection signs and symptoms  Description: Will show no infection signs and symptoms  12/9/2021 1119 by Nathanael Chen  Outcome: Ongoing  12/8/2021 2210 by Edmundo Briscoe RN  Outcome: Ongoing  Goal: Absence of new skin breakdown  Description: Absence of new skin breakdown  12/9/2021 1119 by Nathanael Chen  Outcome: Ongoing  12/8/2021 2210 by Edmundo Briscoe RN  Outcome: Ongoing     Problem: Pain:  Goal: Pain level will decrease  Description: Pain level will decrease  12/9/2021 1119 by Nathanael Chen  Outcome: Ongoing  12/8/2021 2210 by Edmundo Briscoe RN  Outcome: Ongoing  Goal: Control of acute pain  Description: Control of acute pain  12/9/2021 1119 by Nathanael Chen  Outcome: Ongoing  12/8/2021 2210 by Edmundo Briscoe RN  Outcome: Ongoing  Goal: Control of chronic pain  Description: Control of chronic pain  12/9/2021 1119 by Nathanael Chen  Outcome: Ongoing  12/8/2021 2210 by Edmundo Briscoe RN  Outcome: Ongoing

## 2021-12-09 NOTE — PLAN OF CARE
Problem: Falls - Risk of:  Goal: Will remain free from falls  Description: Will remain free from falls  12/9/2021 1733 by Justin Li RN  Outcome: Ongoing  12/9/2021 1119 by Angelina Andrew  Outcome: Ongoing  Goal: Absence of physical injury  Description: Absence of physical injury  12/9/2021 1733 by Justin Li RN  Outcome: Ongoing  12/9/2021 1119 by Angelina Andrew  Outcome: Ongoing     Problem: OXYGENATION/RESPIRATORY FUNCTION  Goal: Patient will maintain patent airway  12/9/2021 1733 by Justin Li RN  Outcome: Ongoing  12/9/2021 1119 by Angelina Andrew  Outcome: Ongoing  Goal: Patient will achieve/maintain normal respiratory rate/effort  Description: Respiratory rate and effort will be within normal limits for the patient  12/9/2021 1733 by Justin Li RN  Outcome: Ongoing  12/9/2021 1119 by Angelina Andrew  Outcome: Ongoing     Problem: MECHANICAL VENTILATION  Goal: Patient will maintain patent airway  12/9/2021 1733 by Justin Li RN  Outcome: Ongoing  12/9/2021 1119 by Angelina Andrew  Outcome: Ongoing  Goal: Oral health is maintained or improved  12/9/2021 1733 by Justin Li RN  Outcome: Ongoing  12/9/2021 1119 by Angelina Andrew  Outcome: Ongoing  Goal: ET tube will be managed safely  12/9/2021 1733 by Justin Li RN  Outcome: Ongoing  12/9/2021 1119 by Angelina Andrew  Outcome: Ongoing  Goal: Ability to express needs and understand communication  12/9/2021 1733 by Justin Li RN  Outcome: Ongoing  12/9/2021 1119 by Angelina Andrew  Outcome: Ongoing  Goal: Mobility/activity is maintained at optimum level for patient  12/9/2021 1733 by Justin Li RN  Outcome: Ongoing  12/9/2021 1119 by Angelina Andrew  Outcome: Ongoing     Problem: SKIN INTEGRITY  Goal: Skin integrity is maintained or improved  12/9/2021 1733 by Justin Li RN  Outcome: Ongoing  12/9/2021 1119 by Angelina Andrew  Outcome: Ongoing     Problem: Discharge Planning:  Goal: Discharged to appropriate level of care  Description: Discharged to appropriate level of care  12/9/2021 1733 by Ree Watsno RN  Outcome: Ongoing  12/9/2021 1119 by Aimee Malik  Outcome: Ongoing     Problem: Cardiac Output - Decreased:  Goal: Cardiac output within specified parameters  Description: Cardiac output within specified parameters  12/9/2021 1733 by Ree Watson RN  Outcome: Ongoing  12/9/2021 1119 by Aimee Malik  Outcome: Ongoing     Problem: Infection - Surgical Site:  Goal: Will show no infection signs and symptoms  Description: Will show no infection signs and symptoms  12/9/2021 1733 by Ree Watson RN  Outcome: Ongoing  12/9/2021 1119 by Aimee Malik  Outcome: Ongoing     Problem: Pain - Acute:  Goal: Pain level will decrease  Description: Pain level will decrease  12/9/2021 1733 by Ree Watson RN  Outcome: Ongoing  12/9/2021 1119 by Aimee Malik  Outcome: Ongoing     Problem: Venous Thromboembolism:  Goal: Will show no signs or symptoms of venous thromboembolism  Description: Will show no signs or symptoms of venous thromboembolism  12/9/2021 1733 by Ree Watson RN  Outcome: Ongoing  12/9/2021 1119 by Aimee Malik  Outcome: Ongoing  Goal: Absence of signs or symptoms of impaired coagulation  Description: Absence of signs or symptoms of impaired coagulation  12/9/2021 1733 by Ree Watson RN  Outcome: Ongoing  12/9/2021 1119 by Aimee Malik  Outcome: Ongoing     Problem: Skin Integrity:  Goal: Will show no infection signs and symptoms  Description: Will show no infection signs and symptoms  12/9/2021 1733 by Ree Watson RN  Outcome: Ongoing  12/9/2021 1119 by Aimee Malik  Outcome: Ongoing  Goal: Absence of new skin breakdown  Description: Absence of new skin breakdown  12/9/2021 1733 by Ree Watson RN  Outcome: Ongoing  12/9/2021 1119 by Aimee Malik  Outcome: Ongoing     Problem: Pain:  Goal: Pain level will decrease  Description: Pain level will decrease  12/9/2021 1733 by Ree Watson RN  Outcome: Ongoing  12/9/2021 1119 by Aimee Malik  Outcome: Ongoing  Goal: Control of acute pain  Description: Control of acute pain  12/9/2021 1733 by Melvin Angelo RN  Outcome: Ongoing  12/9/2021 1119 by Lex Dye  Outcome: Ongoing  Goal: Control of chronic pain  Description: Control of chronic pain  12/9/2021 1733 by Melvin Angelo RN  Outcome: Ongoing  12/9/2021 1119 by Lex Dye  Outcome: Ongoing

## 2021-12-09 NOTE — PLAN OF CARE
Problem: Falls - Risk of:  Goal: Will remain free from falls  Description: Will remain free from falls  12/8/2021 2210 by Wilfredo Denver, RN  Outcome: Ongoing  12/8/2021 1755 by Mickey Tanner RN  Outcome: Ongoing  Goal: Absence of physical injury  Description: Absence of physical injury  12/8/2021 2210 by Wilfredo Denver, RN  Outcome: Ongoing  12/8/2021 1755 by Mickey Tanner RN  Outcome: Ongoing     Problem: OXYGENATION/RESPIRATORY FUNCTION  Goal: Patient will maintain patent airway  12/8/2021 2210 by Wilfredo Denver, RN  Outcome: Ongoing  12/8/2021 1755 by Mickey Tanner RN  Outcome: Ongoing  Goal: Patient will achieve/maintain normal respiratory rate/effort  Description: Respiratory rate and effort will be within normal limits for the patient  12/8/2021 2210 by Wilfredo Denver, RN  Outcome: Ongoing  12/8/2021 1755 by Mickey Tanner RN  Outcome: Ongoing      Problem: SKIN INTEGRITY  Goal: Skin integrity is maintained or improved  12/8/2021 2210 by Wilfredo Denver, RN  Outcome: Ongoing  12/8/2021 1755 by Mickey Tanner RN  Outcome: Ongoing     Problem: Discharge Planning:  Goal: Discharged to appropriate level of care  Description: Discharged to appropriate level of care  12/8/2021 2210 by Wilfredo Denver, RN  Outcome: Ongoing  12/8/2021 1755 by Mickey Tanner RN  Outcome: Ongoing     Problem: Cardiac Output - Decreased:  Goal: Cardiac output within specified parameters  Description: Cardiac output within specified parameters  12/8/2021 2210 by Wilfredo Denver, RN  Outcome: Ongoing  12/8/2021 1755 by Mickey Tanner RN  Outcome: Ongoing     Problem: Infection - Surgical Site:  Goal: Will show no infection signs and symptoms  Description: Will show no infection signs and symptoms  12/8/2021 2210 by Wilfredo Denver, RN  Outcome: Ongoing  12/8/2021 1755 by Mickey Tanner RN  Outcome: Ongoing     Problem: Pain - Acute:  Goal: Pain level will decrease  Description: Pain level will decrease  12/8/2021 2210 by Wilfredo Denver, RN  Outcome: Ongoing  12/8/2021 1755 by Guanakito Garcia RN  Outcome: Ongoing     Problem: Venous Thromboembolism:  Goal: Will show no signs or symptoms of venous thromboembolism  Description: Will show no signs or symptoms of venous thromboembolism  12/8/2021 2210 by Blake Hicks RN  Outcome: Ongoing  12/8/2021 1755 by Guanakito Garcia RN  Outcome: Ongoing  Goal: Absence of signs or symptoms of impaired coagulation  Description: Absence of signs or symptoms of impaired coagulation  12/8/2021 2210 by Blake Hicks RN  Outcome: Ongoing  12/8/2021 1755 by Guanakito Garcia RN  Outcome: Ongoing     Problem: Skin Integrity:  Goal: Will show no infection signs and symptoms  Description: Will show no infection signs and symptoms  12/8/2021 2210 by Blake Hicks RN  Outcome: Ongoing  12/8/2021 1755 by Guanakito Garcia RN  Outcome: Ongoing  Goal: Absence of new skin breakdown  Description: Absence of new skin breakdown  12/8/2021 2210 by Blake Hicks RN  Outcome: Ongoing  12/8/2021 1755 by Guanakito Garcia RN  Outcome: Ongoing     Problem: Pain:  Goal: Pain level will decrease  Description: Pain level will decrease  12/8/2021 2210 by Blake Hicks RN  Outcome: Ongoing  12/8/2021 1755 by Guanakito Garcia RN  Outcome: Ongoing  Goal: Control of acute pain  Description: Control of acute pain  12/8/2021 2210 by Blake Hicks RN  Outcome: Ongoing  12/8/2021 1755 by Guanakito Garcia RN  Outcome: Ongoing  Goal: Control of chronic pain  Description: Control of chronic pain  12/8/2021 2210 by Blake Hicks RN  Outcome: Ongoing  12/8/2021 1755 by Guanakito Garcia RN  Outcome: Ongoing

## 2021-12-10 ENCOUNTER — APPOINTMENT (OUTPATIENT)
Dept: GENERAL RADIOLOGY | Age: 61
DRG: 220 | End: 2021-12-10
Attending: THORACIC SURGERY (CARDIOTHORACIC VASCULAR SURGERY)
Payer: MEDICARE

## 2021-12-10 PROBLEM — Z95.2 S/P MVR (MITRAL VALVE REPLACEMENT): Chronic | Status: ACTIVE | Noted: 2021-12-07

## 2021-12-10 LAB
ANION GAP SERPL CALCULATED.3IONS-SCNC: 9 MMOL/L (ref 9–17)
BUN BLDV-MCNC: 12 MG/DL (ref 8–23)
BUN/CREAT BLD: ABNORMAL (ref 9–20)
CALCIUM SERPL-MCNC: 8.5 MG/DL (ref 8.6–10.4)
CHLORIDE BLD-SCNC: 111 MMOL/L (ref 98–107)
CO2: 22 MMOL/L (ref 20–31)
CREAT SERPL-MCNC: 0.63 MG/DL (ref 0.5–0.9)
GFR AFRICAN AMERICAN: >60 ML/MIN
GFR NON-AFRICAN AMERICAN: >60 ML/MIN
GFR SERPL CREATININE-BSD FRML MDRD: ABNORMAL ML/MIN/{1.73_M2}
GFR SERPL CREATININE-BSD FRML MDRD: ABNORMAL ML/MIN/{1.73_M2}
GLUCOSE BLD-MCNC: 106 MG/DL (ref 70–99)
GLUCOSE BLD-MCNC: 120 MG/DL (ref 65–105)
GLUCOSE BLD-MCNC: 120 MG/DL (ref 65–105)
HCT VFR BLD CALC: 32.3 % (ref 36.3–47.1)
HEMOGLOBIN: 10.2 G/DL (ref 11.9–15.1)
INR BLD: 1.4
MAGNESIUM: 2.2 MG/DL (ref 1.6–2.6)
MCH RBC QN AUTO: 30 PG (ref 25.2–33.5)
MCHC RBC AUTO-ENTMCNC: 31.6 G/DL (ref 28.4–34.8)
MCV RBC AUTO: 95 FL (ref 82.6–102.9)
NRBC AUTOMATED: 0 PER 100 WBC
PDW BLD-RTO: 15.9 % (ref 11.8–14.4)
PLATELET # BLD: ABNORMAL K/UL (ref 138–453)
PLATELET, FLUORESCENCE: 113 K/UL (ref 138–453)
PLATELET, IMMATURE FRACTION: 6.7 % (ref 1.1–10.3)
PMV BLD AUTO: ABNORMAL FL (ref 8.1–13.5)
POC ANGLE TEG W HEP: 68.6 DEG (ref 59–74)
POC ANGLE TEG W HEP: 70.1 DEG (ref 59–74)
POC ANGLE TEG: 70.5 DEG (ref 59–74)
POC ANGLE TEG: 72 DEG (ref 59–74)
POC EPL TEG W/HEP: 0.2 % (ref 0–15)
POC EPL TEG W/HEP: NORMAL % (ref 0–15)
POC EPL TEG: 0 % (ref 0–15)
POC EPL TEG: NORMAL % (ref 0–15)
POC KINETICS TEG W HEP: 1.5 MIN (ref 1–3)
POC KINETICS TEG W HEP: 1.6 MIN (ref 1–3)
POC KINETICS TEG: 1.3 MIN (ref 1–3)
POC KINETICS TEG: 1.5 MIN (ref 1–3)
POC LY30(LYSIS) TEG W HEP: 0.2 % (ref 0–8)
POC LY30(LYSIS) TEG W HEP: NORMAL % (ref 0–8)
POC LY30(LYSIS) TEG: 0 % (ref 0–8)
POC LY30(LYSIS) TEG: NORMAL % (ref 0–8)
POC MA(MAX CLOT) TEG: 69.9 MM (ref 55–74)
POC MA(MAX CLOT) TEG: 76.2 MM (ref 55–74)
POC MAX CLOT TEG W HEP: 68 MM (ref 55–74)
POC MAX CLOT TEG W HEP: 73 MM (ref 55–74)
POC REACTION TIME TEG W HEP: 7.9 MIN (ref 4–9)
POC REACTION TIME TEG W HEP: 8 MIN (ref 4–9)
POC REACTION TIME TEG: 7.2 MIN (ref 4–9)
POC REACTION TIME TEG: 7.8 MIN (ref 4–9)
POTASSIUM SERPL-SCNC: 4.6 MMOL/L (ref 3.7–5.3)
PROTHROMBIN TIME: 14.6 SEC (ref 9.1–12.3)
RBC # BLD: 3.4 M/UL (ref 3.95–5.11)
SODIUM BLD-SCNC: 142 MMOL/L (ref 135–144)
SURGICAL PATHOLOGY REPORT: NORMAL
TEG COMMENT: ABNORMAL
TEG COMMENT: NORMAL
WBC # BLD: 9 K/UL (ref 3.5–11.3)

## 2021-12-10 PROCEDURE — 6360000002 HC RX W HCPCS

## 2021-12-10 PROCEDURE — C1769 GUIDE WIRE: HCPCS

## 2021-12-10 PROCEDURE — 6360000002 HC RX W HCPCS: Performed by: INTERNAL MEDICINE

## 2021-12-10 PROCEDURE — C1894 INTRO/SHEATH, NON-LASER: HCPCS

## 2021-12-10 PROCEDURE — 10700061 HC NON-CHARGEABLE SUPPLY

## 2021-12-10 PROCEDURE — 10700061 HC MISC INTRODUCER/SHEATH

## 2021-12-10 PROCEDURE — 6370000000 HC RX 637 (ALT 250 FOR IP): Performed by: NURSE PRACTITIONER

## 2021-12-10 PROCEDURE — 2709999900 HC NON-CHARGEABLE SUPPLY

## 2021-12-10 PROCEDURE — 99024 POSTOP FOLLOW-UP VISIT: CPT | Performed by: NURSE PRACTITIONER

## 2021-12-10 PROCEDURE — 94761 N-INVAS EAR/PLS OXIMETRY MLT: CPT

## 2021-12-10 PROCEDURE — 0JH606Z INSERTION OF PACEMAKER, DUAL CHAMBER INTO CHEST SUBCUTANEOUS TISSUE AND FASCIA, OPEN APPROACH: ICD-10-PCS | Performed by: INTERNAL MEDICINE

## 2021-12-10 PROCEDURE — 85055 RETICULATED PLATELET ASSAY: CPT

## 2021-12-10 PROCEDURE — 02H60JZ INSERTION OF PACEMAKER LEAD INTO RIGHT ATRIUM, OPEN APPROACH: ICD-10-PCS | Performed by: INTERNAL MEDICINE

## 2021-12-10 PROCEDURE — 2500000003 HC RX 250 WO HCPCS

## 2021-12-10 PROCEDURE — 97116 GAIT TRAINING THERAPY: CPT

## 2021-12-10 PROCEDURE — 6360000002 HC RX W HCPCS: Performed by: NURSE PRACTITIONER

## 2021-12-10 PROCEDURE — 83735 ASSAY OF MAGNESIUM: CPT

## 2021-12-10 PROCEDURE — 2100000001 HC CVICU R&B

## 2021-12-10 PROCEDURE — 10700061 HC MISC GUIDEWIRE

## 2021-12-10 PROCEDURE — 2700000000 HC OXYGEN THERAPY PER DAY

## 2021-12-10 PROCEDURE — 94640 AIRWAY INHALATION TREATMENT: CPT

## 2021-12-10 PROCEDURE — 71045 X-RAY EXAM CHEST 1 VIEW: CPT

## 2021-12-10 PROCEDURE — 82947 ASSAY GLUCOSE BLOOD QUANT: CPT

## 2021-12-10 PROCEDURE — APPNB45 APP NON BILLABLE 31-45 MINUTES: Performed by: NURSE PRACTITIONER

## 2021-12-10 PROCEDURE — 02HK0JZ INSERTION OF PACEMAKER LEAD INTO RIGHT VENTRICLE, OPEN APPROACH: ICD-10-PCS | Performed by: INTERNAL MEDICINE

## 2021-12-10 PROCEDURE — 80048 BASIC METABOLIC PNL TOTAL CA: CPT

## 2021-12-10 PROCEDURE — 2580000003 HC RX 258: Performed by: NURSE PRACTITIONER

## 2021-12-10 PROCEDURE — 97110 THERAPEUTIC EXERCISES: CPT

## 2021-12-10 PROCEDURE — C1785 PMKR, DUAL, RATE-RESP: HCPCS

## 2021-12-10 PROCEDURE — 2580000003 HC RX 258: Performed by: STUDENT IN AN ORGANIZED HEALTH CARE EDUCATION/TRAINING PROGRAM

## 2021-12-10 PROCEDURE — 97535 SELF CARE MNGMENT TRAINING: CPT

## 2021-12-10 PROCEDURE — 85027 COMPLETE CBC AUTOMATED: CPT

## 2021-12-10 PROCEDURE — 2580000003 HC RX 258: Performed by: INTERNAL MEDICINE

## 2021-12-10 PROCEDURE — 85610 PROTHROMBIN TIME: CPT

## 2021-12-10 PROCEDURE — C1898 LEAD, PMKR, OTHER THAN TRANS: HCPCS

## 2021-12-10 RX ORDER — SODIUM CHLORIDE 0.9 % (FLUSH) 0.9 %
5-40 SYRINGE (ML) INJECTION PRN
Status: DISCONTINUED | OUTPATIENT
Start: 2021-12-10 | End: 2021-12-14 | Stop reason: HOSPADM

## 2021-12-10 RX ORDER — SODIUM CHLORIDE 0.9 % (FLUSH) 0.9 %
5-40 SYRINGE (ML) INJECTION EVERY 12 HOURS SCHEDULED
Status: DISCONTINUED | OUTPATIENT
Start: 2021-12-10 | End: 2021-12-14 | Stop reason: HOSPADM

## 2021-12-10 RX ORDER — SODIUM CHLORIDE 9 MG/ML
25 INJECTION, SOLUTION INTRAVENOUS PRN
Status: DISCONTINUED | OUTPATIENT
Start: 2021-12-10 | End: 2021-12-14 | Stop reason: HOSPADM

## 2021-12-10 RX ADMIN — ONDANSETRON 4 MG: 2 INJECTION INTRAMUSCULAR; INTRAVENOUS at 11:21

## 2021-12-10 RX ADMIN — SENNOSIDES 8.6 MG: 8.6 TABLET, COATED ORAL at 20:22

## 2021-12-10 RX ADMIN — VANCOMYCIN HYDROCHLORIDE: 1 INJECTION, POWDER, LYOPHILIZED, FOR SOLUTION INTRAVENOUS at 15:30

## 2021-12-10 RX ADMIN — MUPIROCIN: 20 OINTMENT TOPICAL at 08:39

## 2021-12-10 RX ADMIN — ACETAMINOPHEN 650 MG: 325 TABLET ORAL at 20:19

## 2021-12-10 RX ADMIN — SODIUM CHLORIDE, PRESERVATIVE FREE 10 ML: 5 INJECTION INTRAVENOUS at 20:23

## 2021-12-10 RX ADMIN — IPRATROPIUM BROMIDE AND ALBUTEROL SULFATE 1 AMPULE: .5; 2.5 SOLUTION RESPIRATORY (INHALATION) at 07:53

## 2021-12-10 RX ADMIN — CLONAZEPAM 1 MG: 1 TABLET ORAL at 20:19

## 2021-12-10 RX ADMIN — SODIUM CHLORIDE, PRESERVATIVE FREE 10 ML: 5 INJECTION INTRAVENOUS at 20:22

## 2021-12-10 RX ADMIN — DOCUSATE SODIUM 100 MG: 100 CAPSULE ORAL at 20:21

## 2021-12-10 RX ADMIN — MUPIROCIN: 20 OINTMENT TOPICAL at 20:23

## 2021-12-10 RX ADMIN — PANTOPRAZOLE SODIUM 40 MG: 40 TABLET, DELAYED RELEASE ORAL at 08:34

## 2021-12-10 RX ADMIN — BACLOFEN 20 MG: 10 TABLET ORAL at 20:21

## 2021-12-10 RX ADMIN — DIPHENHYDRAMINE HCL 25 MG: 25 TABLET ORAL at 20:19

## 2021-12-10 RX ADMIN — IPRATROPIUM BROMIDE AND ALBUTEROL SULFATE 1 AMPULE: .5; 2.5 SOLUTION RESPIRATORY (INHALATION) at 11:49

## 2021-12-10 RX ADMIN — Medication 81 MG: at 08:34

## 2021-12-10 RX ADMIN — POTASSIUM CHLORIDE 20 MEQ: 29.8 INJECTION INTRAVENOUS at 01:13

## 2021-12-10 RX ADMIN — IPRATROPIUM BROMIDE AND ALBUTEROL SULFATE 1 AMPULE: .5; 2.5 SOLUTION RESPIRATORY (INHALATION) at 20:38

## 2021-12-10 RX ADMIN — SODIUM CHLORIDE, PRESERVATIVE FREE 10 ML: 5 INJECTION INTRAVENOUS at 08:39

## 2021-12-10 RX ADMIN — ATORVASTATIN CALCIUM 10 MG: 10 TABLET, FILM COATED ORAL at 20:21

## 2021-12-10 RX ADMIN — MIDODRINE HYDROCHLORIDE 5 MG: 5 TABLET ORAL at 12:07

## 2021-12-10 RX ADMIN — MIDODRINE HYDROCHLORIDE 5 MG: 5 TABLET ORAL at 08:34

## 2021-12-10 RX ADMIN — IPRATROPIUM BROMIDE AND ALBUTEROL SULFATE 1 AMPULE: .5; 2.5 SOLUTION RESPIRATORY (INHALATION) at 16:02

## 2021-12-10 ASSESSMENT — PAIN SCALES - GENERAL
PAINLEVEL_OUTOF10: 2
PAINLEVEL_OUTOF10: 0
PAINLEVEL_OUTOF10: 3
PAINLEVEL_OUTOF10: 0

## 2021-12-10 ASSESSMENT — PAIN DESCRIPTION - PAIN TYPE: TYPE: ACUTE PAIN;SURGICAL PAIN

## 2021-12-10 ASSESSMENT — PAIN DESCRIPTION - ORIENTATION: ORIENTATION: LEFT

## 2021-12-10 ASSESSMENT — PAIN - FUNCTIONAL ASSESSMENT: PAIN_FUNCTIONAL_ASSESSMENT: ACTIVITIES ARE NOT PREVENTED

## 2021-12-10 ASSESSMENT — PAIN DESCRIPTION - LOCATION
LOCATION: CHEST
LOCATION: SHOULDER

## 2021-12-10 ASSESSMENT — PAIN DESCRIPTION - FREQUENCY: FREQUENCY: INTERMITTENT

## 2021-12-10 ASSESSMENT — PAIN DESCRIPTION - DESCRIPTORS: DESCRIPTORS: ACHING;DULL

## 2021-12-10 NOTE — PROGRESS NOTES
Physical Therapy  Facility/Department: [facility] 1  Daily Treatment Note  NAME: Rhea Willingham  : 1960  MRN: [id number]    Date of Service: 12/10/2021    Discharge Recommendations:  Patient would benefit from continued therapy after discharge     PT Equipment Recommendations  Equipment Needed: No     Assessment     Body structures, Functions, Activity limitations: Decreased functional mobility ; Decreased posture; Decreased endurance; Decreased ROM; Decreased strength; Decreased balance; Decreased safe awareness  Assessment: Pt with mobility deficits requiring min-A to perform bed mobility, CGA to perform sit<>stand and stand<>pivot transfer with no device. Pt ambulated 300' x 1 with RW and CGA. Pt limited this date secondary to decreased endurance, decreased bilateral LE strength, and decreased trunk control. Pt would benefit from additional therapy upon discharge to assist in return to prior level of functional independence. Prognosis: Good  Decision Making: High Complexity  PT Education: Transfer Training; Goals; PT Role; Functional Mobility Training; General Safety; Plan of Care; Gait Training; Precautions  Patient Education: Pt was educated on sternal/pacemaker precautions. REQUIRES PT FOLLOW UP: Yes  Activity Tolerance  Activity Tolerance: Patient limited by endurance; Patient limited by fatigue         Patient Diagnosis(es): There were no encounter diagnoses.      has a past medical history of Arm pain, Atrial fibrillation (HCC), Atrial fibrillation status post cardioversion [facility]), Atrial flutter ([facility] [address] Cancer ([facility] [address] Carotid artery stenosis, Cerebral artery occlusion with cerebral infarction [facility]), Chest pain, CHF (congestive heart failure) ([facility] [address] Chipped tooth, COPD (chronic obstructive pulmonary disease) ([facility] [address] Depression, Former tobacco use, H/O: CVA (cerebrovascular accident), Heart palpitations, History of lateral epicondylitis of right elbow, Hx of blood clots, Hyperlipidemia, Hyperparathyroidism [facility]), Hypertensive chronic kidney disease with stage 1 through stage 4 chronic kidney disease, or unspecified chronic kidney disease, Long term current use of anticoagulant, Mitral regurgitation, Mitral valve insufficiency, Mitral valve regurgitation, Muscle spasm, Nerve pain, Osteoporosis, Personal history of other medical treatment, Radial tunnel syndrome, Restless legs, SOB (shortness of breath), Tennis elbow, Under care of team, Under care of team, Under care of team, Under care of team, and Wears glasses. has a past surgical history that includes  section; Wrist ganglion excision (Right); Cardioversion (2016); Cardioversion (2019); Colonoscopy; ablation of dysrhythmic focus (2020); transesophageal echocardiogram (10/05/2021); Cardiac catheterization (2021); Endoscopy, colon, diagnostic; eye surgery; skin biopsy; [place] tooth extraction; and Mitral valve repair (N/A, 2021). Restrictions  Restrictions/Precautions  Restrictions/Precautions: Fall Risk  Required Braces or Orthoses?: Yes  Required Braces or Orthoses  Other: Heart Hugger Brace  Position Activity Restriction  Sternal Precautions: 5# Lifting Restrictions  Other position/activity restrictions: ambulate pt, up in a chair for meals, chest tubes, 2L NC, MVR , art line,PPM precautions L UE 90° or below. Subjective   Pt is sitting up in her chair upon arrival. Pt has chest tube and External PM, Per Pt reports getting a PPM later today. Pt has no c/o pain. Orientation   WFL  Objective     Transfers  Sit to Stand: Contact guard assistance  Stand to sit: Contact guard assistance  Stand Pivot Transfers: Contact guard assistance  Comment: Verbal cues for hand placement with her heart hugger,pushing off and to exhale when sitting/standing with good return.   Ambulation  Ambulation?: Yes,  Distance: 300' x 1  Device;RW  Assist: CGA   Quality of gait: Decreased step length/ht,slow pace,  Stairs/Curb  Stairs?: No  Balance  Posture: Good  Sitting - Static: Good  Sitting - Dynamic: Good  Standing - Static: Fair+  Standing - Dynamic: Fair-   Ex's  Seated LE exercise program: Long Arc Quads,heel/toe raises, and marches. Reps: 20 x each with 2 lb wt on B LE's. AM-PAC Score  AM-PAC Inpatient Mobility Raw Score : 17 (12/10/21 0915)  AM-PAC Inpatient T-Scale Score : 42.13 (12/10/21 0915)  Mobility Inpatient CMS 0-100% Score: 50.57 (12/10/21 0915)  Mobility Inpatient CMS G-Code Modifier : CK (12/10/21 0915)   Goals  Short term goals  Time Frame for Short term goals: 14 visits  Short term goal 1: Pt will ambulate 300 feet with least restrictive device and supervision to increase functional independence. Short term goal 2: Pt will tolerate a 30 minute therapy session to promote increased endurance. Short term goal 3: Pt will negotiate 1 stair with no handrail and SBA to allow the pt to enter prior living arrangements. Short term goal 4: Pt will demonstrate good- standing balance to decrease fall risk. Short term goal 5: Pt will perform sit<>stand transfer with supervision to increase functional independence.     Plan    Plan  Times per week: 6-7  Times per day: Twice a day (1-2x/day)  Current Treatment Recommendations: Strengthening, Transfer Training, Endurance Training, ROM, Balance Training, Gait Training, Functional Mobility Training, Stair training, Safety Education & Training, Home Exercise Program, Equipment Evaluation, Education, & procurement, Patient/Caregiver Education & Training  Safety Devices  Type of devices: Patient at risk for falls, Left in chair, Call light within reach, Gait belt, Nurse notified  Restraints  Initially in place: No     Therapy Time   Individual Concurrent Group Co-treatment   Time In   830         Time Out   900         Minutes   [address] [place] [address]

## 2021-12-10 NOTE — PLAN OF CARE
Problem: Falls - Risk of:  Goal: Will remain free from falls  Description: Will remain free from falls  12/9/2021 2317 by Jeanette Abraham RN  Outcome: Ongoing  12/9/2021 1733 by Aniket Nguyen RN  Outcome: Ongoing  12/9/2021 1119 by Sumi Braun  Outcome: Ongoing  Goal: Absence of physical injury  Description: Absence of physical injury  12/9/2021 2317 by Jeanette Abraham RN  Outcome: Ongoing  12/9/2021 1733 by Aniket Nguyen RN  Outcome: Ongoing  12/9/2021 1119 by Sumi Braun  Outcome: Ongoing     Problem: OXYGENATION/RESPIRATORY FUNCTION  Goal: Patient will maintain patent airway  12/9/2021 2317 by Jeanette Abraham RN  Outcome: Ongoing  12/9/2021 1733 by Aniket Nguyen RN  Outcome: Ongoing  12/9/2021 1119 by Sumi Braun  Outcome: Ongoing  Goal: Patient will achieve/maintain normal respiratory rate/effort  Description: Respiratory rate and effort will be within normal limits for the patient  12/9/2021 2317 by Jeanette Abraham RN  Outcome: Ongoing  12/9/2021 1733 by Aniket Nguyen RN  Outcome: Ongoing  12/9/2021 1119 by Sumi Braun  Outcome: Ongoing     Problem: SKIN INTEGRITY  Goal: Skin integrity is maintained or improved  12/9/2021 2317 by Jeanette Abraham RN  Outcome: Ongoing  12/9/2021 1733 by Aniket Nguyen RN  Outcome: Ongoing  12/9/2021 1119 by Sumi Braun  Outcome: Ongoing     Problem: Discharge Planning:  Goal: Discharged to appropriate level of care  Description: Discharged to appropriate level of care  12/9/2021 2317 by Jeanette Abraham RN  Outcome: Ongoing  12/9/2021 1733 by Aniket Nguyen RN  Outcome: Ongoing  12/9/2021 1119 by Sumi Braun  Outcome: Ongoing     Problem: Cardiac Output - Decreased:  Goal: Cardiac output within specified parameters  Description: Cardiac output within specified parameters  12/9/2021 2317 by Jeanette Abraham RN  Outcome: Ongoing  12/9/2021 1733 by Aniket Nguyen RN  Outcome: Ongoing  12/9/2021 1119 by Sumi Braun  Outcome: Ongoing     Problem: Infection - Surgical Site:  Goal: Will show no infection signs and symptoms  Description: Will show no infection signs and symptoms  12/9/2021 2317 by Cheryl Robles RN  Outcome: Ongoing  12/9/2021 1733 by Jose Roberto Solis RN  Outcome: Ongoing  12/9/2021 1119 by Imani Michel  Outcome: Ongoing     Problem: Pain - Acute:  Goal: Pain level will decrease  Description: Pain level will decrease  12/9/2021 2317 by Cheryl Robles RN  Outcome: Ongoing  12/9/2021 1733 by Jose Roberto Solis RN  Outcome: Ongoing  12/9/2021 1119 by Imani Michel  Outcome: Ongoing     Problem: Venous Thromboembolism:  Goal: Will show no signs or symptoms of venous thromboembolism  Description: Will show no signs or symptoms of venous thromboembolism  12/9/2021 2317 by Cheryl Robles RN  Outcome: Ongoing  12/9/2021 1733 by Jose Roberto Solis RN  Outcome: Ongoing  12/9/2021 1119 by Imani Michel  Outcome: Ongoing  Goal: Absence of signs or symptoms of impaired coagulation  Description: Absence of signs or symptoms of impaired coagulation  12/9/2021 2317 by Cheryl Robles RN  Outcome: Ongoing  12/9/2021 1733 by Jose Roberto Solis RN  Outcome: Ongoing  12/9/2021 1119 by Imani Michel  Outcome: Ongoing     Problem: Skin Integrity:  Goal: Will show no infection signs and symptoms  Description: Will show no infection signs and symptoms  12/9/2021 2317 by Cheryl Robles RN  Outcome: Ongoing  12/9/2021 1733 by Jose Roberto Solis RN  Outcome: Ongoing  12/9/2021 1119 by Imani Michel  Outcome: Ongoing  Goal: Absence of new skin breakdown  Description: Absence of new skin breakdown  12/9/2021 2317 by Cheryl Robles RN  Outcome: Ongoing  12/9/2021 1733 by Jose Roberto Solis RN  Outcome: Ongoing  12/9/2021 1119 by Imani Michel  Outcome: Ongoing     Problem: Pain:  Goal: Pain level will decrease  Description: Pain level will decrease  12/9/2021 2317 by Cheryl Robles RN  Outcome: Ongoing  12/9/2021 1733 by Jose Roberto Solis RN  Outcome: Ongoing  12/9/2021 1119 by Imani Michel  Outcome: Ongoing  Goal: Control of acute pain  Description: Control of acute

## 2021-12-10 NOTE — PROGRESS NOTES
Occupational Therapy  Facility/Department: [facility]  Daily Treatment Note  NAME: Leo Meehan  : 1960  MRN: [id number]    Date of Service: 12/10/2021    Discharge Recommendations:    Further therapy recommended at discharge. OT Equipment Recommendations  Equipment Needed: Yes  Mobility Devices: Reese Rear: Rolling  ADL Assistive Devices: Reacher; Sock-Aid Hard; Long-handled Sponge; Toileting - Standard Commode; Shower Chair with back    Assessment   Performance deficits / Impairments: Decreased functional mobility ; Decreased ADL status; Decreased high-level IADLs; Decreased endurance; Decreased cognition; Decreased strength; Decreased ROM  Prognosis: Good  Decision Making: Medium Complexity  Patient Education: Pt ed on POC, reason for eval, sternal precautions, safety during functional transfers/mobility, EC/WS - good return  REQUIRES OT FOLLOW UP: Yes  Activity Tolerance  Activity Tolerance: Patient limited by fatigue; Patient Tolerated treatment well  Safety Devices  Safety Devices in place: Yes  Type of devices: Gait belt; Call light within reach; Left in chair  Restraints  Initially in place: No         Patient Diagnosis(es): There were no encounter diagnoses.       has a past medical history of Arm pain, Atrial fibrillation (HCC), Atrial fibrillation status post cardioversion [facility]), Atrial flutter ([facility] [address] Cancer ([facility] [address] Carotid artery stenosis, Cerebral artery occlusion with cerebral infarction [facility]), Chest pain, CHF (congestive heart failure) ([facility] [address] Chipped tooth, COPD (chronic obstructive pulmonary disease) ([facility] [address] Depression, Former tobacco use, H/O: CVA (cerebrovascular accident), Heart palpitations, History of lateral epicondylitis of right elbow, Hx of blood clots, Hyperlipidemia, Hyperparathyroidism ([facility] [address] Hypertensive chronic kidney disease with stage 1 through stage 4 chronic kidney disease, or unspecified chronic kidney disease, Long term current use of anticoagulant, Mitral regurgitation, Mitral valve insufficiency, Mitral valve regurgitation, Muscle spasm, Nerve pain, Osteoporosis, Personal history of other medical treatment, Radial tunnel syndrome, Restless legs, SOB (shortness of breath), Tennis elbow, Under care of team, Under care of team, Under care of team, Under care of team, and Wears glasses. has a past surgical history that includes  section; Wrist ganglion excision (Right); Cardioversion (2016); Cardioversion (2019); Colonoscopy; ablation of dysrhythmic focus (2020); transesophageal echocardiogram (10/05/2021); Cardiac catheterization (2021); Endoscopy, colon, diagnostic; eye surgery; skin biopsy; [place] tooth extraction; and Mitral valve repair (N/A, 2021). Restrictions  Restrictions/Precautions  Restrictions/Precautions: Fall Risk  Required Braces or Orthoses?: Yes  Required Braces or Orthoses  Other: Heart Hugger Brace  Position Activity Restriction  Sternal Precautions: 5# Lifting Restrictions  Other position/activity restrictions: ambulate pt, up in a chair for meals, chest tubes, 2L NC, MVR , art line  Subjective   General  Patient assessed for rehabilitation services?: Yes  Family / Caregiver Present: Yes (Pt  present during session)  General Comment  Comments: RN ok'd OT tx this date. Pt agreeable and cooperative during session. Vital Signs  Patient Currently in Pain: Denies   Orientation  Orientation  Overall Orientation Status: Within Functional Limits  Objective    ADL  Grooming: Modified independent ; Setup; Increased time to complete  Additional Comments: OT facilitated pt in oral care in bathroom with pt standing during prep however pt reported nausea required seated break. Pt vomitted and RN notified. Pt completed oral care sitting in bathroom with Mod I. Pt washed face seated in bathroom with Mod I and VC to use L UE. Pt required increased time for tasks due to nausea.         Balance  Sitting Balance: Modified independent (~35 minutes in recliner and chair in bathroom.)  Standing Balance: Contact guard assistance  Standing Balance  Time: ~5 minutes  Activity: Pt completed static/dynamic standing sinkside during prep for oral care. Pt completed functional mobility to/from bathroom. Comment: Pt demo'd no LOB. Pt required min VCs for initation and sequencing with good return  Functional Mobility  Functional - Mobility Device: Rolling Walker  Activity: To/from bathroom  Assist Level: Contact guard assistance  Bed mobility  Comment: FAVIO, pt seated in chair upon arrival and exit. Transfers  Sit to stand: Contact guard assistance (CGA from reclining chair, Min A from chair in bathroom.)  Stand to sit: Contact guard assistance  Transfer Comments: wiht RW                       Cognition  Overall Cognitive Status: Exceptions  Arousal/Alertness: Delayed responses to stimuli  Following Commands: Follows multistep commands with repitition  Initiation: Requires cues for some  Sequencing: Requires cues for some  Cognition Comment: Some improvement noted this date however still required min VCs for initiation and sequencing during functional transfer/mobility with good return. Plan   Plan  Times per week: 4-6x/wk (MVR)    AM-PAC Score        -[facility] Inpatient Daily Activity Raw Score: 18 (12/10/21 1330)  AM-PAC Inpatient ADL T-Scale Score : 38.66 (12/10/21 1330)  ADL Inpatient CMS 0-100% Score: 46.65 (12/10/21 1330)  ADL Inpatient CMS G-Code Modifier : CK (12/10/21 1330)    Goals  Short term goals  Time Frame for Short term goals: Pt will by discharge  Short term goal 1: complete UB dressing with SBA, setup, AE, and modified tech PRN. Short term goal 2: complete LB ADLs with Min A, setup, AE, and modified tech PRN. Short term goal 3: compele functional transfers/mobility with SBA using LRD PRN.   Short term goal 4: demo 10+ minutes static/dynamic standing tolerance with SBA using LRD PRN during functional tasks (updated by Montana Munoz on 12/10)  Short term goal 5: incorporate use of L UE during functional tasks to increase ROM and strength.        Therapy Time   Individual Concurrent Group Co-treatment   Time In 1052         Time Out 1135         Minutes 43         Timed Code Treatment Minutes: 38 Minutes       Niurka Vargas S/OT

## 2021-12-10 NOTE — PROGRESS NOTES
Pharmacy Note  Warfarin Consult follow-up      Recent Labs     12/10/21  0334   INR 1.4     Recent Labs     12/08/21  0424 12/08/21  0424 12/08/21  1631 12/09/21  0327 12/10/21  0334   HGB 10.2*   < > 10.1* 10.4* 10.2*   HCT 33.9*   < > 33.2* 33.9* 32.3*   PLT See Reflexed IPF Result  --   --  See Reflexed IPF Result See Reflexed IPF Result    < > = values in this interval not displayed. Significant Drug-Drug Interactions:  New warfarin drug-drug interactions: N/A  Discontinued drug-drug interactions: Amiodarone, Enoxaparin    Current warfarin drug-drug interactions: Acetaminophen, Aspirin    Date INR Dose   12/7/2021 1.2 none   12/8/2021 1.1 7.5mg   12/9/2021 1.1 Dose discontinued   12/10/2021 1.4 HOLD       Notes:                   INR increased as a result of boost dose on 12/08/2021  Permanent pacemaker placement planned for today. Confirmed with nurse on CAR1  Will hold warfarin today in accordance with Cardiology note  Daily PT/INR while inpatient.      Maryam KcD  PGY2 Ambulatory Care Pharmacy Resident    12/10/2021 8:09 AM

## 2021-12-10 NOTE — OP NOTE
[facility] Cardiology Consultant                Procedure Note  Dual Chamber PPM      Galileo Norman (89 y.o., female)  1960      12/10/2021      Procedure: DC PPM    Operators:  Primary: Lotus Pike MD    Indication: Intermittent Complete Heart Block     / Device Data:        Name Implant Date   [employer] 12/10/21   [facility]    KVALØYSLETTA           Model # Serial #   [place] [id number]   RA-Lead [id number]   RV-Lead [id number]   LV-Lead N/A N/A       ATRIUM R VENT L VENT   P waves 2.0 mV R waves: 4.2mV R waves: N/A   Threshold: 1.2/0. 1 Threshold: 1.2/1. 3 Threshold: N/A   Impedance: 490 Impedance: 764 Impedance:  N/A       [x] Sedation monitoring  [] Left Subclavian Angiogram   [x] RV lead   [x] RA lead   [] LV lead   [x] Intra - OP Lead Testing  [] Coronary Sinus Angiogram   [x] Pocket   [x] Generators Implant  [x] Fluoro time: 2.2 min      Procedure  After the usual preparation of the left neck and chest, the patient was draped in the usual sterile manner. Local anesthesia was infused below the left clavicle from the midline laterally. An incision was made inferior and parallel to the clavicle. The incision was carried down to the fascia. A pocket was formed inferior using blunt dissection. A thin walled 18 gauge needle was used to puncture the left subclavian vein using the modified Seldinger technique. A guide wire was passed into the right heart under fluoroscopic control. This was repeated with a second guide wire. RV Lead Implant:  A 9.0 Canadian sheath was then passed over the guide wire and the guide wire removed. The ventricular lead was advanced through the sheath into the right heart. The lead was then positioned in the right ventricle under fluoroscopic control. The acute pacing and sensing thresholds were measured and found to be satisfactory.       RA Lead Implant:  A second 7.0 Canadian sheath was then passed over the guide wire and the guide wire removed. The atrial lead was advanced into the right heart. The lead was then positioned in the right atrium. The acute pacing and sensing thresholds were measured and found to be acceptable. The two sheaths were removed and the leads were secured to the fascia. Generator: The implanted leads were attached to the pacemaker using the setscrews. The pocket was irrigated with antibiotic solution. The pulse generator and leads were coiled and placed in the pocket. Fluoroscopy was used to verify the final placement of the pacemaker and leads. The pocket was closed using multiple layers of suture and a dry sterile dressing was applied. There were no complications, patient tolerated the procedure well. The patient left the EP lab in stable condition. Impression / Device:  Successful Implantation of: DC-PPM  Estimated blood loss less than 25 ml    Plan:  Telemetry monitoring  Interrogate pacemaker before discharge  CXR if needed  Wound check at [facility] in 7days  Discharge if patient remains stable    Patient instructed to no showering or get the wound wet for 1 week, no driving for 1 week, no lifting more than 10 pounds for 4 weeks, no arm raising above the shoulder for 4 weeks. No lovenox or heparin at any dose is to be given    Electronically signed by Kristie Ward MD on 12/10/2021 at 6:31 PM      I have reviewed the case / procedure with resident / fellow  I have examined the patient personally  Patient agree with treatment plan as discussed before, final arrangement based on my evaluation and exam.    Risk and benefit of procedure planned were explained in details. Procedure was performed by me personally, with all aspect of the procedure being done using standard protocol. Note was modified based on my own assessment and treatment.     Kristina Espinal MD  [facility]

## 2021-12-10 NOTE — PROGRESS NOTES
[facility]   Progress Note                 Date:   12/10/2021  Patient name:  Natasha Rees  Date of admission:  12/7/2021  5:16 AM  MRN:   [id number]  YOB: 1960  PCP:    Shayne Jaimes MD    Reason for Admission: Severe mitral regurgitation [I34.0]      Subjective:       Clinical Changes / Abnormalities:     Patient seen and examined. Off pressors      I/O last 3 completed shifts: In: 1852 [P.O.:551; I.V.:1301]  Out: 2260 [Urine:800; Chest Tube:1460]  I/O this shift:  In: -   Out: 500 [Urine:280; Chest Tube:220]    Medications:   Scheduled Meds:    midodrine  5 mg Oral TID WC    [Held by provider] metoprolol tartrate  12.5 mg Oral BID    atorvastatin  10 mg Oral Nightly    docusate sodium  100 mg Oral BID    senna  1 tablet Oral Nightly    warfarin (COUMADIN) daily dosing (placeholder)   Other RX Placeholder    insulin lispro  0-6 Units SubCUTAneous TID WC    insulin lispro  0-3 Units SubCUTAneous Nightly    influenza virus vaccine  0.5 mL IntraMUSCular Prior to discharge    baclofen  20 mg Oral Nightly    clonazePAM  1 mg Oral Nightly    sodium chloride flush  10 mL IntraVENous 2 times per day    aspirin  81 mg Oral Daily    mupirocin   Nasal BID    polyethylene glycol  17 g Oral Daily    pantoprazole  40 mg Oral Daily    ipratropium-albuterol  1 ampule Inhalation Q4H WA     Continuous Infusions:    sodium chloride      norepinephrine Stopped (12/09/21 1840)    dextrose       CBC:   Recent Labs     12/08/21  0424 12/08/21  0424 12/08/21  1631 12/09/21  0327 12/10/21  0334   WBC 10.0  --   --  13.2* 9.0   HGB 10.2*   < > 10.1* 10.4* 10.2*   PLT See Reflexed IPF Result  --   --  See Reflexed IPF Result See Reflexed IPF Result    < > = values in this interval not displayed.      BMP:    Recent Labs     12/08/21  0424 12/08/21  0919 12/09/21  0327 12/09/21  0327 12/09/21  1216 12/09/21  2314 12/10/21  0334     --  142  --   --   --  142   K 3.9   < > 3.1*   < > 3. 6* 3.8 4.6   *  --  110*  --   --   --  111*   CO2 19*  --  23  --   --   --  22   BUN 12  --  7*  --   --   --  12   CREATININE 0.88  --  0.62  --   --   --  0.63   GLUCOSE 128*  --  112*  --   --   --  106*    < > = values in this interval not displayed. Hepatic: No results for input(s): AST, ALT, ALB, BILITOT, ALKPHOS in the last 72 hours. Troponin: No results for input(s): TROPHS in the last 72 hours. BNP: No results for input(s): BNP in the last 72 hours. Lipids: No results for input(s): CHOL, HDL in the last 72 hours. Invalid input(s): LDLCALCU  INR:   Recent Labs     12/08/21  0424 12/09/21  0327 12/10/21  0334   INR 1.1 1.1 1.4       Objective:   Vitals: /76   Pulse 98   Temp 98.6 °F (37 °C) (Oral)   Resp 19   Ht 5' 4\" (1.626 m)   Wt 147 lb 14.9 oz (67.1 kg)   SpO2 95%   BMI 25.39 kg/m²   General appearance: Alert. No acute distress. HEENT: Head: Normal, normocephalic, atraumatic. Neck: no JVD, trachea midline  Lungs: Clear to auscultation bilaterally, no wheeze or crackles  Heart: Regular rate and rhythm, S1, S2 normal, no murmur  Abdomen: Soft, non-tender  Extremities: No edema  Neurologic: No focal neurologic deficits          Assessment:   1. Severe MR s/p  mitral valve replacement with Magna Ease + maze + CLARIBEL  2. Moderate tp severe TR  3. Paroxsymal afib, hx of cardivoersion, failed cryoablation with Dr Vicenta Jose on 6/22/2020  4. LAURA  5. CKD  6. Hx of CVA           Plan / Recommendations:   1. Continue ASA and coumadin per CT surgery  2. Continue statin  3. Discontinue amiodarone. Hold coumadin  4. Planned for PPM today  5. Borderline BP. Resume lopressor when tolerated by BP and HR  6. Incentive spirometry  7. Good glycemic index  8. Post op management per CT surgery        Thank you for allowing us to participate in [facility].    Electronically signed on 12/10/21 at 6:37 AM by:    Nick Cerna MD, MD   Fellow, Cardiovascular Diseases  [facility]. [place] [address]    Attending Physician Statement  I have discussed the care of the patient, including pertinent history and exam findings, with the resident. I have seen and examined the patient and the key elements of all parts of the encounter have been performed by me. I agree with the assessment, plan and orders as documented by the resident.     Plan for PPM today  Will resume after      Jorje Hanson MD

## 2021-12-10 NOTE — PROGRESS NOTES
[facility]  Daily Progress Note    Surgeon: Dr. Sun Sprague  S/P: MITRAL VALVE REPLACE, MAZE, CLARIBEL   POD#: 3  EF: 50-55%     Subjective:  Ms. Abdi Alexander feels better today with no acute complaints. Patient is up in the chair. Remains pacer dependent, plan for permanent pacemaker placement today with Cardiology. She states that her breathing is ok and her pain is tolerable. Requesting that home dose of Requip be resume, explained that we will assess hemodynamic status following procedure today before deciding. Physical Exam  Vital Signs: /68   Pulse 87   Temp 97.9 °F (36.6 °C) (Oral)   Resp 17   Ht 5' 4\" (1.626 m)   Wt 147 lb 14.9 oz (67.1 kg)   SpO2 95%   BMI 25.39 kg/m²  O2 Flow Rate (L/min): 1 L/min   Admit Weight: Weight: 145 lb 8.1 oz (66 kg)   WEIGHTWeight: 147 lb 14.9 oz (67.1 kg)     General: alert and oriented to person, place and time, well-developed and well-nourished, in no acute distress. Heart: Normal S1 and S2.  Regular rhythm. No murmurs, gallops, or rubs. Pacing Wires: Yes - To be clipped prior to discharge  Lungs: clear to auscultation bilaterally and diminished breath sounds bibasilar Chest tubes: Yes, Air Leak: yes  Abdomen: soft, non tender, non distended, BS hypoactive x4  Extremities: Trace edema  Wounds: clean and dry, healing appropriately. Prevena to Sternum.       Sternum: Covered  SVG sites: Covered    Scheduled Meds:    midodrine  5 mg Oral TID WC    [Held by provider] metoprolol tartrate  12.5 mg Oral BID    atorvastatin  10 mg Oral Nightly    docusate sodium  100 mg Oral BID    senna  1 tablet Oral Nightly    warfarin (COUMADIN) daily dosing (placeholder)   Other RX Placeholder    insulin lispro  0-6 Units SubCUTAneous TID WC    insulin lispro  0-3 Units SubCUTAneous Nightly    influenza virus vaccine  0.5 mL IntraMUSCular Prior to discharge    baclofen  20 mg Oral Nightly    clonazePAM  1 mg Oral Nightly    sodium chloride flush  10 mL IntraVENous 2 times per day    aspirin  81 mg Oral Daily    mupirocin   Nasal BID    polyethylene glycol  17 g Oral Daily    pantoprazole  40 mg Oral Daily    ipratropium-albuterol  1 ampule Inhalation Q4H WA     Continuous Infusions:    sodium chloride      norepinephrine Stopped (12/09/21 1840)    dextrose         Data:  CBC:   Recent Labs     12/08/21  0424 12/08/21  0424 12/08/21  1631 12/09/21  0327 12/10/21  0334   WBC 10.0  --   --  13.2* 9.0   HGB 10.2*   < > 10.1* 10.4* 10.2*   HCT 33.9*   < > 33.2* 33.9* 32.3*   MCV 97.1  --   --  97.1 95.0   PLT See Reflexed IPF Result  --   --  See Reflexed IPF Result See Reflexed IPF Result    < > = values in this interval not displayed. BMP:   Recent Labs     12/08/21  0424 12/08/21  0919 12/09/21  0327 12/09/21  0327 12/09/21  1216 12/09/21  2314 12/10/21  0334     --  142  --   --   --  142   K 3.9   < > 3.1*   < > 3.6* 3.8 4.6   *  --  110*  --   --   --  111*   CO2 19*  --  23  --   --   --  22   BUN 12  --  7*  --   --   --  12   CREATININE 0.88  --  0.62  --   --   --  0.63    < > = values in this interval not displayed.      PT/INR:   Recent Labs     12/08/21  0424 12/09/21  0327 12/10/21  0334   PROTIME 11.6 12.0 14.6*   INR 1.1 1.1 1.4     APTT:   Recent Labs     12/07/21  1420   APTT 27.6       Chest X-Ray:   ONE XRAY VIEW OF THE CHEST       12/10/2021 6:08 am       COMPARISON:   12/09/2021, 12/08/2021       HISTORY:   ORDERING SYSTEM PROVIDED HISTORY: Post op CABG   TECHNOLOGIST PROVIDED HISTORY:   Post op CABG   Reason for Exam: Upright port, CABG       FINDINGS:   Chest tubes remain in place.  Trace left apical pneumothorax again   demonstrated.  A trace right apical pneumothorax appears more conspicuous in   the interval.  Vascular congestion, small pleural effusions and basilar   atelectasis again demonstrated.  The cardiac and mediastinal contours appear   unchanged.           Impression   1.  Trace right apical pneumothorax appears more conspicuous in the interval.       2.  Similar appearance of trace left apical pneumothorax with left chest tube   in place.       3.  No significant change in vascular congestion, basilar atelectasis and   small effusions. I/O:  I/O last 3 completed shifts:   In: 2227 [P.O.:551; I.V.:574]  Out: 1745 [Urine:735; Chest Tube:1010]    Assessment/Plan:      Diagnosis Date    Arm pain Right and Left    Atrial fibrillation (HCC)     Atrial fibrillation status post cardioversion [facility])     Atrial flutter (HCC)     Cancer (HCC)     skin    Carotid artery stenosis     Cerebral artery occlusion with cerebral infarction [facility])     Chest pain 11/2003    CH[facility] failure) [facility] [address]     Chipped tooth 12/01/2021    Top left    COPD (chronic obstructive pulmonary disease) ([facility] [address]     Depression     Former tobacco use     H/O: CVA (cerebrovascular accident)     1-    Heart palpitations     History of lateral epicondylitis of right elbow     And left elbow    Hx of blood clots     Hyperlipidemia     [facility]parathyroidism ([facility] [address]     Hypertensive chronic kidney disease with stage 1 through stage 4 chronic kidney disease, or unspecified chronic kidney disease     Long term current use of anticoagulant     Mitral regurgitation     Mitral valve insufficiency     Mitral valve regurgitation     Muscle spasm     Nerve pain     Osteoporosis     Personal history of other medical treatment     Chronic diastolic congestive heart failure    Radial tunnel syndrome Right radial tunnel release     Restless legs     SOB (shortness of breath) 11/08/2021    Tennis elbow Right and left    Under care of team 12/01/2021    Cardiologist: Lucía Hanson, last visit 8/2021    Under care of team 12/01/2021    PCP: soren Dunlapmonsepideh, last visit 6/2021    Under care of team 12/01/2021    Neurology: Dr. Samy Covarrubias, last visit 11/2021    Under care of team 12/01/2021    Urology:  Bijan Tesfaye, last visit 10/2021    Wears glasses       Neuro: Intact   Lungs: clear, diminished in the bases   HD: VSS. Weaned vasopressors off   Edema: trace/1+ peripheral    GI: Hypoactive bowel sounds X4   : Good urine output- zayas in place    Beta-Blocker: yes- with holding parameters. ASA: yes  Plavix: no  GI: yes  Statin: yes  Coumadin: yes  ACE-I: no  EF: 50-55%     Oxygen as needed to maintain SpO2 > 92%  o Wean as tolerated  o Use Bipap as needed   Chest x-ray daily   Keep Chest Tubes to wall suction   Encourage incentive spirometry, acapella and ambulation    Plan to place permanent pacer today  o Hold coumadin, resume tomorrow   Coumadin - dosing per pharmacy (hold)  o INR goal 2.0-3.0  o Will bridge with lovenox   Replace electrolytes as needed per sliding scale and recheck per policy   Case Management consult for discharge planning- home with home care    The above recommendations including medications and orders were discussed and agreed upon with Dr. Sun Sprague, the attending on service for the cardiothoracic surgery group today. Electronically signed by SARAH Russell CNP on 12/10/2021 at 10:33 AM    On this date 12/10/2021 I have spent 34 minutes reviewing previous notes, test results and face to face with the patient discussing the diagnosis and importance of compliance with the treatment plan as well as documenting on the day of the visit. At least 50% of the time documented was spent with the patient to provide counseling and/or coordination of care. This note was created with the assistance of a speech-recognition program.  Although the intention is to generate a document that actually reflects the content of the visit, no guarantees can be provided that every mistake has been identified and corrected by editing. Note was updated later by me after  physical examination and  completion of the assessment.

## 2021-12-10 NOTE — PLAN OF CARE
Problem: Falls - Risk of:  Goal: Will remain free from falls  Description: Will remain free from falls  12/10/2021 1830 by Rich Lara RN  Outcome: Ongoing  12/10/2021 1829 by Rich Lara RN  Outcome: Ongoing  12/10/2021 1821 by Rich Lara RN  Outcome: Ongoing  Goal: Absence of physical injury  Description: Absence of physical injury  12/10/2021 1830 by Rich Lara RN  Outcome: Ongoing  12/10/2021 1829 by Rich Lara RN  Outcome: Ongoing  12/10/2021 1821 by Rich Lara RN  Outcome: Ongoing     Problem: OXYGENATION/RESPIRATORY FUNCTION  Goal: Patient will maintain patent airway  12/10/2021 1830 by Rich Lara RN  Outcome: Ongoing  12/10/2021 1829 by Rich Lara RN  Outcome: Ongoing  12/10/2021 1821 by Rich Lara RN  Outcome: Ongoing  Goal: Patient will achieve/maintain normal respiratory rate/effort  Description: Respiratory rate and effort will be within normal limits for the patient  12/10/2021 1830 by Rich Lara RN  Outcome: Ongoing  12/10/2021 1829 by Rich Lara RN  Outcome: Ongoing     Problem: SKIN INTEGRITY  Goal: Skin integrity is maintained or improved  12/10/2021 1830 by Rich Lara RN  Outcome: Ongoing  12/10/2021 1829 by Rich Lara RN  Outcome: Ongoing  12/10/2021 1821 by Rich Lara RN  Outcome: Ongoing     Problem: Discharge Planning:  Goal: Discharged to appropriate level of care  Description: Discharged to appropriate level of care  12/10/2021 1830 by Rich Lara RN  Outcome: Ongoing  12/10/2021 1829 by Rich Lara RN  Outcome: Ongoing  12/10/2021 1821 by Rich Lara RN  Outcome: Ongoing

## 2021-12-10 NOTE — PROGRESS NOTES
[facility]  Daily Progress Note    Surgeon: Dr. Cesar Cooper  S/P : MITRAL VALVE REPLACE, MAZE, CLARIBEL   POD#: 2  EF: 50-55%     Subjective:  Ms. Gee Alvarado feels ok today with no acute complaints. Patient is up in the chair. She states that her breathing is ok and her pain is tolerable. Physical Exam  Vital Signs: BP (!) 108/51   Pulse 87   Temp 98.3 °F (36.8 °C) (Oral)   Resp 17   Ht 5' 4\" (1.626 m)   Wt 148 lb 2.4 oz (67.2 kg)   SpO2 93%   BMI 25.43 kg/m²  O2 Flow Rate (L/min): 3 L/min   Admit Weight: Weight: 145 lb 8.1 oz (66 kg)   WEIGHTWeight: 148 lb 2.4 oz (67.2 kg)     General: alert and oriented to person, place and time, well-developed and well-nourished, in no acute distress. Heart: Normal S1 and S2.  Regular rhythm. No murmurs, gallops, or rubs. Pacing Wires: Yes - To be clipped prior to discharge  Lungs: clear to auscultation bilaterally and diminished breath sounds bibasilar Chest tubes: Yes, Air Leak: yes  Abdomen: soft, non tender, non distended, BS hypoactive x4  Extremities: Trace edema  Wounds: clean and dry, healing appropriately. Prevena to Sternum.       Sternum: Covered  SVG sites: Covered    Scheduled Meds:    midodrine  5 mg Oral TID WC    [Held by provider] metoprolol tartrate  12.5 mg Oral BID    atorvastatin  10 mg Oral Nightly    docusate sodium  100 mg Oral BID    senna  1 tablet Oral Nightly    warfarin (COUMADIN) daily dosing (placeholder)   Other RX Placeholder    insulin lispro  0-6 Units SubCUTAneous TID WC    insulin lispro  0-3 Units SubCUTAneous Nightly    influenza virus vaccine  0.5 mL IntraMUSCular Prior to discharge    baclofen  20 mg Oral Nightly    clonazePAM  1 mg Oral Nightly    sodium chloride flush  10 mL IntraVENous 2 times per day    aspirin  81 mg Oral Daily    mupirocin   Nasal BID    polyethylene glycol  17 g Oral Daily    pantoprazole  40 mg Oral Daily    ipratropium-albuterol  1 ampule Inhalation Q4H WA Continuous Infusions:    sodium chloride      norepinephrine Stopped (12/09/21 1840)    dextrose         Data:  CBC:   Recent Labs     12/07/21 2201 12/07/21 2201 12/08/21 0424 12/08/21  1631 12/09/21  0327   WBC 10.2  --  10.0  --  13.2*   HGB 10.1*   < > 10.2* 10.1* 10.4*   HCT 32.2*   < > 33.9* 33.2* 33.9*   MCV 95.3  --  97.1  --  97.1   PLT See Reflexed IPF Result  --  See Reflexed IPF Result  --  See Reflexed IPF Result    < > = values in this interval not displayed. BMP:   Recent Labs     12/07/21  1420 12/07/21  1816 12/07/21 2201 12/08/21 0319 12/08/21 0424 12/08/21  0424 12/08/21  0919 12/09/21  0327 12/09/21  1216     --   --   --  144  --   --  142  --    K 3.2*   < >   < >  --  3.9   < > 3.5* 3.1* 3.6*     --   --   --  114*  --   --  110*  --    CO2 23  --   --   --  19*  --   --  23  --    BUN 15  --   --   --  12  --   --  7*  --    CREATININE 0.81   < >  --  0.87 0.88  --   --  0.62  --     < > = values in this interval not displayed. PT/INR:   Recent Labs     12/07/21 1420 12/08/21 0424 12/09/21 0327   PROTIME 12.5* 11.6 12.0   INR 1.2 1.1 1.1     APTT:   Recent Labs     12/07/21 1420   APTT 27.6       Chest X-Ray:   FINDINGS:   Support lines and tubes appear unchanged in position. The cardiomediastinal   silhouette is unchanged the heart border mildly obscured on the left. Hazy   right lower lobe opacities noted. Small left pleural effusion is unchanged. Similar small apical left pneumothorax.  Questionable new small right apical   pneumothorax measuring 5 mm pleural apical separation.  Osseous structures   are grossly unchanged     I/O:  I/O last 3 completed shifts:   In: 0901 [P.O.:476; I.V.:3265; IV Piggyback:500]  Out: 8900 [Urine:1395; Chest Tube:1680]    Assessment/Plan:      Diagnosis Date    Arm pain Right and Left    Atrial fibrillation (HCC)     Atrial fibrillation status post cardioversion [facility])     Atrial flutter ([facility]     Cancer ([facility] [address] skin    Carotid artery stenosis     Cerebral artery occlusion with cerebral infarction [facility])     Chest pain 11/2003    CHF (congestive heart failure) ([facility] [address]     Chipped tooth 12/01/2021    Top left    COPD (chronic obstructive pulmonary disease) (HCC)     Depression     Former tobacco use     H/O: CVA (cerebrovascular accident)     1-    Heart palpitations     History of lateral epicondylitis of right elbow     And left elbow    Hx of blood clots     Hyperlipidemia     Hyperparathyroidism ([facility] [address]     Hypertensive chronic kidney disease with stage 1 through stage 4 chronic kidney disease, or unspecified chronic kidney disease     Long term current use of anticoagulant     Mitral regurgitation     Mitral valve insufficiency     Mitral valve regurgitation     Muscle spasm     Nerve pain     Osteoporosis     Personal history of other medical treatment     Chronic diastolic congestive heart failure    Radial tunnel syndrome Right radial tunnel release     Restless legs     SOB (shortness of breath) 11/08/2021    Tennis elbow Right and left    Under care of team 12/01/2021    Cardiologist: Marcella Guardado, last visit 8/2021    Under care of team 12/01/2021    PCP: Dr. Andrew [place], last visit 6/2021    Under care of team 12/01/2021    Neurology: Dr. Carolin Little, last visit 11/2021    Under care of team 12/01/2021    Urology: Dr. Jaxson Palm, last visit 10/2021    Wears glasses       Neuro: Intact   Lungs: clear, diminished in the bases   HD: VSS. Weaned vasopressors off   Edema: trace/1+ peripheral    GI: Hypoactive bowel sounds X4   : Good urine output- zayas in place    Beta-Blocker: yes- with holding parameters.    ASA: yes  Plavix: no  GI: yes  Statin: yes  Coumadin: yes  ACE-I: no  EF: 50-55%     Oxygen as needed to maintain SpO2 > 92%  o Wean as tolerted  o Use Bipap as needed   Chest x-ray daily   Keep Chest Tubes to wall suction   Encourage incentive spirometry, acapella and ambulation    Will discuss with cardiology about the need for a permanent pacer. o May need to hold coumadin until decision   Will restart Coumadin- dosing per pharmacy  o INR goal 2.0-3.0  o Will bridge with lovenox   Replace electrolytes as needed per sliding scale and recheck per policy   Case Management consult for discharge planning- home with home care    The above recommendations including medications and orders were discussed and agreed upon with Dr. Etta Brown, the attending on service for the cardiothoracic surgery group today. Electronically signed by SARAH Horton CNP on 12/9/2021 at 8:41 PM    On this date 12/9/2021 I have spent 44 minutes reviewing previous notes, test results and face to face with the patient discussing the diagnosis and importance of compliance with the treatment plan as well as documenting on the day of the visit. At least 50% of the time documented was spent with the patient to provide counseling and/or coordination of care. This note was created with the assistance of a speech-recognition program.  Although the intention is to generate a document that actually reflects the content of the visit, no guarantees can be provided that every mistake has been identified and corrected by editing. Note was updated later by me after  physical examination and  completion of the assessment.

## 2021-12-11 ENCOUNTER — APPOINTMENT (OUTPATIENT)
Dept: GENERAL RADIOLOGY | Age: 61
DRG: 220 | End: 2021-12-11
Attending: THORACIC SURGERY (CARDIOTHORACIC VASCULAR SURGERY)
Payer: MEDICARE

## 2021-12-11 LAB
ANION GAP SERPL CALCULATED.3IONS-SCNC: 10 MMOL/L (ref 9–17)
BUN BLDV-MCNC: 23 MG/DL (ref 8–23)
BUN/CREAT BLD: ABNORMAL (ref 9–20)
CALCIUM SERPL-MCNC: 8.8 MG/DL (ref 8.6–10.4)
CHLORIDE BLD-SCNC: 106 MMOL/L (ref 98–107)
CO2: 21 MMOL/L (ref 20–31)
CREAT SERPL-MCNC: 0.84 MG/DL (ref 0.5–0.9)
GFR AFRICAN AMERICAN: >60 ML/MIN
GFR NON-AFRICAN AMERICAN: >60 ML/MIN
GFR SERPL CREATININE-BSD FRML MDRD: ABNORMAL ML/MIN/{1.73_M2}
GFR SERPL CREATININE-BSD FRML MDRD: ABNORMAL ML/MIN/{1.73_M2}
GLUCOSE BLD-MCNC: 100 MG/DL (ref 70–99)
GLUCOSE BLD-MCNC: 101 MG/DL (ref 65–105)
GLUCOSE BLD-MCNC: 104 MG/DL (ref 65–105)
GLUCOSE BLD-MCNC: 110 MG/DL (ref 65–105)
GLUCOSE BLD-MCNC: 85 MG/DL (ref 65–105)
HCT VFR BLD CALC: 34.4 % (ref 36.3–47.1)
HEMOGLOBIN: 10.9 G/DL (ref 11.9–15.1)
INR BLD: 1.6
MAGNESIUM: 2.3 MG/DL (ref 1.6–2.6)
MCH RBC QN AUTO: 29.6 PG (ref 25.2–33.5)
MCHC RBC AUTO-ENTMCNC: 31.7 G/DL (ref 28.4–34.8)
MCV RBC AUTO: 93.5 FL (ref 82.6–102.9)
NRBC AUTOMATED: 0 PER 100 WBC
PDW BLD-RTO: 15.8 % (ref 11.8–14.4)
PLATELET # BLD: 169 K/UL (ref 138–453)
PMV BLD AUTO: 11.6 FL (ref 8.1–13.5)
POTASSIUM SERPL-SCNC: 4.4 MMOL/L (ref 3.7–5.3)
PROTHROMBIN TIME: 16.1 SEC (ref 9.1–12.3)
RBC # BLD: 3.68 M/UL (ref 3.95–5.11)
SODIUM BLD-SCNC: 137 MMOL/L (ref 135–144)
WBC # BLD: 9.2 K/UL (ref 3.5–11.3)

## 2021-12-11 PROCEDURE — 36415 COLL VENOUS BLD VENIPUNCTURE: CPT

## 2021-12-11 PROCEDURE — 6370000000 HC RX 637 (ALT 250 FOR IP): Performed by: NURSE PRACTITIONER

## 2021-12-11 PROCEDURE — 6370000000 HC RX 637 (ALT 250 FOR IP): Performed by: THORACIC SURGERY (CARDIOTHORACIC VASCULAR SURGERY)

## 2021-12-11 PROCEDURE — 2140000001 HC CVICU INTERMEDIATE R&B

## 2021-12-11 PROCEDURE — 83735 ASSAY OF MAGNESIUM: CPT

## 2021-12-11 PROCEDURE — 94640 AIRWAY INHALATION TREATMENT: CPT

## 2021-12-11 PROCEDURE — 2580000003 HC RX 258: Performed by: NURSE PRACTITIONER

## 2021-12-11 PROCEDURE — 80048 BASIC METABOLIC PNL TOTAL CA: CPT

## 2021-12-11 PROCEDURE — 6360000002 HC RX W HCPCS: Performed by: NURSE PRACTITIONER

## 2021-12-11 PROCEDURE — 71045 X-RAY EXAM CHEST 1 VIEW: CPT

## 2021-12-11 PROCEDURE — C1894 INTRO/SHEATH, NON-LASER: HCPCS

## 2021-12-11 PROCEDURE — 2700000000 HC OXYGEN THERAPY PER DAY

## 2021-12-11 PROCEDURE — 2709999900 HC NON-CHARGEABLE SUPPLY

## 2021-12-11 PROCEDURE — 85027 COMPLETE CBC AUTOMATED: CPT

## 2021-12-11 PROCEDURE — 97116 GAIT TRAINING THERAPY: CPT

## 2021-12-11 PROCEDURE — 85610 PROTHROMBIN TIME: CPT

## 2021-12-11 PROCEDURE — 97110 THERAPEUTIC EXERCISES: CPT

## 2021-12-11 PROCEDURE — 2580000003 HC RX 258: Performed by: STUDENT IN AN ORGANIZED HEALTH CARE EDUCATION/TRAINING PROGRAM

## 2021-12-11 RX ORDER — WARFARIN SODIUM 7.5 MG/1
3.75 TABLET ORAL
Status: COMPLETED | OUTPATIENT
Start: 2021-12-11 | End: 2021-12-11

## 2021-12-11 RX ORDER — FUROSEMIDE 10 MG/ML
20 INJECTION INTRAMUSCULAR; INTRAVENOUS DAILY
Status: DISCONTINUED | OUTPATIENT
Start: 2021-12-11 | End: 2021-12-14 | Stop reason: HOSPADM

## 2021-12-11 RX ADMIN — OXYCODONE HYDROCHLORIDE AND ACETAMINOPHEN 1 TABLET: 5; 325 TABLET ORAL at 04:09

## 2021-12-11 RX ADMIN — DOCUSATE SODIUM 100 MG: 100 CAPSULE ORAL at 08:30

## 2021-12-11 RX ADMIN — PANTOPRAZOLE SODIUM 40 MG: 40 TABLET, DELAYED RELEASE ORAL at 08:30

## 2021-12-11 RX ADMIN — POTASSIUM CHLORIDE 40 MEQ: 1500 TABLET, EXTENDED RELEASE ORAL at 14:58

## 2021-12-11 RX ADMIN — DOCUSATE SODIUM 100 MG: 100 CAPSULE ORAL at 20:36

## 2021-12-11 RX ADMIN — WARFARIN SODIUM 3.75 MG: 7.5 TABLET ORAL at 17:41

## 2021-12-11 RX ADMIN — IPRATROPIUM BROMIDE AND ALBUTEROL SULFATE 1 AMPULE: .5; 2.5 SOLUTION RESPIRATORY (INHALATION) at 07:58

## 2021-12-11 RX ADMIN — ONDANSETRON 4 MG: 2 INJECTION INTRAMUSCULAR; INTRAVENOUS at 12:56

## 2021-12-11 RX ADMIN — MIDODRINE HYDROCHLORIDE 5 MG: 5 TABLET ORAL at 17:41

## 2021-12-11 RX ADMIN — MIDODRINE HYDROCHLORIDE 5 MG: 5 TABLET ORAL at 08:29

## 2021-12-11 RX ADMIN — CLONAZEPAM 1 MG: 1 TABLET ORAL at 20:36

## 2021-12-11 RX ADMIN — OXYCODONE HYDROCHLORIDE AND ACETAMINOPHEN 1 TABLET: 5; 325 TABLET ORAL at 00:07

## 2021-12-11 RX ADMIN — POLYETHYLENE GLYCOL 3350 17 G: 17 POWDER, FOR SOLUTION ORAL at 08:35

## 2021-12-11 RX ADMIN — SODIUM CHLORIDE, PRESERVATIVE FREE 10 ML: 5 INJECTION INTRAVENOUS at 08:36

## 2021-12-11 RX ADMIN — IPRATROPIUM BROMIDE AND ALBUTEROL SULFATE 1 AMPULE: .5; 2.5 SOLUTION RESPIRATORY (INHALATION) at 21:34

## 2021-12-11 RX ADMIN — SODIUM CHLORIDE, PRESERVATIVE FREE 10 ML: 5 INJECTION INTRAVENOUS at 21:21

## 2021-12-11 RX ADMIN — OXYCODONE HYDROCHLORIDE AND ACETAMINOPHEN 2 TABLET: 5; 325 TABLET ORAL at 16:24

## 2021-12-11 RX ADMIN — IPRATROPIUM BROMIDE AND ALBUTEROL SULFATE 1 AMPULE: .5; 2.5 SOLUTION RESPIRATORY (INHALATION) at 11:32

## 2021-12-11 RX ADMIN — MIDODRINE HYDROCHLORIDE 5 MG: 5 TABLET ORAL at 12:18

## 2021-12-11 RX ADMIN — SODIUM CHLORIDE, PRESERVATIVE FREE 10 ML: 5 INJECTION INTRAVENOUS at 08:31

## 2021-12-11 RX ADMIN — SENNOSIDES 8.6 MG: 8.6 TABLET, COATED ORAL at 20:36

## 2021-12-11 RX ADMIN — FUROSEMIDE 20 MG: 10 INJECTION, SOLUTION INTRAMUSCULAR; INTRAVENOUS at 12:18

## 2021-12-11 RX ADMIN — MUPIROCIN: 20 OINTMENT TOPICAL at 08:32

## 2021-12-11 RX ADMIN — IPRATROPIUM BROMIDE AND ALBUTEROL SULFATE 1 AMPULE: .5; 2.5 SOLUTION RESPIRATORY (INHALATION) at 15:19

## 2021-12-11 RX ADMIN — Medication 81 MG: at 08:30

## 2021-12-11 RX ADMIN — ATORVASTATIN CALCIUM 10 MG: 10 TABLET, FILM COATED ORAL at 20:36

## 2021-12-11 RX ADMIN — BACLOFEN 20 MG: 10 TABLET ORAL at 20:36

## 2021-12-11 RX ADMIN — SODIUM CHLORIDE, PRESERVATIVE FREE 10 ML: 5 INJECTION INTRAVENOUS at 08:32

## 2021-12-11 RX ADMIN — MUPIROCIN: 20 OINTMENT TOPICAL at 20:37

## 2021-12-11 RX ADMIN — ONDANSETRON 4 MG: 2 INJECTION INTRAMUSCULAR; INTRAVENOUS at 06:46

## 2021-12-11 ASSESSMENT — PAIN DESCRIPTION - PROGRESSION
CLINICAL_PROGRESSION: NOT CHANGED

## 2021-12-11 ASSESSMENT — PAIN SCALES - GENERAL
PAINLEVEL_OUTOF10: 2
PAINLEVEL_OUTOF10: 4
PAINLEVEL_OUTOF10: 5
PAINLEVEL_OUTOF10: 7
PAINLEVEL_OUTOF10: 0

## 2021-12-11 ASSESSMENT — PAIN DESCRIPTION - PAIN TYPE
TYPE: ACUTE PAIN;SURGICAL PAIN
TYPE: ACUTE PAIN;SURGICAL PAIN

## 2021-12-11 ASSESSMENT — PAIN DESCRIPTION - LOCATION: LOCATION: CHEST

## 2021-12-11 ASSESSMENT — PAIN DESCRIPTION - DESCRIPTORS: DESCRIPTORS: DISCOMFORT

## 2021-12-11 NOTE — PROGRESS NOTES
[facility]   Progress Note                    Date:   12/11/2021  Patient name:  Jovanni Ngo  Date of admission:  12/7/2021  5:16 AM  MRN:   [id number]  YOB: 1960  PCP:    Amparo Funes MD    Reason for Admission:  Severe mitral regurgitation [I34.0]    Subjective:        Clinical Changes / Abnormalities:    Patient seen and examined at bedside. No acute events overnight. Underwent permanent pacemaker implantation. Tolerated procedure well. Reports no chest pain or shortness of breath this morning. Laying comfortably in bed.     Urine output in the last 24 hours:     Intake/Output Summary (Last 24 hours) at 12/11/2021 3382  Last data filed at 12/11/2021 4178  Gross per 24 hour   Intake 100 ml   Output 1283 ml   Net -1183 ml     I/O since admission: +792.7 cc    Medications:   Scheduled Meds:   sodium chloride flush  5-40 mL IntraVENous 2 times per day    sodium chloride flush  5-40 mL IntraVENous 2 times per day    midodrine  5 mg Oral TID WC    [Held by provider] metoprolol tartrate  12.5 mg Oral BID    atorvastatin  10 mg Oral Nightly    docusate sodium  100 mg Oral BID    senna  1 tablet Oral Nightly    warfarin (COUMADIN) daily dosing (placeholder)   Other RX Placeholder    insulin lispro  0-6 Units SubCUTAneous TID WC    insulin lispro  0-3 Units SubCUTAneous Nightly    influenza virus vaccine  0.5 mL IntraMUSCular Prior to discharge    baclofen  20 mg Oral Nightly    clonazePAM  1 mg Oral Nightly    sodium chloride flush  10 mL IntraVENous 2 times per day    aspirin  81 mg Oral Daily    mupirocin   Nasal BID    polyethylene glycol  17 g Oral Daily    pantoprazole  40 mg Oral Daily    ipratropium-albuterol  1 ampule Inhalation Q4H WA     Continuous Infusions:   sodium chloride      sodium chloride      sodium chloride      norepinephrine Stopped (12/09/21 1840)    dextrose       CBC:   Recent Labs     12/09/21  0327 12/10/21  0334 12/11/21  0354   WBC 13.2* 9.0 9.2   HGB 10.4* 10.2* 10.9*   PLT See Reflexed IPF Result See Reflexed IPF Result 169     BMP:    Recent Labs     12/09/21  0327 12/09/21  1216 12/09/21  2314 12/10/21  0334 12/11/21  0354     --   --  142 137   K 3.1*   < > 3.8 4.6 4.4   *  --   --  111* 106   CO2 23  --   --  22 21   BUN 7*  --   --  12 23   CREATININE 0.62  --   --  0.63 0.84   GLUCOSE 112*  --   --  106* 100*    < > = values in this interval not displayed. Hepatic: No results for input(s): AST, ALT, ALB, BILITOT, ALKPHOS in the last 72 hours. Troponin: No results for input(s): TROPONINI in the last 72 hours. No results for input(s): TROPONINT in the last 72 hours. BNP: No results for input(s): PROBNP in the last 72 hours. No results for input(s): BNP in the last 72 hours. Lipids: No results for input(s): CHOL, HDL in the last 72 hours. Invalid input(s): LDLCALCU  INR:   Recent Labs     12/09/21  0327 12/10/21  0334 12/11/21  0354   INR 1.1 1.4 1.6       Objective:   Vitals: BP (!) 108/54   Pulse 62   Temp 97.7 °F (36.5 °C) (Oral)   Resp 19   Ht 5' 4\" (1.626 m)   Wt 147 lb 14.9 oz (67.1 kg)   SpO2 95%   BMI 25.39 kg/m²    Last Recorded Weight:  [unfilled]    Constitutional and General Appearance:    alert, cooperative, no distress and appears stated age  Respiratory:  · No for increased work of breathing. · On auscultation: clear to auscultation bilaterally  Cardiovascular:  · The apical impulse is not displaced  · Heart tones are crisp and normal. Regular S1 and S2. No murmurs. Abdomen:   · No masses or tenderness  · Bowel sounds present  Extremities:  ·  No Cyanosis or Clubbing  ·  Lower extremity edema: No  ·  Skin: Warm and dry  Neurological:  · Alert and oriented. Diagnostic Studies:       ECHO: 12/8/21  Summary  Global left ventricular systolic function appears moderately reduced. Estimated ejection fraction is 35 % . Calculated EF via Vazquez's method is 38 %.   Calculated EF via 3D

## 2021-12-11 NOTE — PLAN OF CARE
Problem: Falls - Risk of:  Goal: Will remain free from falls  Description: Will remain free from falls  Outcome: Ongoing  Goal: Absence of physical injury  Description: Absence of physical injury  Outcome: Ongoing     Problem: OXYGENATION/RESPIRATORY FUNCTION  Goal: Patient will maintain patent airway  Outcome: Ongoing  Goal: Patient will achieve/maintain normal respiratory rate/effort  Description: Respiratory rate and effort will be within normal limits for the patient  Outcome: Ongoing     Problem: SKIN INTEGRITY  Goal: Skin integrity is maintained or improved  Outcome: Ongoing     Problem: Discharge Planning:  Goal: Discharged to appropriate level of care  Description: Discharged to appropriate level of care  Outcome: Ongoing     Problem: Cardiac Output - Decreased:  Goal: Cardiac output within specified parameters  Description: Cardiac output within specified parameters  Outcome: Ongoing     Problem: Infection - Surgical Site:  Goal: Will show no infection signs and symptoms  Description: Will show no infection signs and symptoms  Outcome: Ongoing     Problem: Pain - Acute:  Goal: Pain level will decrease  Description: Pain level will decrease  Outcome: Ongoing     Problem: Venous Thromboembolism:  Goal: Will show no signs or symptoms of venous thromboembolism  Description: Will show no signs or symptoms of venous thromboembolism  Outcome: Ongoing  Goal: Absence of signs or symptoms of impaired coagulation  Description: Absence of signs or symptoms of impaired coagulation  Outcome: Ongoing     Problem: Skin Integrity:  Goal: Will show no infection signs and symptoms  Description: Will show no infection signs and symptoms  Outcome: Ongoing  Goal: Absence of new skin breakdown  Description: Absence of new skin breakdown  Outcome: Ongoing     Problem: Pain:  Goal: Pain level will decrease  Description: Pain level will decrease  Outcome: Ongoing  Goal: Control of acute pain  Description: Control of acute pain  Outcome: Ongoing  Goal: Control of chronic pain  Description: Control of chronic pain  Outcome: Ongoing

## 2021-12-11 NOTE — PROGRESS NOTES
Pharmacy Note  Warfarin Consult follow-up      Recent Labs     12/11/21  0354   INR 1.6     Recent Labs     12/09/21  0327 12/10/21  0334 12/11/21  0354   HGB 10.4* 10.2* 10.9*   HCT 33.9* 32.3* 34.4*   PLT See Reflexed IPF Result See Reflexed IPF Result 169       Significant Drug-Drug Interactions:  New warfarin drug-drug interactions: None  Discontinued drug-drug interactions: None    Date INR Dose   12/7/2021 1.2 none   12/8/2021 1.1 7.5mg   12/9/2021 1.1 Dose discontinued   12/10/2021 1.4 HOLD   12/11/2021 1.6 3.75 mg            Notes:                   - Permanent pacemaker placed yesterday  - INR continuing to increase from 7.5 mg dose on 12/8  - Will resume patient's home dosing with 3.75 mg today  - Daily PT/INR while inpatient.        Olya Peña, PharmD, BCCCP  12/11/2021  9:34 AM

## 2021-12-11 NOTE — FLOWSHEET NOTE
Patient tolerated walking the unit at 1000 with Rehab personal, RN, and nursing student. Patient utilized the standard walker with wheels and returned back to the chair. Patient is drowsy, but alert and oriented; wakes up easily. Needs encouragement to cough, deep breathe, and eat/consume fluids. Patient is currently eating lunch with her  at bedside- tolerating food well.
Pt. Admitted to room 1005 from [place] [address], accompanied by CVOR RN x1, Perfusionist, and CRNA Morris Bobby. Received in room by RT, CVICU RN x2. Placed on mechanical ventilation, cardiac monitor, chest tubes to wall suction. See IAF for I&O, VS record.
SPIRITUAL CARE DEPARTMENT - [facility] [address]  PROGRESS NOTE    Shift date: 12/11/21  Shift day: Saturday   Shift # 1    Room # 1005/1005-01   Name: Bria Byrne            Age: 59 y.o. Gender: female          [facility]: [address] [facility]:     Referral: Routine Visit    Admit Date & Time: 12/7/2021  5:16 AM    PATIENT/EVENT DESCRIPTION:  Bria Byrne is a 59 y.o. female         SPIRITUAL ASSESSMENT/INTERVENTION:  Israel Delgado welcomed  presence although appearing to be tired/resting and coping.  was a ministry of presence to Israel Delgado who explained about her Restorationist family praying for her and has their support.  offered to pray with patient which she accepted to nurture hope. SPIRITUAL CARE FOLLOW-UP PLAN:  Chaplains will remain available to offer spiritual and emotional support as needed. Electronically signed by Blade Amaya, on 12/11/2021 at 10:09 AM.  [id number]  [phone]       12/11/21 1007   Encounter Summary   Services provided to: Patient   Referral/Consult From: Rounding   Continue Visiting   (12/11/21)   Complexity of Encounter Low   Length of Encounter 30 minutes   Spiritual Assessment Completed Yes   Spiritual/Alevism   Type Spiritual support   Assessment Hopeful; Coping   Intervention Prayer; Nurtured hope;  Active listening; Sustaining presence/ Ministry of presence   Outcome Expressed gratitude
Cardiac arrest

## 2021-12-11 NOTE — PROCEDURES
[facility]                Procedure Note    Dual Chamber PPM        Ankita Mobley (15 y.o., female)  1960      12/10/2021      Procedure: Complete HB    Operators:  Primary: Kristina Espinal MD    Indication: Complete HB     / Device Data:        Name Implant Date   [employer] 12/10/21   Bety Fleming    [facility] # Serial #   Pacer [id number]   RA-Lead [id number]   RV-Lead [id number] [id number]   LV-Lead N/A N/A       ATRIUM R VENT L VENT   P waves 2.0 mV R waves: 4.2 mV R waves: N/A   Threshold: 1.2/0. 1 Threshold: 1.2/1. 3 Threshold: N/A   Impedance: 490 Impedance: 764 Impedance:  N/A       · [x] Sedation monitoring  · [] Left Subclavian Angiogram   · [x] RV lead   · [x] RA lead   · [] LV lead   · [x] Intra - OP Lead Testing  · [] Coronary Sinus Angiogram   · [x] Pocket   · [x] Generators Implant  · [x] Fluoro time: 2.1 min      Procedure  After the usual preparation of the left neck and chest, the patient was draped in the usual sterile manner. Local anesthesia was infused below the left clavicle from the midline laterally. An incision was made inferior and parallel to the clavicle. The incision was carried down to the fascia. A pocket was formed inferior using blunt dissection. A thin walled 18 gauge needle was used to puncture the left subclavian vein using the modified Seldinger technique. A guide wire was passed into the right heart under fluoroscopic control. This was repeated with a second guide wire. RV Lead Implant:  A 9.0 Liberian sheath was then passed over the guide wire and the guide wire removed. The ventricular lead was advanced through the sheath into the right heart. The lead was then positioned in the right ventricle under fluoroscopic control. The acute pacing and sensing thresholds were measured and found to be satisfactory.         RA Lead Implant:  A second 7.0 Liberian sheath was then passed over the guide wire and the guide wire removed. The atrial lead was advanced into the right heart. The lead was then positioned in the right atrium. The acute pacing and sensing thresholds were measured and found to be acceptable. The two sheaths were removed and the leads were secured to the fascia. Generator: The implanted leads were attached to the pacemaker  using the setscrews. The pocket was irrigated with antibiotic solution. The pulse generator and leads were coiled and placed in the pocket. Fluoroscopy was used to verify the final placement of the pacemaker and leads. The pocket was closed using multiple layers of suture and a dry sterile dressing was applied. There were no complications, patient tolerated the procedure well. The patient left the EP lab in stable condition. Impression / Device:  Successful Implantation of: DC-PPM  Estimated blood loss less than 25 ml    Plan:  Telemetry monitoring  Interrogate pacemaker before discharge  CXR if needed  Wound check at [facility] in 7days  Discharge if patient remains stable    Patient instructed to no showering or get the wound wet for 1 week, no driving for 1 week, no lifting more than 10 pounds for 4 weeks, no arm raising above the shoulder for 4 weeks.  No lovenox or heparin at any dose is to be given    Electronically signed by Patricia Villalba MD on 12/10/2021 at 7:14 PM

## 2021-12-11 NOTE — PROGRESS NOTES
[facility]  Daily Progress Note    Surgeon: Dr. Ray Bangura  S/P: MITRAL VALVE REPLACE, MAZE, CLARIBEL   POD#: 4  EF: 50-55%     Subjective:  Ms. Antwon Randolph feels better today with no acute complaints. Patient is up in the chair. Permanent pacemaker placed yesterday with Cardiology. Sling in place to left arm. She states that her breathing is ok and her pain is tolerable. Chest tubes remain in place to wall suction with decreased output. Will continue to monitor output today and re-evaluate for possible removal later today. Physical Exam  Vital Signs: /73   Pulse 66   Temp 97.7 °F (36.5 °C) (Oral)   Resp 18   Ht 5' 4\" (1.626 m)   Wt 161 lb 13.1 oz (73.4 kg)   SpO2 95%   BMI 27.78 kg/m²  O2 Flow Rate (L/min): 1 L/min   Admit Weight: Weight: 145 lb 8.1 oz (66 kg)   WEIGHTWeight: 161 lb 13.1 oz (73.4 kg)     General: alert and oriented to person, place and time, well-developed and well-nourished, in no acute distress. Heart: Normal S1 and S2.  Regular rhythm. No murmurs, gallops, or rubs. Pacing Wires: Yes - To be clipped prior to discharge  Lungs: clear to auscultation bilaterally and diminished breath sounds bibasilar Chest tubes: Yes, Air Leak: yes  Abdomen: soft, non tender, non distended, BS hypoactive x4  Extremities: Trace edema  Wounds: clean and dry, healing appropriately. Prevena to Sternum.       Sternum: Covered  SVG sites: Covered    Scheduled Meds:    sodium chloride flush  5-40 mL IntraVENous 2 times per day    sodium chloride flush  5-40 mL IntraVENous 2 times per day    midodrine  5 mg Oral TID WC    [Held by provider] metoprolol tartrate  12.5 mg Oral BID    atorvastatin  10 mg Oral Nightly    docusate sodium  100 mg Oral BID    senna  1 tablet Oral Nightly    warfarin (COUMADIN) daily dosing (placeholder)   Other RX Placeholder    insulin lispro  0-6 Units SubCUTAneous TID WC    insulin lispro  0-3 Units SubCUTAneous Nightly    influenza virus vaccine 0.5 mL IntraMUSCular Prior to discharge    baclofen  20 mg Oral Nightly    clonazePAM  1 mg Oral Nightly    sodium chloride flush  10 mL IntraVENous 2 times per day    aspirin  81 mg Oral Daily    mupirocin   Nasal BID    polyethylene glycol  17 g Oral Daily    pantoprazole  40 mg Oral Daily    ipratropium-albuterol  1 ampule Inhalation Q4H WA     Continuous Infusions:    sodium chloride      sodium chloride      sodium chloride      norepinephrine Stopped (12/09/21 1840)    dextrose         Data:  CBC:   Recent Labs     12/09/21  0327 12/10/21  0334 12/11/21  0354   WBC 13.2* 9.0 9.2   HGB 10.4* 10.2* 10.9*   HCT 33.9* 32.3* 34.4*   MCV 97.1 95.0 93.5   PLT See Reflexed IPF Result See Reflexed IPF Result 169     BMP:   Recent Labs     12/09/21 0327 12/09/21  1216 12/09/21  2314 12/10/21  0334 12/11/21  0354     --   --  142 137   K 3.1*   < > 3.8 4.6 4.4   *  --   --  111* 106   CO2 23  --   --  22 21   BUN 7*  --   --  12 23   CREATININE 0.62  --   --  0.63 0.84    < > = values in this interval not displayed. PT/INR:   Recent Labs     12/09/21  0327 12/10/21  0334 12/11/21  0354   PROTIME 12.0 14.6* 16.1*   INR 1.1 1.4 1.6     APTT:   No results for input(s): APTT in the last 72 hours. Chest X-Ray:   ONE XRAY VIEW OF THE CHEST       12/11/2021 5:59 am       COMPARISON:   Chest radiograph 12/10/2021 at 7:16 p.m., chest CT 12/03/2021       HISTORY:   ORDERING SYSTEM PROVIDED HISTORY: Post op CABG   TECHNOLOGIST PROVIDED HISTORY:   Post op CABG   Reason for Exam: Upright CABG       FINDINGS:   Changes of mitral valve replacement and left atrial appendage clipping. Unchanged left-sided chest tubes or mediastinal drains and left subclavian   dual chamber pacemaker.  Overlying heart monitor leads.       Persistence of trace to small bilateral pleural effusions likely larger on   the left with similar appearance of underlying bibasilar airspace opacities.    Decreased bilateral perihilar airspace opacities.  Decreased diffuse   interstitial prominence with persistent indistinct pulmonary vasculature but   no definite interlobular septal thickening.  Persistence of trace to small   biapical pneumothoraces.  Prominence of the pulmonary trunk, a finding that   can be seen with pulmonary hypertension mildly prominent cardiac contour.           Impression   1. Persistent though decreased central predominant interstitial and alveolar   edema suggestive of improved congestive heart failure given recent heart   surgery, mild cardiomegaly, and bilateral pleural effusions. 2. No significant change in trace to small biapical pneumothoraces. 3. Persistent trace to small bilateral pleural effusions with underlying   bibasilar passive atelectasis.  Superimposed pneumonia and aspiration are not   excluded. I/O:  I/O last 3 completed shifts:   In: 400 [P.O.:400]  Out: 1283 [Urine:870; Chest Tube:413]    Assessment/Plan:      Diagnosis Date    Arm pain Right and Left    Atrial fibrillation (HCC)     Atrial fibrillation status post cardioversion [facility])     Atrial flutter (HCC)     [facility] ([facility]     skin    Carotid artery stenosis     Cerebral artery occlusion with cerebral infarction [facility])     Chest pain 11/2003    CHF [facility]estive heart failure) [facility] [address]     Chipped tooth 12/01/2021    Top left    COPD [facility]chronic obstructive pulmonary disease) ([facility]     Depression     Former tobacco use     H/O: CVA (cerebrovascular accident)     1-    Heart palpitations     History of lateral epicondylitis of right elbow     And left elbow    Hx of blood clots     Hyperlipidemia     Hyperparathyroidism ([address]     Hypertensive chronic kidney disease with stage 1 through stage 4 chronic kidney disease, or unspecified chronic kidney disease     Long term current use of anticoagulant     Mitral regurgitation     Mitral valve insufficiency     Mitral valve regurgitation     Muscle spasm     Nerve pain  Osteoporosis     Personal history of other medical treatment     Chronic diastolic congestive heart failure    Radial tunnel syndrome Right radial tunnel release     Restless legs     SOB (shortness of breath) 11/08/2021    Tennis elbow Right and left    Under care of team 12/01/2021    Cardiologist: Morgan Vazquez, last visit 8/2021    Under care of team 12/01/2021    PCP: Dr. Anuj Curran Felch, last visit 6/2021    Under care of team 12/01/2021    Neurology: Dr. Destinee Lockett, last visit 11/2021    Under care of team 12/01/2021    Urology: Dr. Linden Cali, last visit 10/2021    Wears glasses       Neuro: Intact   Lungs: clear, diminished in the bases   HD: VSS. Weaned vasopressors off   Edema: trace/1+ peripheral    GI: Hypoactive bowel sounds X4   : Good urine output- zayas in place    Beta-Blocker: yes- with holding parameters. ASA: yes  Plavix: no  GI: yes  Statin: yes  Coumadin: yes  ACE-I: no  EF: 50-55%     Oxygen as needed to maintain SpO2 > 92%  o Wean as tolerated  o Use Bipap as needed   Chest x-ray daily   Keep Chest Tubes to wall suction  o Monitor output closely   Encourage incentive spirometry, acapella and ambulation    Lasix 20 mg IV daily   Resume Coumadin   Coumadin - dosing per pharmacy (hold)  o INR goal 2.0-3.0  o Will bridge with lovenox   Replace electrolytes as needed per sliding scale and recheck per policy   Case Management consult for discharge planning- home with home care    The above recommendations including medications and orders were discussed and agreed upon with Dr. Jean-Pierre Cruz, the attending on service for the cardiothoracic surgery group today.      Electronically signed by SARAH Snow CNP on 12/11/2021 at 9:35 AM    On this date 12/11/2021 I have spent 34 minutes reviewing previous notes, test results and face to face with the patient discussing the diagnosis and importance of compliance with the treatment plan as well as documenting on the day of the visit. At least 50% of the time documented was spent with the patient to provide counseling and/or coordination of care. This note was created with the assistance of a speech-recognition program.  Although the intention is to generate a document that actually reflects the content of the visit, no guarantees can be provided that every mistake has been identified and corrected by editing. Note was updated later by me after  physical examination and  completion of the assessment.

## 2021-12-11 NOTE — PROGRESS NOTES
Physical Therapy  Facility/Department: [facility]  Daily Treatment Note  NAME: Marion Flowers  : 1960  MRN: [id number]    Date of Service: 2021    Discharge Recommendations:  Patient would benefit from continued therapy after discharge   PT Equipment Recommendations  Equipment Needed: No (will CTA as the pt progresses.)    Assessment   Body structures, Functions, Activity limitations: Decreased functional mobility ; Decreased posture; Decreased endurance; Decreased ROM; Decreased strength; Decreased balance; Decreased safe awareness  Assessment: Pt with mobility deficits requiring min-A to perform bed mobility, CGA/min A to perform sit<>stand and stand<>pivot transfer with no device. Educated not to use L UE to psuh/pull/lift at his time. Pt limited this date secondary to decreased endurance, decreased bilateral LE strength, and decreased trunk control. Pt also having nausa today. Pt would benefit from additional therapy upon discharge to assist in return to prior level of functional independence. Prognosis: Good  Decision Making: High Complexity  REQUIRES PT FOLLOW UP: Yes  Activity Tolerance  Activity Tolerance: Patient limited by endurance; Patient limited by fatigue     Patient Diagnosis(es): There were no encounter diagnoses.      has a past medical history of Arm pain, Atrial fibrillation (HCC), Atrial fibrillation status post cardioversion [facility]), Atrial flutter ([facility] [address] Cancer ([facility] [address] Carotid artery stenosis, Cerebral artery occlusion with cerebral infarction [facility]), Chest pain, CHF (congestive heart failure) ([facility] [address] Chipped tooth, COPD (chronic obstructive pulmonary disease) ([facility] [address] Depression, Former tobacco use, H/O: CVA (cerebrovascular accident), Heart palpitations, History of lateral epicondylitis of right elbow, Hx of blood clots, Hyperlipidemia, Hyperparathyroidism ([facility] [address] Hypertensive chronic kidney disease with stage 1 through stage 4 chronic kidney disease, or unspecified chronic kidney disease, Long term current use of anticoagulant, Mitral regurgitation, Mitral valve insufficiency, Mitral valve regurgitation, Muscle spasm, Nerve pain, Osteoporosis, Personal history of other medical treatment, Radial tunnel syndrome, Restless legs, SOB (shortness of breath), Tennis elbow, Under care of team, Under care of team, Under care of team, Under care of team, and Wears glasses. has a past surgical history that includes  section; Wrist ganglion excision (Right); Cardioversion (2016); Cardioversion (2019); Colonoscopy; ablation of dysrhythmic focus (2020); transesophageal echocardiogram (10/05/2021); Cardiac catheterization (2021); Endoscopy, colon, diagnostic; eye surgery; skin biopsy; [place] tooth extraction; and Mitral valve repair (N/A, 2021). Restrictions  Restrictions/Precautions  Restrictions/Precautions: Fall Risk (PPM 12/10/21)  Required Braces or Orthoses?: Yes  Required Braces or Orthoses  Other: Heart Hugger Brace  Left Upper Extremity Brace/Splint: Sling  Left Upper Extremity Brace/Splint: Sling 2* pace maker  Position Activity Restriction  Sternal Precautions: 5# Lifting Restrictions (Do not elevate affected arm above shoulder for 30 days)  Other position/activity restrictions: ambulate pt, up in a chair for meals, chest tubes, 1 to 1.5 Lt NC, MVR ,  PPM 12/10/21-Do not elevate affected arm above shoulder for 30 days  4) Instruct patient to keep wound clean and dry. No showering for one week Do not elevate affected arm above shoulder for 30 days  4) Instruct patient to keep wound clean and dry. No showering for one week. Subjective   General  Response To Previous Treatment: Not applicable  Family / Caregiver Present: Yes ()  Subjective  Subjective: Pt up in a recliner OK per RN to work with pt.    Pain Screening  Patient Currently in Pain: Denies  Pain Assessment  Clinical Progression: Not changed  Response to Pain Intervention: Asleep with RR greater than 10  POSS Score (Patient Ctrl Analgesia): 1  Vital Signs  BP Location: Right upper arm  Level of Consciousness: Alert (0)  Patient Currently in Pain: Denies  Oxygen Therapy  SpO2: 95 %  Pulse Oximeter Device Mode: Continuous  O2 Device: Nasal cannula  O2 Flow Rate (L/min): 1.5 L/min       Orientation     Cognition      Objective      Transfers  Sit to Stand: Contact guard assistance; Minimal Assistance (I/S not to use L UE to push off)  Stand to sit: Contact guard assistance; Minimal Assistance  Stand Pivot Transfers: Contact guard assistance  Comment: Increased time/effort to perform. Ambulation  Ambulation?: No  More Ambulation?: No  Ambulation 1  Surface: level tile  Device: Rolling Walker  Other Apparatus: O2 (1.5 lt, Chest ube)  Assistance: Contact guard assistance  Quality of Gait: Pt very weak, c/o of nauseat , limiting activity, slow danny. Sao2 94% and HR 71 with activity. Distance: 65 ft  Comments: Needds assist to Jeni walker  Stairs/Curb  Stairs?: No     Balance  Posture: Fair  Sitting - Static: Fair; +  Sitting - Dynamic: Fair  Standing - Static: Fair (RW)  Standing - Dynamic: Fair; -  Comments: sitting balance assessed at the EOB. Other exercises  Other exercises?: Yes  Other exercises 1: Stanidng tolerance for 2 minutes, weigh shigting, mwarching and leg kicks -standing with support of rolling walker. AROM RLE (degrees)  RLE AROM: WFL  AROM LLE (degrees)  LLE AROM : WFL  Strength RLE  Strength RLE: WFL  Comment: Grossly 4/5  Strength LLE  Strength LLE: WFL  Comment: Grossly 4/5                 G-Code     OutComes Score                                                     AM-PAC Score             Goals  Short term goals  Time Frame for Short term goals: 14 visits  Short term goal 1: Pt will ambulate 300 feet with least restrictive device and supervision to increase functional independence.   Short term goal 2: Pt will tolerate a 30 minute therapy session to promote increased endurance. Short term goal 3: Pt will negotiate 1 stair with no handrail and SBA to allow the pt to enter prior living arrangements. Short term goal 4: Pt will demonstrate good- standing balance to decrease fall risk. Short term goal 5: Pt will perform sit<>stand transfer with supervision to increase functional independence.     Plan    Plan  Times per week: 6-7  Times per day:  (1-2x/day)  Current Treatment Recommendations: Strengthening, Transfer Training, Endurance Training, ROM, Balance Training, Gait Training, Functional Mobility Training, Stair training, Safety Education & Training, Home Exercise Program, Equipment Evaluation, Education, & procurement, Patient/Caregiver Education & Training  Safety Devices  Type of devices: Patient at risk for falls, Left in chair, Call light within reach, Gait belt, Nurse notified  Restraints  Initially in place: No     Therapy Time   Individual Concurrent Group Co-treatment   Time In 0947         Time Out 1020         Minutes [address], PT

## 2021-12-11 NOTE — PROCEDURES
[place] [facility]                Procedure Note    Dual Chamber PPM        Bria Byrne (3 y.o., female)  1960      12/10/2021      Procedure: Complete HB    Operators:  Primary: Brayden Christian MD    Indication: Complete HB     / Device Data:        Name Implant Date   [employer] 12/10/21   Christian Starks    [facility] # Serial #   Pacer [id number]   RA-Lead [id number]   RV-Lead [id number] [id number]   LV-Lead N/A N/A       ATRIUM R VENT L VENT   P waves 2.0 mV R waves: 4.2 mV R waves: N/A   Threshold: 1.2/0. 1 Threshold: 1.2/1. 3 Threshold: N/A   Impedance: 490 Impedance: 764 Impedance:  N/A       [x] Sedation monitoring  [] Left Subclavian Angiogram   [x] RV lead   [x] RA lead   [] LV lead   [x] Intra - OP Lead Testing  [] Coronary Sinus Angiogram   [x] Pocket   [x] Generators Implant  [x] Fluoro time: 2.1 min      Procedure  After the usual preparation of the left neck and chest, the patient was draped in the usual sterile manner. Local anesthesia was infused below the left clavicle from the midline laterally. An incision was made inferior and parallel to the clavicle. The incision was carried down to the fascia. A pocket was formed inferior using blunt dissection. A thin walled 18 gauge needle was used to puncture the left subclavian vein using the modified Seldinger technique. A guide wire was passed into the right heart under fluoroscopic control. This was repeated with a second guide wire. RV Lead Implant:  A 9.0 Jamaican sheath was then passed over the guide wire and the guide wire removed. The ventricular lead was advanced through the sheath into the right heart. The lead was then positioned in the right ventricle under fluoroscopic control. The acute pacing and sensing thresholds were measured and found to be satisfactory.         RA Lead Implant:  A second 7.0 Jamaican sheath was then passed over the guide wire and the guide wire removed. The atrial lead was advanced into the right heart. The lead was then positioned in the right atrium. The acute pacing and sensing thresholds were measured and found to be acceptable. The two sheaths were removed and the leads were secured to the fascia. Generator: The implanted leads were attached to the pacemaker  using the setscrews. The pocket was irrigated with antibiotic solution. The pulse generator and leads were coiled and placed in the pocket. Fluoroscopy was used to verify the final placement of the pacemaker and leads. The pocket was closed using multiple layers of suture and a dry sterile dressing was applied. There were no complications, patient tolerated the procedure well. The patient left the EP lab in stable condition. Impression / Device:  Successful Implantation of: DC-PPM  Estimated blood loss less than 25 ml    Plan:  Telemetry monitoring  Interrogate pacemaker before discharge  CXR if needed  Wound check at [facility] in 7days  Discharge if patient remains stable    Patient instructed to no showering or get the wound wet for 1 week, no driving for 1 week, no lifting more than 10 pounds for 4 weeks, no arm raising above the shoulder for 4 weeks. No lovenox or heparin at any dose is to be given    Electronically signed by Luanne Bui MD on 12/10/2021 at 7:14 PM      I have reviewed the case / procedure with resident / fellow  I have examined the patient personally  Patient agree with treatment plan as discussed before, final arrangement based on my evaluation and exam.    Risk and benefit of procedure planned were explained in details. Procedure was performed by me personally, with all aspect of the procedure being done using standard protocol. Note was modified based on my own assessment and treatment.     Chichi Jane MD  [facility]

## 2021-12-12 ENCOUNTER — APPOINTMENT (OUTPATIENT)
Dept: GENERAL RADIOLOGY | Age: 61
DRG: 220 | End: 2021-12-12
Attending: THORACIC SURGERY (CARDIOTHORACIC VASCULAR SURGERY)
Payer: MEDICARE

## 2021-12-12 LAB
ANION GAP SERPL CALCULATED.3IONS-SCNC: 14 MMOL/L (ref 9–17)
BUN BLDV-MCNC: 28 MG/DL (ref 8–23)
BUN/CREAT BLD: ABNORMAL (ref 9–20)
CALCIUM SERPL-MCNC: 8.6 MG/DL (ref 8.6–10.4)
CHLORIDE BLD-SCNC: 101 MMOL/L (ref 98–107)
CO2: 20 MMOL/L (ref 20–31)
CREAT SERPL-MCNC: 0.93 MG/DL (ref 0.5–0.9)
GFR AFRICAN AMERICAN: >60 ML/MIN
GFR NON-AFRICAN AMERICAN: >60 ML/MIN
GFR SERPL CREATININE-BSD FRML MDRD: ABNORMAL ML/MIN/{1.73_M2}
GFR SERPL CREATININE-BSD FRML MDRD: ABNORMAL ML/MIN/{1.73_M2}
GLUCOSE BLD-MCNC: 103 MG/DL (ref 65–105)
GLUCOSE BLD-MCNC: 75 MG/DL (ref 65–105)
GLUCOSE BLD-MCNC: 86 MG/DL (ref 65–105)
GLUCOSE BLD-MCNC: 89 MG/DL (ref 70–99)
GLUCOSE BLD-MCNC: 96 MG/DL (ref 65–105)
HCT VFR BLD CALC: 34.3 % (ref 36.3–47.1)
HEMOGLOBIN: 10.9 G/DL (ref 11.9–15.1)
INR BLD: 1.8
MAGNESIUM: 2.4 MG/DL (ref 1.6–2.6)
MCH RBC QN AUTO: 30.1 PG (ref 25.2–33.5)
MCHC RBC AUTO-ENTMCNC: 31.8 G/DL (ref 28.4–34.8)
MCV RBC AUTO: 94.8 FL (ref 82.6–102.9)
NRBC AUTOMATED: 0.6 PER 100 WBC
PDW BLD-RTO: 16 % (ref 11.8–14.4)
PLATELET # BLD: 149 K/UL (ref 138–453)
PMV BLD AUTO: 12 FL (ref 8.1–13.5)
POTASSIUM SERPL-SCNC: 3.8 MMOL/L (ref 3.7–5.3)
PROTHROMBIN TIME: 18.5 SEC (ref 9.1–12.3)
RBC # BLD: 3.62 M/UL (ref 3.95–5.11)
SODIUM BLD-SCNC: 135 MMOL/L (ref 135–144)
WBC # BLD: 8.8 K/UL (ref 3.5–11.3)

## 2021-12-12 PROCEDURE — 82947 ASSAY GLUCOSE BLOOD QUANT: CPT

## 2021-12-12 PROCEDURE — 97110 THERAPEUTIC EXERCISES: CPT

## 2021-12-12 PROCEDURE — 71045 X-RAY EXAM CHEST 1 VIEW: CPT

## 2021-12-12 PROCEDURE — 6370000000 HC RX 637 (ALT 250 FOR IP): Performed by: NURSE PRACTITIONER

## 2021-12-12 PROCEDURE — 2140000001 HC CVICU INTERMEDIATE R&B

## 2021-12-12 PROCEDURE — 99024 POSTOP FOLLOW-UP VISIT: CPT | Performed by: NURSE PRACTITIONER

## 2021-12-12 PROCEDURE — 2580000003 HC RX 258: Performed by: NURSE PRACTITIONER

## 2021-12-12 PROCEDURE — 94640 AIRWAY INHALATION TREATMENT: CPT

## 2021-12-12 PROCEDURE — 36415 COLL VENOUS BLD VENIPUNCTURE: CPT

## 2021-12-12 PROCEDURE — APPNB45 APP NON BILLABLE 31-45 MINUTES: Performed by: NURSE PRACTITIONER

## 2021-12-12 PROCEDURE — 83735 ASSAY OF MAGNESIUM: CPT

## 2021-12-12 PROCEDURE — 6370000000 HC RX 637 (ALT 250 FOR IP): Performed by: THORACIC SURGERY (CARDIOTHORACIC VASCULAR SURGERY)

## 2021-12-12 PROCEDURE — 2700000000 HC OXYGEN THERAPY PER DAY

## 2021-12-12 PROCEDURE — 97116 GAIT TRAINING THERAPY: CPT

## 2021-12-12 PROCEDURE — 80048 BASIC METABOLIC PNL TOTAL CA: CPT

## 2021-12-12 PROCEDURE — 6360000002 HC RX W HCPCS: Performed by: NURSE PRACTITIONER

## 2021-12-12 PROCEDURE — 85610 PROTHROMBIN TIME: CPT

## 2021-12-12 PROCEDURE — 85027 COMPLETE CBC AUTOMATED: CPT

## 2021-12-12 RX ORDER — GABAPENTIN 400 MG/1
800 CAPSULE ORAL NIGHTLY
Status: DISCONTINUED | OUTPATIENT
Start: 2021-12-12 | End: 2021-12-14 | Stop reason: HOSPADM

## 2021-12-12 RX ORDER — WARFARIN SODIUM 2.5 MG/1
2.5 TABLET ORAL
Status: COMPLETED | OUTPATIENT
Start: 2021-12-12 | End: 2021-12-12

## 2021-12-12 RX ADMIN — FUROSEMIDE 20 MG: 10 INJECTION, SOLUTION INTRAMUSCULAR; INTRAVENOUS at 08:34

## 2021-12-12 RX ADMIN — SODIUM CHLORIDE, PRESERVATIVE FREE 10 ML: 5 INJECTION INTRAVENOUS at 08:49

## 2021-12-12 RX ADMIN — CLONAZEPAM 1 MG: 1 TABLET ORAL at 21:11

## 2021-12-12 RX ADMIN — POTASSIUM CHLORIDE 40 MEQ: 1500 TABLET, EXTENDED RELEASE ORAL at 06:05

## 2021-12-12 RX ADMIN — GABAPENTIN 800 MG: 400 CAPSULE ORAL at 21:18

## 2021-12-12 RX ADMIN — BACLOFEN 20 MG: 10 TABLET ORAL at 21:07

## 2021-12-12 RX ADMIN — POLYETHYLENE GLYCOL 3350 17 G: 17 POWDER, FOR SOLUTION ORAL at 08:44

## 2021-12-12 RX ADMIN — DOCUSATE SODIUM 100 MG: 100 CAPSULE ORAL at 08:34

## 2021-12-12 RX ADMIN — MIDODRINE HYDROCHLORIDE 5 MG: 5 TABLET ORAL at 12:30

## 2021-12-12 RX ADMIN — IPRATROPIUM BROMIDE AND ALBUTEROL SULFATE 1 AMPULE: .5; 2.5 SOLUTION RESPIRATORY (INHALATION) at 11:53

## 2021-12-12 RX ADMIN — DIPHENHYDRAMINE HCL 25 MG: 25 TABLET ORAL at 21:12

## 2021-12-12 RX ADMIN — IPRATROPIUM BROMIDE AND ALBUTEROL SULFATE 1 AMPULE: .5; 2.5 SOLUTION RESPIRATORY (INHALATION) at 08:01

## 2021-12-12 RX ADMIN — OXYCODONE HYDROCHLORIDE AND ACETAMINOPHEN 2 TABLET: 5; 325 TABLET ORAL at 21:12

## 2021-12-12 RX ADMIN — ATORVASTATIN CALCIUM 10 MG: 10 TABLET, FILM COATED ORAL at 21:07

## 2021-12-12 RX ADMIN — MIDODRINE HYDROCHLORIDE 5 MG: 5 TABLET ORAL at 08:34

## 2021-12-12 RX ADMIN — ONDANSETRON 4 MG: 2 INJECTION INTRAMUSCULAR; INTRAVENOUS at 21:12

## 2021-12-12 RX ADMIN — Medication 81 MG: at 08:34

## 2021-12-12 RX ADMIN — MIDODRINE HYDROCHLORIDE 5 MG: 5 TABLET ORAL at 17:15

## 2021-12-12 RX ADMIN — IPRATROPIUM BROMIDE AND ALBUTEROL SULFATE 1 AMPULE: .5; 2.5 SOLUTION RESPIRATORY (INHALATION) at 16:03

## 2021-12-12 RX ADMIN — IPRATROPIUM BROMIDE AND ALBUTEROL SULFATE 1 AMPULE: .5; 2.5 SOLUTION RESPIRATORY (INHALATION) at 19:41

## 2021-12-12 RX ADMIN — WARFARIN SODIUM 2.5 MG: 2.5 TABLET ORAL at 17:15

## 2021-12-12 RX ADMIN — PANTOPRAZOLE SODIUM 40 MG: 40 TABLET, DELAYED RELEASE ORAL at 08:34

## 2021-12-12 ASSESSMENT — PAIN SCALES - GENERAL
PAINLEVEL_OUTOF10: 0
PAINLEVEL_OUTOF10: 8
PAINLEVEL_OUTOF10: 8
PAINLEVEL_OUTOF10: 0

## 2021-12-12 ASSESSMENT — PAIN DESCRIPTION - PAIN TYPE
TYPE: SURGICAL PAIN
TYPE: SURGICAL PAIN

## 2021-12-12 ASSESSMENT — PAIN DESCRIPTION - LOCATION
LOCATION: INCISION
LOCATION: INCISION

## 2021-12-12 NOTE — PROGRESS NOTES
[facility]   Progress Note                    Date:   12/12/2021  Patient name:  Khoa Sanches  Date of admission:  12/7/2021  5:16 AM  MRN:   [id number]  YOB: 1960  PCP:    Berlinda Boast, MD    Reason for Admission:  Severe mitral regurgitation [I34.0]    Subjective:      Clinical Changes / Abnormalities:    Patient seen and examined at bedside. No acute events overnight. No chest pain or shortness of breath.       Urine output in the last 24 hours:     Intake/Output Summary (Last 24 hours) at 12/12/2021 0719  Last data filed at 12/12/2021 0600  Gross per 24 hour   Intake --   Output 1870 ml   Net -1870 ml     I/O since admission: -777 cc    Medications:   Scheduled Meds:   furosemide  20 mg IntraVENous Daily    sodium chloride flush  5-40 mL IntraVENous 2 times per day    sodium chloride flush  5-40 mL IntraVENous 2 times per day    midodrine  5 mg Oral TID WC    [Held by provider] metoprolol tartrate  12.5 mg Oral BID    atorvastatin  10 mg Oral Nightly    docusate sodium  100 mg Oral BID    senna  1 tablet Oral Nightly    warfarin (COUMADIN) daily dosing (placeholder)   Other RX Placeholder    insulin lispro  0-6 Units SubCUTAneous TID WC    insulin lispro  0-3 Units SubCUTAneous Nightly    influenza virus vaccine  0.5 mL IntraMUSCular Prior to discharge    baclofen  20 mg Oral Nightly    clonazePAM  1 mg Oral Nightly    sodium chloride flush  10 mL IntraVENous 2 times per day    aspirin  81 mg Oral Daily    mupirocin   Nasal BID    polyethylene glycol  17 g Oral Daily    pantoprazole  40 mg Oral Daily    ipratropium-albuterol  1 ampule Inhalation Q4H WA     Continuous Infusions:   sodium chloride      sodium chloride      sodium chloride      norepinephrine Stopped (12/09/21 1840)    dextrose       CBC:   Recent Labs     12/10/21  0334 12/11/21  0354 12/12/21  0325   WBC 9.0 9.2 8.8   HGB 10.2* 10.9* 10.9*   PLT See Reflexed IPF Result 169 149     BMP: Recent Labs     12/10/21  0334 12/11/21 0354 12/12/21 0325    137 135   K 4.6 4.4 3.8   * 106 101   CO2 22 21 20   BUN 12 23 28*   CREATININE 0.63 0.84 0.93*   GLUCOSE 106* 100* 89     Hepatic: No results for input(s): AST, ALT, ALB, BILITOT, ALKPHOS in the last 72 hours. Troponin: No results for input(s): TROPONINI in the last 72 hours. No results for input(s): TROPONINT in the last 72 hours. BNP: No results for input(s): PROBNP in the last 72 hours. No results for input(s): BNP in the last 72 hours. Lipids: No results for input(s): CHOL, HDL in the last 72 hours. Invalid input(s): LDLCALCU  INR:   Recent Labs     12/10/21  0334 12/11/21  0354 12/12/21  0325   INR 1.4 1.6 1.8       Objective:   Vitals: BP (!) 102/41   Pulse 64   Temp 98.2 °F (36.8 °C) (Oral)   Resp 14   Ht 5' 4\" (1.626 m)   Wt 162 lb 14.7 oz (73.9 kg)   SpO2 97%   BMI 27.97 kg/m²    Constitutional and General Appearance:   · alert, cooperative, no distress and appears stated age  Respiratory:  · No for increased work of breathing. · On auscultation: clear to auscultation bilaterally  Cardiovascular:  · The apical impulse is not displaced  · Heart tones are crisp and normal. Regular S1 and S2. No murmurs. Abdomen:   · No masses or tenderness  · Bowel sounds present  Extremities:  ·  No Cyanosis or Clubbing  ·  Lower extremity edema: No  ·  Skin: Warm and dry  Neurological:  · Alert and oriented.        Diagnostic Studies:         ECHO: 12/8/21  Summary  Global left ventricular systolic function appears moderately reduced. Estimated ejection fraction is 35 % . Calculated EF via Vazquez's method is 38 %. Calculated EF via 3D Heart Model is 33 %. Leftward compression of inter-ventricular septum (\"D-sign\") indicating RV  pressure/volume overload. The right ventricle appears mildly dilated. Aortic valve structure and function normal.  Aortic valve is trileaflet. Trivial aortic insufficiency.   No aortic stenosis. A 27 mm Magna Ease bioprosthetic valve is seen in the mitral position. At least mild mitral regurgitation. Normal tricuspid valve leaflets. Moderate to severe tricuspid regurgitation. Mild pulmonary hypertension with an estimated right ventricular systolic  pressure of 46 mmHg. No pericardial effusion.     Cardiac Angiography: 11/8/21  Cardiac Arteries and Lesion Findings     LMCA: Normal 0% stenosis.     LAD: Normal 0% stenosis.     LCx: Normal 0% stenosis.     RCA: Normal 0% stenosis.      Coronary Tree      Dominance: Right     LV Analysis  LV function assessed as:Normal.           Assessment / Acute Cardiac Problems:   1. Severe MR s/p  mitral valve replacement with Magna Ease + maze + CLARIBEL  2. Moderate tp severe TR  3. Intermittent Complete heart block s/p permanent pacemaker implantation on 12/10/21  4. Paroxsymal afib/a flutter, hx of cardivoersion, failed cryoablation with Dr Trudy Abernathy on 6/22/2020  5. LAURA  6. CKD  7. Hx of CVA     Plan of Treatment:   1. Continue ASA and lipitor  2. Continue Lopressor 12.5 mg BID when BP can tolerate  3. Continue warfarin. Pharmacy to dose. 4. CV surgery on board.   5. K>4, Mg>2    Artur Sparks MD  Fellow, [id number] [facility]

## 2021-12-12 NOTE — PROGRESS NOTES
[facility]  Daily Progress Note    Surgeon: Dr. Sun Sprague  S/P: MITRAL VALVE REPLACE, MAZE, CLARIBEL   POD#: 5  EF: 50-55%     Subjective:  Ms. Abdi Alexander feels better today with no acute complaints. Patient is up in the chair. Permanent pacemaker placed on POD 3. Sling in place to left arm. She states that her breathing is ok and her pain is tolerable. Chest tubes remain in place to wall suction with decreased output. Will continue to monitor output today and re-evaluate for possible removal later today. Physical Exam  Vital Signs: BP (!) 94/55   Pulse 64   Temp 98.8 °F (37.1 °C) (Axillary)   Resp 12   Ht 5' 4\" (1.626 m)   Wt 162 lb 14.7 oz (73.9 kg)   SpO2 97%   BMI 27.97 kg/m²  O2 Flow Rate (L/min): 2 L/min   Admit Weight: Weight: 145 lb 8.1 oz (66 kg)   WEIGHTWeight: 162 lb 14.7 oz (73.9 kg)     General: alert and oriented to person, place and time, well-developed and well-nourished, in no acute distress. Heart: Normal S1 and S2.  Regular rhythm. No murmurs, gallops, or rubs. Pacing Wires: Yes - To be clipped prior to discharge  Lungs: clear to auscultation bilaterally and diminished breath sounds bibasilar Chest tubes: Yes, Air Leak: yes  Abdomen: soft, non tender, non distended, BS hypoactive x4  Extremities: Trace edema  Wounds: clean and dry, healing appropriately. Prevena to Sternum.       Sternum: Covered  SVG sites: Covered    Scheduled Meds:    furosemide  20 mg IntraVENous Daily    sodium chloride flush  5-40 mL IntraVENous 2 times per day    sodium chloride flush  5-40 mL IntraVENous 2 times per day    midodrine  5 mg Oral TID WC    [Held by provider] metoprolol tartrate  12.5 mg Oral BID    atorvastatin  10 mg Oral Nightly    docusate sodium  100 mg Oral BID    senna  1 tablet Oral Nightly    warfarin (COUMADIN) daily dosing (placeholder)   Other RX Placeholder    insulin lispro  0-6 Units SubCUTAneous TID WC    insulin lispro  0-3 Units SubCUTAneous Nightly  influenza virus vaccine  0.5 mL IntraMUSCular Prior to discharge    baclofen  20 mg Oral Nightly    clonazePAM  1 mg Oral Nightly    sodium chloride flush  10 mL IntraVENous 2 times per day    aspirin  81 mg Oral Daily    polyethylene glycol  17 g Oral Daily    pantoprazole  40 mg Oral Daily    ipratropium-albuterol  1 ampule Inhalation Q4H WA     Continuous Infusions:    sodium chloride      sodium chloride      sodium chloride      norepinephrine Stopped (12/09/21 1840)    dextrose         Data:  CBC:   Recent Labs     12/10/21  0334 12/11/21 0354 12/12/21 0325   WBC 9.0 9.2 8.8   HGB 10.2* 10.9* 10.9*   HCT 32.3* 34.4* 34.3*   MCV 95.0 93.5 94.8   PLT See Reflexed IPF Result 169 149     BMP:   Recent Labs     12/10/21  0334 12/11/21  0354 12/12/21  0325    137 135   K 4.6 4.4 3.8   * 106 101   CO2 22 21 20   BUN 12 23 28*   CREATININE 0.63 0.84 0.93*     PT/INR:   Recent Labs     12/10/21  0334 12/11/21  0354 12/12/21  0325   PROTIME 14.6* 16.1* 18.5*   INR 1.4 1.6 1.8     APTT:   No results for input(s): APTT in the last 72 hours. Chest X-Ray:   ONE XRAY VIEW OF THE CHEST       12/12/2021 6:16 am       COMPARISON:   12/11/2021       HISTORY:   ORDERING SYSTEM PROVIDED HISTORY: Post op CABG   TECHNOLOGIST PROVIDED HISTORY:   Post op CABG   Reason for Exam: uprt       FINDINGS:   Bilateral pleural effusions are present, small to moderate in size without   significant change.  There is bilateral basilar atelectasis.  Heart is   prominent in size.  There are changes of prior cardiac valve replacement and   left atrial appendage clip.  Dual lead left pacemaker is noted.  No evidence   of pneumothorax.  Osseous structures are stable.           Impression   Small bilateral pleural effusions and passive basilar atelectasis.  Postop   changes as described.  No significant interval change. I/O:  I/O last 3 completed shifts:  In: -   Out: 1870 [Urine:1500;  Chest Tube:370]    Assessment/Plan:      Diagnosis Date    Arm pain Right and Left    Atrial fibrillation (HCC)     Atrial fibrillation status post cardioversion [facility])     Atrial flutter (HCC)     Cancer (HCC)     skin    Carotid artery stenosis     Cerebral artery occlusion with cerebral infarction [facility])     Chest pain 11/2003    CHF [facility]congestive heart failure) [address] [address]     Chipped tooth 12/01/2021    Top left    COPD (chronic obstructive pulmonary disease) ([facility]     Depression     Former tobacco use     H/O: CVA (cerebrovascular accident)     1-    Heart palpitations     History of lateral epicondylitis of right elbow     And left elbow    Hx of blood clots     Hyperlipidemia     Hyperparathyroidism ([facility] [address]     Hypertensive chronic kidney disease with stage 1 through stage 4 chronic kidney disease, or unspecified chronic kidney disease     Long term current use of anticoagulant     Mitral regurgitation     Mitral valve insufficiency     Mitral valve regurgitation     Muscle spasm     Nerve pain     Osteoporosis     Personal history of other medical treatment     Chronic diastolic congestive heart failure    Radial tunnel syndrome Right radial tunnel release     Restless legs     SOB (shortness of breath) 11/08/2021    Tennis elbow Right and left    Under care of team 12/01/2021    Cardiologist: Romel Brock, last visit 8/2021    Under care of team 12/01/2021    PCP: Dr. Bryan Bartlett [place], last visit 6/2021    Under care of team 12/01/2021    Neurology: Dr. Katherin Muniz, last visit 11/2021    Under care of team 12/01/2021    Urology: Dr. Valeriano Cochran, last visit 10/2021    Wears glasses       Neuro: Intact   Lungs: clear, diminished in the bases   HD: VSS. Weaned vasopressors off   Edema: trace/1+ peripheral    GI: Hypoactive bowel sounds X4   : Good urine output- zayas in place    Beta-Blocker: yes- with holding parameters.    ASA: yes  Plavix: no  GI: yes  Statin: yes  Coumadin: yes  ACE-I: no  EF: 50-55%     Oxygen as needed to maintain SpO2 > 92%  o Wean as tolerated  o Use Bipap as needed   Chest x-ray daily   Keep Chest Tubes to wall suction  o Monitor output closely   Encourage incentive spirometry, acapella and ambulation    Lasix 20 mg IV daily   Coumadin - dosing per pharmacy (hold)  o INR goal 2.0-3.0  o Will bridge with lovenox   Replace electrolytes as needed per sliding scale and recheck per policy   Case Management consult for discharge planning- home with home care    The above recommendations including medications and orders were discussed and agreed upon with Dr. Ray Bangura, the attending on service for the cardiothoracic surgery group today. Electronically signed by SARAH Quarles CNP on 12/12/2021 at 9:01 AM    On this date 12/12/2021 I have spent 34 minutes reviewing previous notes, test results and face to face with the patient discussing the diagnosis and importance of compliance with the treatment plan as well as documenting on the day of the visit. At least 50% of the time documented was spent with the patient to provide counseling and/or coordination of care. This note was created with the assistance of a speech-recognition program.  Although the intention is to generate a document that actually reflects the content of the visit, no guarantees can be provided that every mistake has been identified and corrected by editing. Note was updated later by me after  physical examination and  completion of the assessment.

## 2021-12-12 NOTE — PROGRESS NOTES
Pharmacy Note  Warfarin Consult follow-up      Recent Labs     12/12/21  0325   INR 1.8     Recent Labs     12/10/21  0334 12/11/21  0354 12/12/21  0325   HGB 10.2* 10.9* 10.9*   HCT 32.3* 34.4* 34.3*   PLT See Reflexed IPF Result 169 149       Significant Drug-Drug Interactions:  New warfarin drug-drug interactions: None  Discontinued drug-drug interactions: None    Date INR Dose   12/7/2021 1.2 none   12/8/2021 1.1 7.5mg   12/9/2021 1.1 Dose discontinued   12/10/2021 1.4 HOLD   12/11/2021 1.6 3.75 mg   12/12/2021 1.8 2.5 mg     Notes:                   - INR continues to up-trend at an appropriate rate  - Will continue with home dosing and give 2.5 mg today  - Daily PT/INR while inpatient.      Homero Young, PharmD, BCCCP  12/12/2021  9:02 AM

## 2021-12-12 NOTE — PLAN OF CARE
Problem: Falls - Risk of:  Goal: Will remain free from falls  Description: Will remain free from falls  12/12/2021 1400 by Vinay Velazquez RN  Outcome: Ongoing  12/12/2021 0150 by Nehemiah Parker RN  Outcome: Ongoing  Goal: Absence of physical injury  Description: Absence of physical injury  12/12/2021 1400 by Vinay Velazquez RN  Outcome: Ongoing  12/12/2021 0150 by Nehemiah Parker RN  Outcome: Ongoing     Problem: OXYGENATION/RESPIRATORY FUNCTION  Goal: Patient will maintain patent airway  12/12/2021 1400 by Vinay Velazquez RN  Outcome: Ongoing  12/12/2021 0605 by Gerardo Medrano RCP  Outcome: Ongoing  12/12/2021 0150 by Nehemiah Parker RN  Outcome: Ongoing  Goal: Patient will achieve/maintain normal respiratory rate/effort  Description: Respiratory rate and effort will be within normal limits for the patient  12/12/2021 1400 by Vinay Velazquez RN  Outcome: Ongoing  12/12/2021 0605 by Gerardo Medrano RCP  Outcome: Ongoing  12/12/2021 0150 by Nehemiah Parker RN  Outcome: Ongoing     Problem: SKIN INTEGRITY  Goal: Skin integrity is maintained or improved  12/12/2021 1400 by Vinay Velazquez RN  Outcome: Ongoing  12/12/2021 0150 by Nehemiah Parker RN  Outcome: Ongoing     Problem: Discharge Planning:  Goal: Discharged to appropriate level of care  Description: Discharged to appropriate level of care  12/12/2021 1400 by Vinay Velazquez RN  Outcome: Ongoing  12/12/2021 0150 by Nehemiah Parker RN  Outcome: Ongoing     Problem: Cardiac Output - Decreased:  Goal: Cardiac output within specified parameters  Description: Cardiac output within specified parameters  12/12/2021 1400 by Vinay Velazquez RN  Outcome: Ongoing  12/12/2021 0150 by Nehemiah Parker RN  Outcome: Ongoing     Problem: Infection - Surgical Site:  Goal: Will show no infection signs and symptoms  Description: Will show no infection signs and symptoms  12/12/2021 1400 by Vinay Velazquez RN  Outcome: Ongoing  12/12/2021 0150 by Nehemiah Parker RN  Outcome: Ongoing     Problem: Pain - Acute:  Goal: Pain level will decrease  Description: Pain level will decrease  12/12/2021 1400 by Litzy Moctezuma RN  Outcome: Ongoing  12/12/2021 0150 by Addis Hurtado RN  Outcome: Ongoing     Problem: Venous Thromboembolism:  Goal: Will show no signs or symptoms of venous thromboembolism  Description: Will show no signs or symptoms of venous thromboembolism  12/12/2021 1400 by Litzy Moctezuma RN  Outcome: Ongoing  12/12/2021 0150 by Addis Hurtado RN  Outcome: Ongoing  Goal: Absence of signs or symptoms of impaired coagulation  Description: Absence of signs or symptoms of impaired coagulation  12/12/2021 1400 by Litzy Moctezuma RN  Outcome: Ongoing  12/12/2021 0150 by Addis Hurtado RN  Outcome: Ongoing     Problem: Skin Integrity:  Goal: Will show no infection signs and symptoms  Description: Will show no infection signs and symptoms  12/12/2021 1400 by Litzy Moctezuma RN  Outcome: Ongoing  12/12/2021 0150 by Addis Hurtado RN  Outcome: Ongoing  Goal: Absence of new skin breakdown  Description: Absence of new skin breakdown  12/12/2021 1400 by Litzy Moctezuma RN  Outcome: Ongoing  12/12/2021 0150 by Addis Hurtado RN  Outcome: Ongoing     Problem: Pain:  Goal: Pain level will decrease  Description: Pain level will decrease  12/12/2021 1400 by Litzy Moctezuma RN  Outcome: Ongoing  12/12/2021 0150 by Addis Hurtado RN  Outcome: Ongoing  Goal: Control of acute pain  Description: Control of acute pain  12/12/2021 1400 by Litzy Moctezuma RN  Outcome: Ongoing  12/12/2021 0150 by Addis Hurtado RN  Outcome: Ongoing  Goal: Control of chronic pain  Description: Control of chronic pain  12/12/2021 1400 by Litzy Moctezuma RN  Outcome: Ongoing  12/12/2021 0150 by Addis Hurtado RN  Outcome: Ongoing

## 2021-12-12 NOTE — PLAN OF CARE
Problem: OXYGENATION/RESPIRATORY FUNCTION  Goal: Patient will maintain patent airway  12/12/2021 0605 by Gavin Nelson RCP  Outcome: Ongoing     Problem: OXYGENATION/RESPIRATORY FUNCTION  Goal: Patient will achieve/maintain normal respiratory rate/effort  Description: Respiratory rate and effort will be within normal limits for the patient  12/12/2021 0605 by Gavin Nelson RCP  Outcome: Ongoing     Problem: Respiratory  Intervention: Respiratory assessment  Note:   PROVIDE ADEQUATE OXYGENATION WITH ACCEPTABLE SP02/ABG'S    [x]  IDENTIFY APPROPRIATE OXYGEN THERAPY  [x]   MONITOR SP02/ABG'S AS NEEDED   [x]   PATIENT EDUCATION AS NEEDED

## 2021-12-12 NOTE — PLAN OF CARE
Problem: Falls - Risk of:  Goal: Will remain free from falls  Description: Will remain free from falls  12/12/2021 0150 by Lenny Sharpe RN  Outcome: Ongoing  12/11/2021 1341 by Kadi Springer RN  Outcome: Ongoing  Goal: Absence of physical injury  Description: Absence of physical injury  12/12/2021 0150 by Lenny Sharpe RN  Outcome: Ongoing  12/11/2021 1341 by Kadi Springer RN  Outcome: Ongoing     Problem: OXYGENATION/RESPIRATORY FUNCTION  Goal: Patient will maintain patent airway  12/12/2021 0150 by Lenny Sharpe RN  Outcome: Ongoing  12/11/2021 1341 by Kadi Springer RN  Outcome: Ongoing  Goal: Patient will achieve/maintain normal respiratory rate/effort  Description: Respiratory rate and effort will be within normal limits for the patient  12/12/2021 0150 by Lenny Sharpe RN  Outcome: Ongoing  12/11/2021 1341 by Kadi Springer RN  Outcome: Ongoing     Problem: SKIN INTEGRITY  Goal: Skin integrity is maintained or improved  12/12/2021 0150 by Lenny Sharpe RN  Outcome: Ongoing  12/11/2021 1341 by Kadi Springer RN  Outcome: Ongoing     Problem: Discharge Planning:  Goal: Discharged to appropriate level of care  Description: Discharged to appropriate level of care  12/12/2021 0150 by Lenny Sharpe RN  Outcome: Ongoing  12/11/2021 1341 by Kadi Springer RN  Outcome: Ongoing     Problem: Cardiac Output - Decreased:  Goal: Cardiac output within specified parameters  Description: Cardiac output within specified parameters  12/12/2021 0150 by Lenny Sharpe RN  Outcome: Ongoing  12/11/2021 1341 by Kadi Springer RN  Outcome: Ongoing     Problem: Infection - Surgical Site:  Goal: Will show no infection signs and symptoms  Description: Will show no infection signs and symptoms  12/12/2021 0150 by Lenny Sharpe RN  Outcome: Ongoing  12/11/2021 1341 by Kadi Springer RN  Outcome: Ongoing     Problem: Pain - Acute:  Goal: Pain level will decrease  Description: Pain level will decrease  12/12/2021 0150 by Lenny Sharpe RN  Outcome: Ongoing  12/11/2021 1341 by Belkis Herrera RN  Outcome: Ongoing     Problem: Venous Thromboembolism:  Goal: Will show no signs or symptoms of venous thromboembolism  Description: Will show no signs or symptoms of venous thromboembolism  12/12/2021 0150 by Daniella Grimes RN  Outcome: Ongoing  12/11/2021 1341 by Belkis Herrera RN  Outcome: Ongoing  Goal: Absence of signs or symptoms of impaired coagulation  Description: Absence of signs or symptoms of impaired coagulation  12/12/2021 0150 by Daniella Grimes RN  Outcome: Ongoing  12/11/2021 1341 by Belkis Herrera RN  Outcome: Ongoing     Problem: Skin Integrity:  Goal: Will show no infection signs and symptoms  Description: Will show no infection signs and symptoms  12/12/2021 0150 by Daniella Grimes RN  Outcome: Ongoing  12/11/2021 1341 by Belkis Herrera RN  Outcome: Ongoing  Goal: Absence of new skin breakdown  Description: Absence of new skin breakdown  12/12/2021 0150 by Daniella Grimes RN  Outcome: Ongoing  12/11/2021 1341 by Belkis Herrera RN  Outcome: Ongoing     Problem: Pain:  Goal: Pain level will decrease  Description: Pain level will decrease  12/12/2021 0150 by Daniella Grimes RN  Outcome: Ongoing  12/11/2021 1341 by Belkis Herrera RN  Outcome: Ongoing  Goal: Control of acute pain  Description: Control of acute pain  12/12/2021 0150 by Daniella Grimes RN  Outcome: Ongoing  12/11/2021 1341 by Belkis Herrera RN  Outcome: Ongoing  Goal: Control of chronic pain  Description: Control of chronic pain  12/12/2021 0150 by Daniella Grimes RN  Outcome: Ongoing  12/11/2021 1341 by Belkis Herrera RN  Outcome: Ongoing

## 2021-12-12 NOTE — PROGRESS NOTES
Physical Therapy  Facility/Department: [facility] CAR 1  Daily Treatment Note  NAME: Bria Byrne  : 1960  MRN: [id number]    Date of Service: 2021    Discharge Recommendations:  Patient would benefit from continued therapy after discharge   PT Equipment Recommendations  Equipment Needed: No    Assessment   Body structures, Functions, Activity limitations: Decreased functional mobility ; Decreased posture; Decreased endurance; Decreased ROM; Decreased strength; Decreased balance; Decreased safe awareness  Assessment: Pt completed sup-sit w/modA, CGA for sit<>stand and gait trial 45 ft X 1 using hallway rails requiring min-CGA. Pt will benefit from further PT in acute setting and post d/c facilitating return to functional IND. Prognosis: Good  Decision Making: High Complexity  REQUIRES PT FOLLOW UP: Yes  Activity Tolerance  Activity Tolerance: Patient limited by endurance; Patient limited by fatigue     Patient Diagnosis(es): There were no encounter diagnoses.      has a past medical history of Arm pain, Atrial fibrillation (HCC), Atrial fibrillation status post cardioversion [facility]), Atrial flutter ([facility] [facility] Carotid artery stenosis, Cerebral artery occlusion with cerebral infarction [facility]), Chest pain, CHF (congestive heart failure) ([facility] Chipped tooth, COPD (chronic obstructive pulmonary disease) ([facility] [facility] Depression, Former tobacco use, H/O: CVA (cerebrovascular accident), Heart palpitations, History of lateral epicondylitis of right elbow, Hx of blood clots, Hyperlipidemia, Hyperparathyroidism ([facility] [facility] Hypertensive chronic kidney disease with stage 1 through stage 4 chronic kidney disease, or unspecified chronic kidney disease, Long term current use of anticoagulant, Mitral regurgitation, Mitral valve insufficiency, Mitral valve regurgitation, Muscle spasm, Nerve pain, Osteoporosis, Personal history of other medical treatment, Radial tunnel syndrome, Restless legs, SOB (shortness of breath), Tennis elbow, Under care of team, Under care of team, Under care of team, Under care of team, and Wears glasses. has a past surgical history that includes  section; Wrist ganglion excision (Right); Cardioversion (2016); Cardioversion (2019); Colonoscopy; ablation of dysrhythmic focus (2020); transesophageal echocardiogram (10/05/2021); Cardiac catheterization (2021); Endoscopy, colon, diagnostic; eye surgery; skin biopsy; [place] tooth extraction; and Mitral valve repair (N/A, 2021). Restrictions  Restrictions/Precautions  Restrictions/Precautions: Fall Risk  Required Braces or Orthoses?: Yes  Required Braces or Orthoses  Other: Heart Hugger Brace  Left Upper Extremity Brace/Splint: Sling  Left Upper Extremity Brace/Splint: Sling 2* pace maker  Position Activity Restriction  Sternal Precautions: 5# Lifting Restrictions  Other position/activity restrictions: ambulate pt, up in a chair for meals, chest tubes, 1 to 1.5 Lt NC, MVR ,  PPM 12/10/21-Do not elevate affected arm above shoulder for 30 days  4) Instruct patient to keep wound clean and dry. No showering for one week. Subjective   General  Family / Caregiver Present: No  Subjective  Subjective: Pt in supine upon arrival. Pleasant and cooperative. General Comment  Comments: Pt plans on home d/c w/ and 9yr old son assist.  Needs to negotiate flight of stairs to access bedroom.     Pain Screening  Patient Currently in Pain: No  Vital Signs  Patient Currently in Pain: No       Orientation  Overall Orientation Status: Within Normal Limits     Objective   Bed mobility  Scooting: Contact guard assistance  Transfers  Sit to Stand: Contact guard assistance  Stand to sit: Contact guard assistance  Ambulation  Ambulation?: Yes  Ambulation 1  Surface: level tile  Device: Goldstein rail  Assistance: Minimal assistance; Contact guard assistance  Quality of Gait: Slow/careful pace, mild shuffling, mild general unsteadiness  Gait Deviations: Decreased step length; Decreased step height; Slow [place]  Distance: 45 ft X 1  Comments: Pt ambulated on RA, SpO2=94% upon return, no c/o nausea w/OOB activity. In chair post session, call light in reach, RN aware. Stairs/Curb  Stairs?: No     Balance  Posture: Fair  Sitting - Static: Fair; +  Sitting - Dynamic: Fair  Standing - Static: Fair  Standing - Dynamic: Fair; -  Comments: sitting balance assessed at the EOB. Exercises  Comments: Pt educated on seated BLE AROM ex's to be completed while sitting in chair. She demo's good teach back of ex program and verbalizes intent to comply throughout the day. Comment: Encouraged pursed lip breathing while mobilizing and gentle LUE elbow flexion, wrist supination/pronation and shoulder flexion to mx 90 deg's all AROM while seated providing gentle strengthening and circlation to LUE. Goals  Short term goals  Time Frame for Short term goals: 14 visits  Short term goal 1: Pt will ambulate 300 feet with least restrictive device and supervision to increase functional independence. Short term goal 2: Pt will tolerate a 30 minute therapy session to promote increased endurance. Short term goal 3: Pt will negotiate 1 stair with no handrail and SBA to allow the pt to enter prior living arrangements. Short term goal 4: Pt will demonstrate good- standing balance to decrease fall risk. Short term goal 5: Pt will perform sit<>stand transfer with supervision to increase functional independence.     Plan  Times per week: 6-7  Times per day:  (1-2x/day)  Current Treatment Recommendations: Strengthening, Transfer Training, Endurance Training, ROM, Balance Training, Gait Training, Functional Mobility Training, Stair training, Safety Education & Training, Home Exercise Program, Equipment Evaluation, Education, & procurement, Patient/Caregiver Education & Training  Safety Devices  Type of devices: Patient at risk for falls, Left in chair, Call light within

## 2021-12-12 NOTE — PLAN OF CARE
Problem: Falls - Risk of:  Goal: Will remain free from falls  Description: Will remain free from falls  12/12/2021 1401 by Wolf Steve RN  Outcome: Ongoing  12/12/2021 1400 by Wolf Steve RN  Outcome: Ongoing  12/12/2021 0150 by Edilberto Hillman RN  Outcome: Ongoing  Goal: Absence of physical injury  Description: Absence of physical injury  12/12/2021 1401 by Wolf Steve RN  Outcome: Ongoing  12/12/2021 1400 by Wolf Steve RN  Outcome: Ongoing  12/12/2021 0150 by Edilberto Hillman RN  Outcome: Ongoing     Problem: OXYGENATION/RESPIRATORY FUNCTION  Goal: Patient will maintain patent airway  12/12/2021 1401 by Wolf Steve RN  Outcome: Ongoing  12/12/2021 1400 by Wolf Steve RN  Outcome: Ongoing  12/12/2021 0605 by Carleen Juarez RCP  Outcome: Ongoing  12/12/2021 0150 by Edilberto Hillman RN  Outcome: Ongoing  Goal: Patient will achieve/maintain normal respiratory rate/effort  Description: Respiratory rate and effort will be within normal limits for the patient  12/12/2021 1401 by Wolf Steve RN  Outcome: Ongoing  12/12/2021 1400 by Wolf Steve RN  Outcome: Ongoing  12/12/2021 0605 by Carleen Juarez RCP  Outcome: Ongoing  12/12/2021 0150 by Edilberto Hillman RN  Outcome: Ongoing     Problem: SKIN INTEGRITY  Goal: Skin integrity is maintained or improved  12/12/2021 1401 by Wolf Steve RN  Outcome: Ongoing  12/12/2021 1400 by Wolf Steve RN  Outcome: Ongoing  12/12/2021 0150 by Edilberto Hillman RN  Outcome: Ongoing     Problem: Discharge Planning:  Goal: Discharged to appropriate level of care  Description: Discharged to appropriate level of care  12/12/2021 1401 by Wolf Steve RN  Outcome: Ongoing  12/12/2021 1400 by Wolf Steve RN  Outcome: Ongoing  12/12/2021 0150 by Edilberto Hillman RN  Outcome: Ongoing     Problem: Cardiac Output - Decreased:  Goal: Cardiac output within specified parameters  Description: Cardiac output within specified parameters  12/12/2021 1401 by Wolf Steve RN  Outcome: Ongoing  12/12/2021 1400 by Wolf Steve, RN  Outcome: Ongoing  12/12/2021 0150 by Eric Schumacher RN  Outcome: Ongoing     Problem: Infection - Surgical Site:  Goal: Will show no infection signs and symptoms  Description: Will show no infection signs and symptoms  12/12/2021 1401 by Tangela Hinkle RN  Outcome: Ongoing  12/12/2021 1400 by Tangela Hinkle RN  Outcome: Ongoing  12/12/2021 0150 by Eric Schumacher RN  Outcome: Ongoing     Problem: Pain - Acute:  Goal: Pain level will decrease  Description: Pain level will decrease  12/12/2021 1401 by Tangela Hinkle RN  Outcome: Ongoing  12/12/2021 1400 by Tangela Hinkle RN  Outcome: Ongoing  12/12/2021 0150 by Eric Schumacher RN  Outcome: Ongoing     Problem: Venous Thromboembolism:  Goal: Will show no signs or symptoms of venous thromboembolism  Description: Will show no signs or symptoms of venous thromboembolism  12/12/2021 1401 by Tangela Hinkle RN  Outcome: Ongoing  12/12/2021 1400 by Tangela Hinkle RN  Outcome: Ongoing  12/12/2021 0150 by Eric Schumacher RN  Outcome: Ongoing  Goal: Absence of signs or symptoms of impaired coagulation  Description: Absence of signs or symptoms of impaired coagulation  12/12/2021 1401 by Tangela Hinkle RN  Outcome: Ongoing  12/12/2021 1400 by Tangela Hinkle RN  Outcome: Ongoing  12/12/2021 0150 by Eric Schumacher RN  Outcome: Ongoing     Problem: Skin Integrity:  Goal: Will show no infection signs and symptoms  Description: Will show no infection signs and symptoms  12/12/2021 1401 by Tangela Hinkle RN  Outcome: Ongoing  12/12/2021 1400 by Tangela Hinkle RN  Outcome: Ongoing  12/12/2021 0150 by Eric Schumacher RN  Outcome: Ongoing  Goal: Absence of new skin breakdown  Description: Absence of new skin breakdown  12/12/2021 1401 by Tangela Hinkle RN  Outcome: Ongoing  12/12/2021 1400 by Tangela Hinkle RN  Outcome: Ongoing  12/12/2021 0150 by Eric Schumacher RN  Outcome: Ongoing     Problem: Pain:  Goal: Pain level will decrease  Description: Pain level will decrease  12/12/2021 1401 by Tangela Hinkle RN  Outcome: Ongoing  12/12/2021 1400 by Natalie Porter RN  Outcome: Ongoing  12/12/2021 0150 by Cas Delgado RN  Outcome: Ongoing  Goal: Control of acute pain  Description: Control of acute pain  12/12/2021 1401 by Natalie Porter RN  Outcome: Ongoing  12/12/2021 1400 by Natalie Porter RN  Outcome: Ongoing  12/12/2021 0150 by Cas Delgado RN  Outcome: Ongoing  Goal: Control of chronic pain  Description: Control of chronic pain  12/12/2021 1401 by Natalie Porter RN  Outcome: Ongoing  12/12/2021 1400 by Natalie Porter RN  Outcome: Ongoing  12/12/2021 0150 by Cas Delgado RN  Outcome: Ongoing

## 2021-12-13 ENCOUNTER — APPOINTMENT (OUTPATIENT)
Dept: GENERAL RADIOLOGY | Age: 61
DRG: 220 | End: 2021-12-13
Attending: THORACIC SURGERY (CARDIOTHORACIC VASCULAR SURGERY)
Payer: MEDICARE

## 2021-12-13 LAB
ANION GAP SERPL CALCULATED.3IONS-SCNC: 11 MMOL/L (ref 9–17)
BUN BLDV-MCNC: 17 MG/DL (ref 8–23)
BUN/CREAT BLD: ABNORMAL (ref 9–20)
CALCIUM SERPL-MCNC: 8.5 MG/DL (ref 8.6–10.4)
CHLORIDE BLD-SCNC: 103 MMOL/L (ref 98–107)
CO2: 22 MMOL/L (ref 20–31)
CREAT SERPL-MCNC: 0.66 MG/DL (ref 0.5–0.9)
GFR AFRICAN AMERICAN: >60 ML/MIN
GFR NON-AFRICAN AMERICAN: >60 ML/MIN
GFR SERPL CREATININE-BSD FRML MDRD: ABNORMAL ML/MIN/{1.73_M2}
GFR SERPL CREATININE-BSD FRML MDRD: ABNORMAL ML/MIN/{1.73_M2}
GLUCOSE BLD-MCNC: 67 MG/DL (ref 65–105)
GLUCOSE BLD-MCNC: 71 MG/DL (ref 65–105)
GLUCOSE BLD-MCNC: 79 MG/DL (ref 65–105)
GLUCOSE BLD-MCNC: 80 MG/DL (ref 70–99)
GLUCOSE BLD-MCNC: 94 MG/DL (ref 65–105)
HCT VFR BLD CALC: 33.4 % (ref 36.3–47.1)
HEMOGLOBIN: 10.3 G/DL (ref 11.9–15.1)
INR BLD: 3.2
MAGNESIUM: 2.3 MG/DL (ref 1.6–2.6)
MCH RBC QN AUTO: 29.7 PG (ref 25.2–33.5)
MCHC RBC AUTO-ENTMCNC: 30.8 G/DL (ref 28.4–34.8)
MCV RBC AUTO: 96.3 FL (ref 82.6–102.9)
NRBC AUTOMATED: 1.1 PER 100 WBC
PDW BLD-RTO: 16.3 % (ref 11.8–14.4)
PLATELET # BLD: 166 K/UL (ref 138–453)
PMV BLD AUTO: 11.6 FL (ref 8.1–13.5)
POTASSIUM SERPL-SCNC: 3.7 MMOL/L (ref 3.7–5.3)
PROTHROMBIN TIME: 31.3 SEC (ref 9.1–12.3)
RBC # BLD: 3.47 M/UL (ref 3.95–5.11)
SODIUM BLD-SCNC: 136 MMOL/L (ref 135–144)
WBC # BLD: 8 K/UL (ref 3.5–11.3)

## 2021-12-13 PROCEDURE — 99024 POSTOP FOLLOW-UP VISIT: CPT | Performed by: NURSE PRACTITIONER

## 2021-12-13 PROCEDURE — 6370000000 HC RX 637 (ALT 250 FOR IP): Performed by: STUDENT IN AN ORGANIZED HEALTH CARE EDUCATION/TRAINING PROGRAM

## 2021-12-13 PROCEDURE — 80048 BASIC METABOLIC PNL TOTAL CA: CPT

## 2021-12-13 PROCEDURE — 99024 POSTOP FOLLOW-UP VISIT: CPT | Performed by: PHYSICIAN ASSISTANT

## 2021-12-13 PROCEDURE — 6370000000 HC RX 637 (ALT 250 FOR IP): Performed by: THORACIC SURGERY (CARDIOTHORACIC VASCULAR SURGERY)

## 2021-12-13 PROCEDURE — 85027 COMPLETE CBC AUTOMATED: CPT

## 2021-12-13 PROCEDURE — 6370000000 HC RX 637 (ALT 250 FOR IP): Performed by: NURSE PRACTITIONER

## 2021-12-13 PROCEDURE — 94640 AIRWAY INHALATION TREATMENT: CPT

## 2021-12-13 PROCEDURE — 36415 COLL VENOUS BLD VENIPUNCTURE: CPT

## 2021-12-13 PROCEDURE — 97530 THERAPEUTIC ACTIVITIES: CPT

## 2021-12-13 PROCEDURE — 71045 X-RAY EXAM CHEST 1 VIEW: CPT

## 2021-12-13 PROCEDURE — 97535 SELF CARE MNGMENT TRAINING: CPT

## 2021-12-13 PROCEDURE — 2580000003 HC RX 258: Performed by: NURSE PRACTITIONER

## 2021-12-13 PROCEDURE — APPNB45 APP NON BILLABLE 31-45 MINUTES: Performed by: NURSE PRACTITIONER

## 2021-12-13 PROCEDURE — 83735 ASSAY OF MAGNESIUM: CPT

## 2021-12-13 PROCEDURE — 2140000001 HC CVICU INTERMEDIATE R&B

## 2021-12-13 PROCEDURE — 97116 GAIT TRAINING THERAPY: CPT

## 2021-12-13 PROCEDURE — 85610 PROTHROMBIN TIME: CPT

## 2021-12-13 PROCEDURE — 6360000002 HC RX W HCPCS: Performed by: NURSE PRACTITIONER

## 2021-12-13 PROCEDURE — 82947 ASSAY GLUCOSE BLOOD QUANT: CPT

## 2021-12-13 RX ORDER — ROPINIROLE 1 MG/1
1 TABLET, FILM COATED ORAL DAILY
Status: DISCONTINUED | OUTPATIENT
Start: 2021-12-14 | End: 2021-12-14 | Stop reason: HOSPADM

## 2021-12-13 RX ORDER — ROPINIROLE 1 MG/1
2 TABLET, FILM COATED ORAL 2 TIMES DAILY
Status: DISCONTINUED | OUTPATIENT
Start: 2021-12-13 | End: 2021-12-14 | Stop reason: HOSPADM

## 2021-12-13 RX ORDER — ROPINIROLE 1 MG/1
1 TABLET, FILM COATED ORAL 3 TIMES DAILY
Status: DISCONTINUED | OUTPATIENT
Start: 2021-12-13 | End: 2021-12-13

## 2021-12-13 RX ADMIN — CLONAZEPAM 1 MG: 1 TABLET ORAL at 21:24

## 2021-12-13 RX ADMIN — IPRATROPIUM BROMIDE AND ALBUTEROL SULFATE 1 AMPULE: .5; 2.5 SOLUTION RESPIRATORY (INHALATION) at 20:27

## 2021-12-13 RX ADMIN — FUROSEMIDE 20 MG: 10 INJECTION, SOLUTION INTRAMUSCULAR; INTRAVENOUS at 08:30

## 2021-12-13 RX ADMIN — SENNOSIDES 8.6 MG: 8.6 TABLET, COATED ORAL at 21:22

## 2021-12-13 RX ADMIN — SODIUM CHLORIDE, PRESERVATIVE FREE 10 ML: 5 INJECTION INTRAVENOUS at 08:38

## 2021-12-13 RX ADMIN — ROPINIROLE HYDROCHLORIDE 2 MG: 1 TABLET, FILM COATED ORAL at 12:47

## 2021-12-13 RX ADMIN — DOCUSATE SODIUM 100 MG: 100 CAPSULE ORAL at 08:30

## 2021-12-13 RX ADMIN — POTASSIUM CHLORIDE 30 MEQ: 1500 TABLET, EXTENDED RELEASE ORAL at 10:26

## 2021-12-13 RX ADMIN — IPRATROPIUM BROMIDE AND ALBUTEROL SULFATE 1 AMPULE: .5; 2.5 SOLUTION RESPIRATORY (INHALATION) at 11:23

## 2021-12-13 RX ADMIN — IPRATROPIUM BROMIDE AND ALBUTEROL SULFATE 1 AMPULE: .5; 2.5 SOLUTION RESPIRATORY (INHALATION) at 15:12

## 2021-12-13 RX ADMIN — GABAPENTIN 800 MG: 400 CAPSULE ORAL at 21:20

## 2021-12-13 RX ADMIN — MIDODRINE HYDROCHLORIDE 5 MG: 5 TABLET ORAL at 16:37

## 2021-12-13 RX ADMIN — MIDODRINE HYDROCHLORIDE 5 MG: 5 TABLET ORAL at 12:08

## 2021-12-13 RX ADMIN — PANTOPRAZOLE SODIUM 40 MG: 40 TABLET, DELAYED RELEASE ORAL at 08:30

## 2021-12-13 RX ADMIN — SODIUM CHLORIDE, PRESERVATIVE FREE 10 ML: 5 INJECTION INTRAVENOUS at 21:24

## 2021-12-13 RX ADMIN — POLYETHYLENE GLYCOL 3350 17 G: 17 POWDER, FOR SOLUTION ORAL at 08:32

## 2021-12-13 RX ADMIN — METOPROLOL TARTRATE 12.5 MG: 25 TABLET ORAL at 08:32

## 2021-12-13 RX ADMIN — ATORVASTATIN CALCIUM 10 MG: 10 TABLET, FILM COATED ORAL at 21:18

## 2021-12-13 RX ADMIN — DOCUSATE SODIUM 100 MG: 100 CAPSULE ORAL at 21:19

## 2021-12-13 RX ADMIN — ACETAMINOPHEN 650 MG: 325 TABLET ORAL at 16:39

## 2021-12-13 RX ADMIN — ROPINIROLE HYDROCHLORIDE 2 MG: 1 TABLET, FILM COATED ORAL at 21:22

## 2021-12-13 RX ADMIN — BACLOFEN 20 MG: 10 TABLET ORAL at 21:19

## 2021-12-13 RX ADMIN — Medication 81 MG: at 08:30

## 2021-12-13 RX ADMIN — MIDODRINE HYDROCHLORIDE 5 MG: 5 TABLET ORAL at 08:30

## 2021-12-13 ASSESSMENT — PAIN SCALES - GENERAL
PAINLEVEL_OUTOF10: 0
PAINLEVEL_OUTOF10: 1
PAINLEVEL_OUTOF10: 0

## 2021-12-13 ASSESSMENT — PAIN DESCRIPTION - PAIN TYPE: TYPE: CHRONIC PAIN

## 2021-12-13 ASSESSMENT — PAIN DESCRIPTION - LOCATION: LOCATION: LEG

## 2021-12-13 ASSESSMENT — PAIN DESCRIPTION - ORIENTATION: ORIENTATION: RIGHT;LEFT

## 2021-12-13 NOTE — PROGRESS NOTES
[facility]   Progress Note                    Date:   12/13/2021  Patient name:  Elvin Das  Date of admission:  12/7/2021  5:16 AM  MRN:   [id number]  YOB: 1960  PCP:    Purnima Oneil MD    Reason for Admission:  Severe mitral regurgitation [I34.0]    Subjective:      Clinical Changes / Abnormalities:    Patient seen and examined at bedside. No acute events overnight. No chest pain or shortness of breath.       Urine output in the last 24 hours:     Intake/Output Summary (Last 24 hours) at 12/13/2021 0611  Last data filed at 12/12/2021 1722  Gross per 24 hour   Intake --   Output 418 ml   Net -418 ml     I/O since admission: -777 cc    Medications:   Scheduled Meds:   gabapentin  800 mg Oral Nightly    furosemide  20 mg IntraVENous Daily    sodium chloride flush  5-40 mL IntraVENous 2 times per day    sodium chloride flush  5-40 mL IntraVENous 2 times per day    midodrine  5 mg Oral TID WC    metoprolol tartrate  12.5 mg Oral BID    atorvastatin  10 mg Oral Nightly    docusate sodium  100 mg Oral BID    senna  1 tablet Oral Nightly    warfarin (COUMADIN) daily dosing (placeholder)   Other RX Placeholder    insulin lispro  0-6 Units SubCUTAneous TID WC    insulin lispro  0-3 Units SubCUTAneous Nightly    influenza virus vaccine  0.5 mL IntraMUSCular Prior to discharge    baclofen  20 mg Oral Nightly    clonazePAM  1 mg Oral Nightly    sodium chloride flush  10 mL IntraVENous 2 times per day    aspirin  81 mg Oral Daily    polyethylene glycol  17 g Oral Daily    pantoprazole  40 mg Oral Daily    ipratropium-albuterol  1 ampule Inhalation Q4H WA     Continuous Infusions:   sodium chloride      sodium chloride      sodium chloride      norepinephrine Stopped (12/09/21 1840)    dextrose       CBC:   Recent Labs     12/11/21 0354 12/12/21 0325   WBC 9.2 8.8   HGB 10.9* 10.9*    149     BMP:    Recent Labs     12/11/21 0354 12/12/21 0325    135 K 4.4 3.8    101   CO2 21 20   BUN 23 28*   CREATININE 0.84 0.93*   GLUCOSE 100* 89     Hepatic: No results for input(s): AST, ALT, ALB, BILITOT, ALKPHOS in the last 72 hours. Troponin: No results for input(s): TROPONINI in the last 72 hours. No results for input(s): TROPONINT in the last 72 hours. BNP: No results for input(s): PROBNP in the last 72 hours. No results for input(s): BNP in the last 72 hours. Lipids: No results for input(s): CHOL, HDL in the last 72 hours. Invalid input(s): LDLCALCU  INR:   Recent Labs     12/11/21  0354 12/12/21  0325   INR 1.6 1.8       Objective:   Vitals: BP (!) 110/58   Pulse 69   Temp 98.5 °F (36.9 °C) (Oral)   Resp 17   Ht 5' 4\" (1.626 m)   Wt 167 lb 8.8 oz (76 kg)   SpO2 94%   BMI 28.76 kg/m²    Constitutional and General Appearance:   · alert, cooperative, no distress and appears stated age  Respiratory:  · No for increased work of breathing. · On auscultation: clear to auscultation bilaterally  Cardiovascular:  · The apical impulse is not displaced  · Heart tones are crisp and normal. Regular S1 and S2. No murmurs. Abdomen:   · No masses or tenderness  · Bowel sounds present  Extremities:  ·  No Cyanosis or Clubbing  ·  Lower extremity edema: No  ·  Skin: Warm and dry  Neurological:  · Alert and oriented.        Diagnostic Studies:         ECHO: 12/8/21  Summary  Global left ventricular systolic function appears moderately reduced. Estimated ejection fraction is 35 % . Calculated EF via Vazquez's method is 38 %. Calculated EF via 3D Heart Model is 33 %. Leftward compression of inter-ventricular septum (\"D-sign\") indicating RV  pressure/volume overload. The right ventricle appears mildly dilated. Aortic valve structure and function normal.  Aortic valve is trileaflet. Trivial aortic insufficiency. No aortic stenosis. A 27 mm Magna Ease bioprosthetic valve is seen in the mitral position. At least mild mitral regurgitation.   Normal tricuspid valve leaflets. Moderate to severe tricuspid regurgitation. Mild pulmonary hypertension with an estimated right ventricular systolic  pressure of 46 mmHg. No pericardial effusion.     Cardiac Angiography: 11/8/21  Cardiac Arteries and Lesion Findings     LMCA: Normal 0% stenosis.     LAD: Normal 0% stenosis.     LCx: Normal 0% stenosis.     RCA: Normal 0% stenosis.      Coronary Tree      Dominance: Right     LV Analysis  LV function assessed as:Normal.           Assessment / Acute Cardiac Problems:   1. Severe MR s/p  mitral valve replacement with Magna Ease + maze + CLARIBEL  2. Moderate tp severe TR  3. Intermittent Complete heart block s/p permanent pacemaker implantation on 12/10/21  4. Paroxsymal afib/a flutter, hx of cardivoersion, failed cryoablation with Dr Che Carter on 6/22/2020  5. LAURA  6. CKD  7. Hx of CVA     Plan of Treatment:   1. Continue ASA and lipitor  2. Continue Lopressor 12.5 mg BID when BP can tolerate  3. Continue warfarin. Pharmacy to dose. 4. IV diuresis  5. Post op management per CT surgery  6. Incentive spirometry  7. Good glycemic index    Zeus Jordan MD  Fellow, [address] [place] [address]      Attending Physician Statement  I have discussed the care of Marion Flowers, including pertinent history and exam findings,  with the cardiology fellow/resident. I have seen and examined the patient and the key elements of all parts of the encounter have been performed by me. I agree with the assessment, plan and orders as documented by the resident with additional recommendations as below:     Hemodynamically stable, no acute issues post op and post PPM placement, remains in atrial flutter with good rate control, tolerating low dose BB. Ambulating in hallway. Continue routine post op care per CTS. Flutter managemnt as OP per EP (follows with Dr. Che Carter). On anticoagulation with coumadin.        Amos Cali MD, [facility]

## 2021-12-13 NOTE — PROGRESS NOTES
[facility]  Daily Progress Note    Surgeon: Dr. Ray Bangura  S/P: MITRAL VALVE REPLACE, MAZE, CLARIBEL   POD#: 5  EF: 50-55%     Subjective:  Ms. Antwon Randolph feels better today with no acute complaints. Patient is resting in bed, reports improved sleep with gabapentin resumed. Permanent pacemaker placed on POD 3. Sling in place to left arm. She states that her breathing is ok and her pain is tolerable. Chest tubes removed yesterday without incident and temporary pacing wires clipped. Patient would like to go home on discharge. She has [facility] care with [facility] coumadin clinic. Physical Exam  Vital Signs: BP (!) 110/58   Pulse 69   Temp 98.5 °F (36.9 °C) (Oral)   Resp 17   Ht 5' 4\" (1.626 m)   Wt 167 lb 8.8 oz (76 kg)   SpO2 94%   BMI 28.76 kg/m²  O2 Flow Rate (L/min): 2 L/min   Admit Weight: Weight: 145 lb 8.1 oz (66 kg)   WEIGHTWeight: 167 lb 8.8 oz (76 kg)     General: alert and oriented to person, place and time, well-developed and well-nourished, in no acute distress. Heart: Normal S1 and S2.  Regular rhythm. No murmurs, gallops, or rubs. Pacing Wires: Yes - To be clipped prior to discharge  Lungs: clear to auscultation bilaterally and diminished breath sounds bibasilar Chest tubes: Yes, Air Leak: yes  Abdomen: soft, non tender, non distended, BS hypoactive x4  Extremities: Trace edema  Wounds: clean and dry, healing appropriately. Prevena to Sternum.       Sternum: Covered  SVG sites: Covered    Scheduled Meds:    gabapentin  800 mg Oral Nightly    furosemide  20 mg IntraVENous Daily    sodium chloride flush  5-40 mL IntraVENous 2 times per day    sodium chloride flush  5-40 mL IntraVENous 2 times per day    midodrine  5 mg Oral TID WC    metoprolol tartrate  12.5 mg Oral BID    atorvastatin  10 mg Oral Nightly    docusate sodium  100 mg Oral BID    senna  1 tablet Oral Nightly    warfarin (COUMADIN) daily dosing (placeholder)   Other RX Placeholder    insulin lispro  0-6 Units SubCUTAneous TID     insulin lispro  0-3 Units SubCUTAneous Nightly    influenza virus vaccine  0.5 mL IntraMUSCular Prior to discharge    baclofen  20 mg Oral Nightly    clonazePAM  1 mg Oral Nightly    sodium chloride flush  10 mL IntraVENous 2 times per day    aspirin  81 mg Oral Daily    polyethylene glycol  17 g Oral Daily    pantoprazole  40 mg Oral Daily    ipratropium-albuterol  1 ampule Inhalation Q4H WA     Continuous Infusions:    sodium chloride      sodium chloride      sodium chloride      norepinephrine Stopped (12/09/21 1840)    dextrose         Data:  CBC:   Recent Labs     12/11/21  0354 12/12/21  0325 12/13/21  0532   WBC 9.2 8.8 8.0   HGB 10.9* 10.9* 10.3*   HCT 34.4* 34.3* 33.4*   MCV 93.5 94.8 96.3    149 166     BMP:   Recent Labs     12/11/21  0354 12/12/21  0325 12/13/21  0532    135 136   K 4.4 3.8 3.7    101 103   CO2 21 20 22   BUN 23 28* 17   CREATININE 0.84 0.93* 0.66     PT/INR:   Recent Labs     12/11/21  0354 12/12/21  0325 12/13/21  0532   PROTIME 16.1* 18.5* 31.3*   INR 1.6 1.8 3.2     APTT:   No results for input(s): APTT in the last 72 hours.     Chest X-Ray: Imaging reviewed, report pending    I/O:  I/O last 3 completed shifts:  In: -   Out: 418 [Urine:400; Chest Tube:18]    Assessment/Plan:      Diagnosis Date    Arm pain Right and Left    Atrial fibrillation (HCC)     Atrial fibrillation status post cardioversion (HCC)     Atrial flutter (HCC)     Cancer ([address]     skin    Carotid artery stenosis     Cerebral artery occlusion with cerebral infarction ([address]     Chest pain 11/2003    CHF (congestive heart failure) ([address]     Chipped tooth 12/01/2021    Top left    COPD (chronic obstructive pulmonary disease) ([address]     Depression     Former tobacco use     H/O: CVA (cerebrovascular accident)     1-    Heart palpitations     History of lateral epicondylitis of right elbow     And left elbow    Hx of blood clots     Hyperlipidemia     [facility]     Hypertensive chronic kidney disease with stage 1 through stage 4 chronic kidney disease, or unspecified chronic kidney disease     Long term current use of anticoagulant     Mitral regurgitation     Mitral valve insufficiency     Mitral valve regurgitation     Muscle spasm     Nerve pain     Osteoporosis     Personal history of other medical treatment     Chronic diastolic congestive heart failure    Radial tunnel syndrome Right radial tunnel release     Restless legs     SOB (shortness of breath) 11/08/2021    Tennis elbow Right and left    Under care of team 12/01/2021    Cardiologist: Florian Camilo, last visit 8/2021    Under care of team 12/01/2021    PCP: Dr. Shonda Harrington [place], last visit 6/2021    Under care of team 12/01/2021    Neurology: Dr. Raegan Leonard, last visit 11/2021    Under care of team 12/01/2021    Urology: Dr. Chacha Howell, last visit 10/2021    Wears glasses       Neuro: Intact   Lungs: clear, diminished in the bases   HD: VSS. Weaned vasopressors off   Edema: trace/1+ peripheral    GI: Hypoactive bowel sounds X4   : Good urine output- zayas in place    Beta-Blocker: yes- with holding parameters.    ASA: yes  Plavix: no  GI: yes  Statin: yes  Coumadin: yes  ACE-I: no  EF: 50-55%     Oxygen as needed to maintain SpO2 > 92%  o Wean as tolerated  o Use Bipap as needed   Chest x-ray daily   Keep Chest Tubes to wall suction  o Monitor output closely   Encourage incentive spirometry, acapella and ambulation    Lasix 20 mg IV daily   Coumadin - dosing per pharmacy (hold)  o INR goal 2.0-3.0  o Will bridge with lovenox   Replace electrolytes as needed per sliding scale and recheck per policy   Case Management consult for discharge planning- home with home care    The above recommendations including medications and orders were discussed and agreed upon with Dr. Jagdeep Farris, the attending on service

## 2021-12-13 NOTE — PROGRESS NOTES
[facility]  Daily Progress Note    Surgeon: Dr. Jaimie Hoffman  S/P: MITRAL VALVE REPLACE, MAZE, CLARIBEL   POD#: 6  EF: 50-55%     Subjective:  Ms. Lanny Bhatia feels better today with no acute complaints. Patient is up in the chair. Permanent pacemaker placed on POD 3. Sling in place to left arm. She states that her breathing is ok and her pain is tolerable. Chest tubes remain in place to wall suction with decreased output. Will continue to monitor output today and re-evaluate for possible removal later today. Physical Exam  Vital Signs: BP (!) 110/58   Pulse 69   Temp 98.5 °F (36.9 °C) (Oral)   Resp 17   Ht 5' 4\" (1.626 m)   Wt 167 lb 8.8 oz (76 kg)   SpO2 94%   BMI 28.76 kg/m²  O2 Flow Rate (L/min): 2 L/min   Admit Weight: Weight: 145 lb 8.1 oz (66 kg)   WEIGHTWeight: 167 lb 8.8 oz (76 kg)     General: alert and oriented to person, place and time, well-developed and well-nourished, in no acute distress. Heart: Normal S1 and S2.  Regular rhythm. No murmurs, gallops, or rubs. Pacing Wires: Yes - To be clipped prior to discharge  Lungs: clear to auscultation bilaterally and diminished breath sounds bibasilar Chest tubes: Yes, Air Leak: yes  Abdomen: soft, non tender, non distended, BS hypoactive x4  Extremities: Trace edema  Wounds: clean and dry, healing appropriately. Prevena to Sternum.       Sternum: Covered  SVG sites: Covered    Scheduled Meds:    gabapentin  800 mg Oral Nightly    furosemide  20 mg IntraVENous Daily    sodium chloride flush  5-40 mL IntraVENous 2 times per day    sodium chloride flush  5-40 mL IntraVENous 2 times per day    midodrine  5 mg Oral TID WC    metoprolol tartrate  12.5 mg Oral BID    atorvastatin  10 mg Oral Nightly    docusate sodium  100 mg Oral BID    senna  1 tablet Oral Nightly    warfarin (COUMADIN) daily dosing (placeholder)   Other RX Placeholder    insulin lispro  0-6 Units SubCUTAneous TID WC    insulin lispro  0-3 Units SubCUTAneous Nightly    influenza virus vaccine  0.5 mL IntraMUSCular Prior to discharge    baclofen  20 mg Oral Nightly    clonazePAM  1 mg Oral Nightly    sodium chloride flush  10 mL IntraVENous 2 times per day    aspirin  81 mg Oral Daily    polyethylene glycol  17 g Oral Daily    pantoprazole  40 mg Oral Daily    ipratropium-albuterol  1 ampule Inhalation Q4H WA     Continuous Infusions:    sodium chloride      sodium chloride      sodium chloride      norepinephrine Stopped (12/09/21 1840)    dextrose         Data:  CBC:   Recent Labs     12/11/21  0354 12/12/21 0325 12/13/21  0532   WBC 9.2 8.8 8.0   HGB 10.9* 10.9* 10.3*   HCT 34.4* 34.3* 33.4*   MCV 93.5 94.8 96.3    149 166     BMP:   Recent Labs     12/11/21  0354 12/12/21 0325 12/13/21  0532    135 136   K 4.4 3.8 3.7    101 103   CO2 21 20 22   BUN 23 28* 17   CREATININE 0.84 0.93* 0.66     PT/INR:   Recent Labs     12/11/21  0354 12/12/21 0325 12/13/21  0532   PROTIME 16.1* 18.5* 31.3*   INR 1.6 1.8 3.2     APTT:   No results for input(s): APTT in the last 72 hours. Chest X-Ray:   ONE XRAY VIEW OF THE CHEST       12/12/2021 6:16 am       COMPARISON:   12/11/2021       HISTORY:   ORDERING SYSTEM PROVIDED HISTORY: Post op CABG   TECHNOLOGIST PROVIDED HISTORY:   Post op CABG   Reason for Exam: uprt       FINDINGS:   Bilateral pleural effusions are present, small to moderate in size without   significant change.  There is bilateral basilar atelectasis.  Heart is   prominent in size.  There are changes of prior cardiac valve replacement and   left atrial appendage clip.  Dual lead left pacemaker is noted.  No evidence   of pneumothorax.  Osseous structures are stable.           Impression   Small bilateral pleural effusions and passive basilar atelectasis.  Postop   changes as described.  No significant interval change.          I/O:  I/O last 3 completed shifts:  In: -   Out: 418 [Urine:400; Chest Tube:18]    Assessment/Plan: Diagnosis Date    Arm pain Right and Left    Atrial fibrillation (HCC)     Atrial fibrillation status post cardioversion [facility])     Atrial flutter (HCC)     Cancer ([facility])     skin    Carotid artery stenosis     Cerebral artery occlusion with cerebral infarction [facility])     Chest pain 11/2003    CH[facility]estive heart failure) [facility] [address]     Chipped tooth 12/01/2021    Top left    COPD (chronic obstructive pulmonary disease) ([facility])     Depression     Former tobacco use     H/O: CVA (cerebrovascular accident)     1-    Heart palpitations     History of lateral epicondylitis of right elbow     And left elbow    Hx of blood clots     Hyperlipidemia     Hyperparathyroidism ([facility] [address]     Hypertensive chronic kidney disease with stage 1 through stage 4 chronic kidney disease, or unspecified chronic kidney disease     Long term current use of anticoagulant     Mitral regurgitation     Mitral valve insufficiency     Mitral valve regurgitation     Muscle spasm     Nerve pain     Osteoporosis     Personal history of other medical treatment     Chronic diastolic congestive heart failure    Radial tunnel syndrome Right radial tunnel release     Restless legs     SOB (shortness of breath) 11/08/2021    Tennis elbow Right and left    Under care of team 12/01/2021    Cardiologist: Eva Owen, last visit 8/2021    Under care of team 12/01/2021    PCP: Dr. Bridget Ramsay [place], last visit 6/2021    Under care of team 12/01/2021    Neurology: Dr. Jaqueline Zuniga, last visit 11/2021    Under care of team 12/01/2021    Urology: Dr. Shira Sepulveda, last visit 10/2021    Wears glasses       Neuro: Intact   Lungs: clear, diminished in the bases   HD: VSS. Weaned vasopressors off   Edema: trace/1+ peripheral    GI: Hypoactive bowel sounds X4   : Good urine output- zayas in place    Beta-Blocker: yes- with holding parameters.    ASA: yes  Plavix: no  GI: yes  Statin: yes  Coumadin: yes  ACE-I: no  EF: 50-55%    Plan:   D/C today INR 3.2 hold coumadin today.     GARDENIA Sinclair

## 2021-12-13 NOTE — PROGRESS NOTES
Occupational Therapy  Facility/Department: [facility] [place] CAR 1  Daily Treatment Note  NAME: Paolo Martinez  : 1960  MRN: [id number]    Date of Service: 2021    Discharge Recommendations:Further therapy recommended at discharge. The patient should be able to tolerate at least three hours of therapy per day over 5 days or 15 hours over 7 days. Assessment   Performance deficits / Impairments: Decreased functional mobility ; Decreased ADL status; Decreased high-level IADLs; Decreased endurance; Decreased cognition; Decreased strength; Decreased ROM; Decreased safe awareness; Decreased balance  Prognosis: Good  OT Education: OT Role; Transfer Training; ADL Adaptive Strategies; Precautions; Energy Conservation  Patient Education: purpose of OT; proper hand and foot placement; sternal precautions; sx soap; sling management; adaptive dressing  Barriers to Learning: pt rose marie CHEN carry over  REQUIRES OT FOLLOW UP: Yes  Activity Tolerance  Activity Tolerance: Patient Tolerated treatment well; Patient limited by fatigue  Safety Devices  Safety Devices in place: Yes  Type of devices: Gait belt; Left in chair; Call light within reach  Restraints  Initially in place: No       Patient Diagnosis(es): There were no encounter diagnoses.     has a past medical history of Arm pain, Atrial fibrillation (HCC), Atrial fibrillation status post cardioversion [facility]), Atrial flutter ([facility] [address] Cancer ([facility] [address] Carotid artery stenosis, Cerebral artery occlusion with cerebral infarction [facility]), Chest pain, CHF (congestive heart failure) ([facility] [address] Chipped tooth, COPD (chronic obstructive pulmonary disease) ([facility] [address] Depression, Former tobacco use, H/O: CVA (cerebrovascular accident), Heart palpitations, History of lateral epicondylitis of right elbow, Hx of blood clots, Hyperlipidemia, Hyperparathyroidism ([facility] [address] Hypertensive chronic kidney disease with stage 1 through stage 4 chronic kidney disease, or unspecified chronic kidney disease, Long term current use of anticoagulant, Mitral regurgitation, Mitral valve insufficiency, Mitral valve regurgitation, Muscle spasm, Nerve pain, Osteoporosis, Personal history of other medical treatment, Radial tunnel syndrome, Restless legs, SOB (shortness of breath), Tennis elbow, Under care of team, Under care of team, Under care of team, Under care of team, and Wears glasses. has a past surgical history that includes  section; Wrist ganglion excision (Right); Cardioversion (2016); Cardioversion (2019); Colonoscopy; ablation of dysrhythmic focus (2020); transesophageal echocardiogram (10/05/2021); Cardiac catheterization (2021); Endoscopy, colon, diagnostic; eye surgery; skin biopsy; Max tooth extraction; and Mitral valve repair (N/A, 2021). Restrictions  Restrictions/Precautions  Restrictions/Precautions: Fall Risk  Required Braces or Orthoses?: Yes  Required Braces or Orthoses  Other: Heart Hugger Brace  Left Upper Extremity Brace/Splint: Sling (pacemaker)  Left Upper Extremity Brace/Splint: Sling 2* pace maker  Position Activity Restriction  Sternal Precautions: 5# Lifting Restrictions  Other position/activity restrictions: ambulate pt, up in a chair for meals, chest tubes, 1 to 1.5 Lt NC, MVR ,  PPM 12/10/21-Do not elevate affected arm above shoulder for 30 days  4) Instruct patient to keep wound clean and dry. No showering for one week Do not elevate affected arm above shoulder for 30 days  4) Instruct patient to keep wound clean and dry. No showering for one week. Subjective   General  Chart Reviewed: Yes  Patient assessed for rehabilitation services?: Yes  Family / Caregiver Present: No  General Comment  Comments: Pt and RN agreeable to therapy this day  Pain Assessment  Pain Assessment: 0-10  Pain Level: 0  Pre Treatment Pain Screening  Comments / Details: Pt seated in chair at start of session pleasant and cooperative.  At session end pt seated in chair with BLE elevated, call light in reach and all needs met. Vital Signs  Patient Currently in Pain: Denies   Orientation  Orientation  Overall Orientation Status: Within Functional Limits  Objective    ADL  Grooming: Modified independent ; Setup; Verbal cueing (face washing)  UE Bathing: Stand by assistance; Setup; Verbal cueing; Increased time to complete (seated in chair)  LE Bathing: Setup; Verbal cueing; Increased time to complete; Minimal assistance (seated/standing)  UE Dressing: Minimal assistance; Moderate assistance; Setup; Verbal cueing; Increased time to complete (to doff/don gown and sling)  LE Dressing: Maximum assistance; Setup (to doff/don socks)  Toileting: Contact guard assistance; Setup; Increased time to complete (seated at toilet)  Additional Comments: Bathing and dressing facilitated this day in order to increase pt precaution adherence, independence and endurance. Education provided on how to utilize [employer], utilized appriatly with increased verbal cues from [employer]. Mod I for face washing seated. SBA for UB bathing seated in chair req cues on initiation. Min A to doff/don gown seated in chair req assist threading over B shoulders. Mod A to doff/don L sling with education provided. Min A for LB bathing seated/standing pt req Min A with B feet. Max A to doff/don socks. Pt noted urgent need for toileting. CGA for personal hygiene completed seated on toilet with L lateral lean noted. Throughout session pt limited per decreased cognition, fatigue and increased HR. Pt HR elevated to 148bpm at start of session seated in chair.      Balance  Sitting Balance: Supervision (pt tolerated approx 20 min seated unsupported in chair)  Standing Balance: Contact guard assistance  Standing Balance  Time: approx 5 min  Activity: static/dynamic standing during ADLs  Comment: without AD  Functional Mobility  Functional - Mobility Device: No device  Activity: To/from bathroom  Assist Level: Minimal assistance  Functional Mobility Comments: Assist for balance  Toilet Transfers  Toilet - Technique: Ambulating  Equipment Used: Standard toilet  Toilet Transfer: Minimal assistance  Toilet Transfers Comments: req cues on proper hand placement  Bed mobility  Comment: pt seated in chair at start/end of session  Transfers  Sit to stand: Minimal assistance  Stand to sit: Minimal assistance  Transfer Comments: without AD     Cognition  Overall Cognitive Status: Exceptions  Arousal/Alertness: Delayed responses to stimuli  Following Commands: Follows multistep commands with repitition; Follows multistep commands with increased time  Attention Span: Attends with cues to redirect  Problem Solving: Decreased awareness of errors; Assistance required to identify errors made; Assistance required to correct errors made  Insights: Decreased awareness of deficits  Initiation: Requires cues for some  Sequencing: Requires cues for some     Plan   Plan  Times per week: 4-6x/wk (MVR)    AM-PAC Score  AM-[facility] Inpatient Daily Activity Raw Score: 19 (12/13/21 1348)  AM-PAC Inpatient ADL T-Scale Score : 40.22 (12/13/21 1348)  ADL Inpatient CMS 0-100% Score: 42.8 (12/13/21 1348)  ADL Inpatient CMS G-Code Modifier : CK (12/13/21 1348)    Goals  Short term goals  Time Frame for Short term goals: Pt will by discharge  Short term goal 1: complete UB dressing with SBA, setup, AE, and modified tech PRN. Short term goal 2: complete LB ADLs with Min A, setup, AE, and modified tech PRN. Short term goal 3: compele functional transfers/mobility with SBA using LRD PRN. Short term goal 4: demo 10+ minutes static/dynamic standing tolerance with SBA using LRD PRN during functional tasks (updated by Montana Munoz on 12/10)  Short term goal 5: incorporate use of L UE during functional tasks to increase ROM and strength.        Therapy Time   Individual Concurrent Group Co-treatment   Time In 0840         Time Out 0934         Minutes 54         Timed Code Treatment Minutes: [address] [facility] Ifeoma Jurado

## 2021-12-13 NOTE — DISCHARGE INSTR - DIET

## 2021-12-13 NOTE — DISCHARGE INSTR - COC
Continuity of Care Form    Patient Name: Patricia Arriaza   :  1960  MRN:  [id number]    Admit date:  2021  Discharge date:  ***    Code Status Order: Full Code   Advance Directives:   Advance Care Flowsheet Documentation       Date/Time Healthcare Directive Type of Healthcare Directive Copy in [address] Agent's Name Healthcare Agent's Phone Number    [phone] Yes, patient has an advance directive for healthcare treatment Durable power of  for health care Yes, copy in chart Spouse -- --            Admitting Physician:  Joe Reed MD  PCP: Mayuri Gonzalez MD    Discharging Nurse: [facility] Unit/Room#: 1005/1005-01  Discharging Unit Phone Number: ***    Emergency Contact:   Extended Emergency Contact Information  Primary Emergency Contact: Howard Hernandez  Address: [address] [place] [address], [address] [place] [address]  Home Phone: [phone]  Relation: Spouse    Past Surgical History:  Past Surgical History:   Procedure Laterality Date    ABLATION OF DYSRHYTHMIC FOCUS  2020    Dr. Lisa Wiggisn  2021    R-L     CARDIOVERSION  2016    failed    CARDIOVERSION  2019     SECTION      COLONOSCOPY      ENDOSCOPY, COLON, DIAGNOSTIC      EYE SURGERY      Cataracts removed, bilaterally    MITRAL VALVE REPAIR N/A 2021    MITRAL VALVE REPLACE, MAZE, CLARIBEL performed by Joe Reed MD at [facility]      TRANSESOPHAGEAL ECHOCARDIOGRAM  10/05/2021    severe mitral regurg    WISDOM TOOTH EXTRACTION      WRIST GANGLION EXCISION Right        Immunization History:   Immunization History   Administered Date(s) Administered    COVID-19, Georgeana Crumble, Primary or Immunocompromised, PF, 100mcg/0.5mL 2021, 2021       Active Problems:  Patient Active Problem List   Diagnosis Code    Cerebrovascular accident (CVA) involving left middle cerebral artery [facility]) M81.779    Right sided weakness R53.1    Acute ischemic left internal carotid artery (ICA) stroke ([facility]) T14.299    Arterial ischemic stroke, MCA (middle cerebral artery), left, acute ([facility]) I63.512    Global aphasia R47.01    Mobility impaired Z74.09    Cerebral infarction due to occlusion of left carotid artery ([facility]) D72.328    Aphasia R47.01    Right hemiparesis ([facility]) G81.91    Atrial fibrillation status post cardioversion ([facility]) I48.91    Obstructive sleep apnea syndrome G47.33    Mitral valve insufficiency I34.0    Severe mitral regurgitation I34.0    S/P MVR (mitral valve replacement) Z95.2       Isolation/Infection:   Isolation            No Isolation          Patient Infection Status       Infection Onset Added Last Indicated Last Indicated By Review Planned Expiration Resolved Resolved By    None active    Resolved    COVID-19 (Rule Out) 06/08/20 06/08/20 06/08/20 COVID-19 (Ordered)   06/09/20 Rule-Out Test Resulted            Nurse Assessment:  Last Vital Signs: BP (!) 98/52   Pulse 74   Temp 98.1 °F (36.7 °C) (Oral)   Resp 19   Ht 5' 4\" (1.626 m)   Wt 167 lb 8.8 oz (76 kg)   SpO2 93%   BMI 28.76 kg/m²     Last documented pain score (0-10 scale): Pain Level: 0  Last Weight:   Wt Readings from Last 1 Encounters:   12/13/21 167 lb 8.8 oz (76 kg)     Mental Status:  alert, oriented      IV Access:  none    Nursing Mobility/ADLs:  Walking   CGA  Transfer  CGA  Bathing  1 assist  Dressing  1 assist  Toileting  1 assist  Feeding  self  Med Admin  self  Med Delivery   1 assist    Wound Care Documentation and Therapy:        Elimination:  Continence:    Bowel: {YES  Bladder: {YES   Urinary Catheter: n/a  Colostomy/Ileostomy/Ileal Conduit: n/a       Date of Last BM: 12/14/21    Intake/Output Summary (Last 24 hours) at 12/13/2021 1321  Last data filed at 12/13/2021 0855  Gross per 24 hour   Intake 240 ml   Output 18 ml   Net 222 ml     I/O last 3 completed shifts:  In: -   Out: 418 [Urine:400; Chest Tube:18]    Safety Concerns:     Fall Risk    Impairments/Disabilities:      Glasses    Nutrition Therapy:  Current Nutrition Therapy:   Reg diet    Routes of Feeding: oral  Liquids: thin  Daily Fluid Restriction: none  Last Modified Barium Swallow with Video (Video Swallowing Test): n/a    Treatments at the Time of Hospital Discharge:   Respiratory Treatments: none  Oxygen Therapy:  COPD, no Oxygen  Ventilator:    N/a    Rehab Therapies: Physical Therapy and Occupational Therapy  Weight Bearing Status/Restrictions: Full wt bearing  Other Medical Equipment (for information only, NOT a DME order) cane  Other Treatments: skilled RN    Patient's personal belongings (please select all that are sent with patient):  Duffel bag of clothes, phone, , glasses, personal medications    RN SIGNATURE:  Valente Snyder RN    CASE MANAGEMENT/SOCIAL WORK SECTION    Inpatient Status Date: ***    Readmission Risk Assessment Score:  Readmission Risk              Risk of Unplanned Readmission:  18           Discharging to Facility/ Agency   Name: [address]  Address:  [address] [place] [address]         Phone: [phone]       Fax: [phone]        Phone:  Fax:    Dialysis Facility (if applicable)   Name:  Address:  Dialysis Schedule:  Phone:  Fax:    / signature: Electronically signed by Clari Case RN on 12/13/21 at 3:42 PM EST    PHYSICIAN SECTION    Prognosis: Good    Condition at Discharge: Stable    Rehab Potential (if transferring to Rehab): Good    Recommended Labs or Other Treatments After Discharge: INR 12/17/21, BMP in 1 week  Skilled nursing cardio/pulmonary assessment EVERY shift/visit with vital signs and SaO2 call abnormal results to Surgeon's office  Notify Surgeon's office for temp >101.5 F  Daily weight and record.  Call for weight gain of 3 lbs in 3 days or 5-7lbs in 7 days    Monitor surgical incisions for signs of infection (redness, warmth, pain, purulent drainage, odor) and call with abnormal findings. Leave incisions open to air unless drainage is prohibitive. Call with any signs of separation or dehiscence. Shower daily with warm water and mild soap. Pat dry with clean towel, do not rub. Heart Hugger and Surgical bra on during daytime  Large breasted patients need to wear surgical or soft sports bra at all times to reduce tension on sternal incision  José Manuel hose on during day time, rinse and dry overnight to prevent infection of leg incisions    Strict Sternal Precautions: No lifting/pushing/pulling over 5lbs x 4 weeks, may increase 5-10 lbs per week after that as tolerated. Physical therapy BID, plus ambulation 3x per day in addition. Up in chair for all meals  Reinforce Cardiac diet and Coumadin restrictions, medication compliance and activity instructions    Patient needs to see Surgeon and Cardiologist within 2 weeks of discharge. Dr Maikol Hoffmann 12/22/2021 @ 11:00 AM  Resume Coumadin management with [address]. Repeat BMP in one week. Physician Certification: I certify the above information and transfer of Leo Meehan  is necessary for the continuing treatment of the diagnosis listed and that she requires Home Care for less 30 days.      Update Admission H&P: No change in H&P    PHYSICIAN SIGNATURE:  Electronically signed by Brigitte Wilson MD on 12/14/21 at 1:22 PM EST

## 2021-12-13 NOTE — PROGRESS NOTES
Atrial and ventricular wires clipped, sites cleansed w/betadine. Chest tube x 2 removed, steri-strips placed and covered w/dry gauze, abd and secured w/ paper tape, post ct removal cxr ordered for 4 hrs from now. Mid-sternal incision drsg removed, site c/d/i, well approximated w/no drainage, site cleansed w/betadine and island drsg applied.

## 2021-12-13 NOTE — PROGRESS NOTES
Physical Therapy  Facility/Department: [facility] CAR 1  Daily Treatment Note  NAME: Khoa Sanches  : 1960  MRN: [id number]    Date of Service: 2021    Discharge Recommendations:  Patient would benefit from continued therapy after discharge     PT Equipment Recommendations  Equipment Needed: No    Assessment     Body structures, Functions, Activity limitations: Decreased functional mobility ; Decreased posture; Decreased endurance; Decreased ROM; Decreased strength; Decreased balance; Decreased safe awareness  Assessment: Pt completed CGA for sit<>stand and gait trial 300 ft X 1 using hallway rails requiring,CGA. Pt will benefit from further PT in acute setting and post d/c facilitating return to functional IND. Prognosis: Good  Decision Making: High Complexity  REQUIRES PT FOLLOW UP: Yes  Activity Tolerance  Activity Tolerance: Patient limited by endurance; Patient limited by fatigue         Patient Diagnosis(es): There were no encounter diagnoses.      has a past medical history of Arm pain, Atrial fibrillation (HCC), Atrial fibrillation status post cardioversion [facility]), Atrial flutter ([facility] [address] Cancer ([facility] [address] Carotid artery stenosis, Cerebral artery occlusion with cerebral infarction [facility]), Chest pain, CHF (congestive heart failure) ([facility] [address] Chipped tooth, COPD (chronic obstructive pulmonary disease) ([facility] [address] Depression, Former tobacco use, H/O: CVA (cerebrovascular accident), Heart palpitations, History of lateral epicondylitis of right elbow, Hx of blood clots, Hyperlipidemia, Hyperparathyroidism ([facility] [address] Hypertensive chronic kidney disease with stage 1 through stage 4 chronic kidney disease, or unspecified chronic kidney disease, Long term current use of anticoagulant, Mitral regurgitation, Mitral valve insufficiency, Mitral valve regurgitation, Muscle spasm, Nerve pain, Osteoporosis, Personal history of other medical treatment, Radial tunnel syndrome, Restless legs, SOB (shortness of breath), Tennis elbow, Under care of team, Under care of team, Under care of team, Under care of team, and Wears glasses. has a past surgical history that includes  section; Wrist ganglion excision (Right); Cardioversion (2016); Cardioversion (2019); Colonoscopy; ablation of dysrhythmic focus (2020); transesophageal echocardiogram (10/05/2021); Cardiac catheterization (2021); Endoscopy, colon, diagnostic; eye surgery; skin biopsy; [place] tooth extraction; and Mitral valve repair (N/A, 2021). Restrictions  Restrictions/Precautions  Restrictions/Precautions: Fall Risk  Required Braces or Orthoses?: Yes  Required Braces or Orthoses  Other: Heart Hugger Brace  Left Upper Extremity Brace/Splint: Sling  Left Upper Extremity Brace/Splint: Sling 2* pace maker  Position Activity Restriction  Sternal Precautions: 5# Lifting Restrictions  Subjective   Pt sitting up in her chair. Pt has no c/opain. Orientation  Overall Orientation Status: Within Normal Limits     Objective     Transfers  Sit to Stand: Contact guard assistance  Stand to sit: Contact guard assistance  Ambulation  Ambulation?: Yes  Ambulation 1  Surface: level tile  Device: Goldstein rail 1/2 time  Assistance: Contact guard assistance  Quality of Gait: Slow/careful pace, mild shuffling, mild general unsteadiness  Gait Deviations: Decreased step length; Decreased step height; Slow [place]  Distance: 300 ft X 1  Comments: Pt ambulated on 2L 02. Stairs/Curb  Stairs?: Yes,  Pt ascended/descended 4 steps using one HR with a nonreciprocal pattern. CGA. Balance  Posture: Good  Sitting - Static: Good  Sitting - Dynamic: Good  Standing - Static: Fair+  Standing - Dynamic: Fair; -  Comments: Standing balance assessed with no AD. Rhenda Christine No ex's d/t pt's breakfast came.       Goals  Short term goals  Time Frame for Short term goals: 14 visits  Short term goal 1: Pt will ambulate 300 feet with least restrictive device and supervision to increase functional independence. Short term goal 2: Pt will tolerate a 30 minute therapy session to promote increased endurance. Short term goal 3: Pt will negotiate 1 stair with no handrail and SBA to allow the pt to enter prior living arrangements. Short term goal 4: Pt will demonstrate good- standing balance to decrease fall risk. Short term goal 5: Pt will perform sit<>stand transfer with supervision to increase functional independence.     Plan  Times per week: 6-7  Times per day:  (1-2x/day)  Current Treatment Recommendations: Strengthening, Transfer Training, Endurance Training, ROM, Balance Training, Gait Training, Functional Mobility Training, Stair training, Safety Education & Training, Home Exercise Program, Equipment Evaluation, Education, & procurement, Patient/Caregiver Education & Training  Safety Devices  Type of devices: Patient at risk for falls, Left in chair, Call light within reach, Gait belt, Nurse notified  Restraints  Initially in place: No     Therapy Time   Individual Concurrent Group Co-treatment   Time In   745         Time Out   815         Minutes   [address] [place] [address]

## 2021-12-13 NOTE — PLAN OF CARE
Problem: Falls - Risk of:  Goal: Will remain free from falls  Description: Will remain free from falls  12/12/2021 2243 by Sarah Guzman RN  Outcome: Ongoing  12/12/2021 1401 by Felice Friend RN  Outcome: Ongoing  12/12/2021 1400 by Felice Friend RN  Outcome: Ongoing  Goal: Absence of physical injury  Description: Absence of physical injury  12/12/2021 2243 by Sarah Guzman RN  Outcome: Ongoing  12/12/2021 1401 by Felice Friend RN  Outcome: Ongoing  12/12/2021 1400 by Felice Friend RN  Outcome: Ongoing     Problem: OXYGENATION/RESPIRATORY FUNCTION  Goal: Patient will maintain patent airway  12/12/2021 2243 by Sarah Guzman RN  Outcome: Ongoing  12/12/2021 1401 by Felice Friend RN  Outcome: Ongoing  12/12/2021 1400 by Felice Friend RN  Outcome: Ongoing  Goal: Patient will achieve/maintain normal respiratory rate/effort  Description: Respiratory rate and effort will be within normal limits for the patient  12/12/2021 2243 by Sarah Guzman RN  Outcome: Ongoing  12/12/2021 1401 by Felice Friend RN  Outcome: Ongoing  12/12/2021 1400 by Felice Friend RN  Outcome: Ongoing     Problem: SKIN INTEGRITY  Goal: Skin integrity is maintained or improved  12/12/2021 2243 by Sarah Guzman RN  Outcome: Ongoing  12/12/2021 1401 by Felice Friend RN  Outcome: Ongoing  12/12/2021 1400 by Felice Friend RN  Outcome: Ongoing     Problem: Discharge Planning:  Goal: Discharged to appropriate level of care  Description: Discharged to appropriate level of care  12/12/2021 2243 by Sarah Guzman RN  Outcome: Ongoing  12/12/2021 1401 by Felice Friend RN  Outcome: Ongoing  12/12/2021 1400 by Felice Friend RN  Outcome: Ongoing     Problem: Cardiac Output - Decreased:  Goal: Cardiac output within specified parameters  Description: Cardiac output within specified parameters  12/12/2021 2243 by Sarah Guzman RN  Outcome: Ongoing  12/12/2021 1401 by Felice Friend RN  Outcome: Ongoing  12/12/2021 1400 by Felice Friend RN  Outcome: Ongoing     Problem: Infection - Surgical Site:  Goal: Will show no infection signs and symptoms  Description: Will show no infection signs and symptoms  12/12/2021 2243 by Reji Armstrong RN  Outcome: Ongoing  12/12/2021 1401 by Jeramy Rosen RN  Outcome: Ongoing  12/12/2021 1400 by Jeramy Rosen RN  Outcome: Ongoing     Problem: Pain - Acute:  Goal: Pain level will decrease  Description: Pain level will decrease  12/12/2021 2243 by Reji Armstrong RN  Outcome: Ongoing  12/12/2021 1401 by Jeramy Rosen RN  Outcome: Ongoing  12/12/2021 1400 by Jeramy Rosen RN  Outcome: Ongoing     Problem: Venous Thromboembolism:  Goal: Will show no signs or symptoms of venous thromboembolism  Description: Will show no signs or symptoms of venous thromboembolism  12/12/2021 2243 by Reji Armstrong RN  Outcome: Ongoing  12/12/2021 1401 by Jeramy Rosen RN  Outcome: Ongoing  12/12/2021 1400 by Jeramy Rosen RN  Outcome: Ongoing  Goal: Absence of signs or symptoms of impaired coagulation  Description: Absence of signs or symptoms of impaired coagulation  12/12/2021 2243 by Reji Armstrong RN  Outcome: Ongoing  12/12/2021 1401 by Jeramy Rosen RN  Outcome: Ongoing  12/12/2021 1400 by Jeramy Rosen RN  Outcome: Ongoing     Problem: Skin Integrity:  Goal: Will show no infection signs and symptoms  Description: Will show no infection signs and symptoms  12/12/2021 2243 by Reji Armstrong RN  Outcome: Ongoing  12/12/2021 1401 by Jeramy Rosen RN  Outcome: Ongoing  12/12/2021 1400 by Jeramy Rosen RN  Outcome: Ongoing  Goal: Absence of new skin breakdown  Description: Absence of new skin breakdown  12/12/2021 2243 by Reji Armstrong RN  Outcome: Ongoing  12/12/2021 1401 by Jeramy Rosen RN  Outcome: Ongoing  12/12/2021 1400 by Jeramy Rosen RN  Outcome: Ongoing     Problem: Pain:  Goal: Pain level will decrease  Description: Pain level will decrease  12/12/2021 2243 by Reji Armstrong RN  Outcome: Ongoing  12/12/2021 1401 by Jeramy Rosen RN  Outcome: Ongoing  12/12/2021 1400 by Jeramy Rosen RN  Outcome: Ongoing  Goal: Control of acute pain  Description: Control of acute pain  12/12/2021 2243 by Malka Collins RN  Outcome: Ongoing  12/12/2021 1401 by Xin Niño RN  Outcome: Ongoing  12/12/2021 1400 by Xin Niño RN  Outcome: Ongoing  Goal: Control of chronic pain  Description: Control of chronic pain  12/12/2021 2243 by Malka Collins RN  Outcome: Ongoing  12/12/2021 1401 by Xin Niño RN  Outcome: Ongoing  12/12/2021 1400 by Xin Niño RN  Outcome: Ongoing

## 2021-12-13 NOTE — PROGRESS NOTES
Home Oxygen Evaluation    Home Oxygen Evaluation completed. Patient is on 2 liters per minute via NC. Resting SpO2 = 100%  Resting SpO2 on room air = 98%    SpO2 on room air with exercise = 93%  SpO2 on oxygen as above with exercise = 98%    Nocturnal Oximetry with patient on room air is recommended is SpO2 is between 89% and 95% (requires additional order).     Keli Kelly RCP  11:27 AM

## 2021-12-14 ENCOUNTER — APPOINTMENT (OUTPATIENT)
Dept: GENERAL RADIOLOGY | Age: 61
DRG: 220 | End: 2021-12-14
Attending: THORACIC SURGERY (CARDIOTHORACIC VASCULAR SURGERY)
Payer: MEDICARE

## 2021-12-14 VITALS
WEIGHT: 167.55 LBS | SYSTOLIC BLOOD PRESSURE: 116 MMHG | BODY MASS INDEX: 28.6 KG/M2 | DIASTOLIC BLOOD PRESSURE: 51 MMHG | HEART RATE: 85 BPM | OXYGEN SATURATION: 96 % | RESPIRATION RATE: 21 BRPM | TEMPERATURE: 97.7 F | HEIGHT: 64 IN

## 2021-12-14 LAB
ANION GAP SERPL CALCULATED.3IONS-SCNC: 10 MMOL/L (ref 9–17)
BUN BLDV-MCNC: 14 MG/DL (ref 8–23)
BUN/CREAT BLD: ABNORMAL (ref 9–20)
CALCIUM SERPL-MCNC: 8.3 MG/DL (ref 8.6–10.4)
CHLORIDE BLD-SCNC: 100 MMOL/L (ref 98–107)
CO2: 25 MMOL/L (ref 20–31)
CREAT SERPL-MCNC: 0.59 MG/DL (ref 0.5–0.9)
GFR AFRICAN AMERICAN: >60 ML/MIN
GFR NON-AFRICAN AMERICAN: >60 ML/MIN
GFR SERPL CREATININE-BSD FRML MDRD: ABNORMAL ML/MIN/{1.73_M2}
GFR SERPL CREATININE-BSD FRML MDRD: ABNORMAL ML/MIN/{1.73_M2}
GLUCOSE BLD-MCNC: 79 MG/DL (ref 70–99)
HCT VFR BLD CALC: 33.3 % (ref 36.3–47.1)
HEMOGLOBIN: 10.5 G/DL (ref 11.9–15.1)
INR BLD: 3.2
MAGNESIUM: 2 MG/DL (ref 1.6–2.6)
MCH RBC QN AUTO: 29.7 PG (ref 25.2–33.5)
MCHC RBC AUTO-ENTMCNC: 31.5 G/DL (ref 28.4–34.8)
MCV RBC AUTO: 94.3 FL (ref 82.6–102.9)
NRBC AUTOMATED: 1.6 PER 100 WBC
PDW BLD-RTO: 16.6 % (ref 11.8–14.4)
PLATELET # BLD: 211 K/UL (ref 138–453)
PMV BLD AUTO: 11.7 FL (ref 8.1–13.5)
POTASSIUM SERPL-SCNC: 3.9 MMOL/L (ref 3.7–5.3)
PROTHROMBIN TIME: 31.3 SEC (ref 9.1–12.3)
RBC # BLD: 3.53 M/UL (ref 3.95–5.11)
SODIUM BLD-SCNC: 135 MMOL/L (ref 135–144)
WBC # BLD: 8.4 K/UL (ref 3.5–11.3)

## 2021-12-14 PROCEDURE — 83735 ASSAY OF MAGNESIUM: CPT

## 2021-12-14 PROCEDURE — 6370000000 HC RX 637 (ALT 250 FOR IP): Performed by: NURSE PRACTITIONER

## 2021-12-14 PROCEDURE — 97110 THERAPEUTIC EXERCISES: CPT

## 2021-12-14 PROCEDURE — 36415 COLL VENOUS BLD VENIPUNCTURE: CPT

## 2021-12-14 PROCEDURE — 6360000002 HC RX W HCPCS: Performed by: NURSE PRACTITIONER

## 2021-12-14 PROCEDURE — 99024 POSTOP FOLLOW-UP VISIT: CPT | Performed by: NURSE PRACTITIONER

## 2021-12-14 PROCEDURE — 2580000003 HC RX 258: Performed by: NURSE PRACTITIONER

## 2021-12-14 PROCEDURE — 80048 BASIC METABOLIC PNL TOTAL CA: CPT

## 2021-12-14 PROCEDURE — 71045 X-RAY EXAM CHEST 1 VIEW: CPT

## 2021-12-14 PROCEDURE — 6370000000 HC RX 637 (ALT 250 FOR IP): Performed by: THORACIC SURGERY (CARDIOTHORACIC VASCULAR SURGERY)

## 2021-12-14 PROCEDURE — 85027 COMPLETE CBC AUTOMATED: CPT

## 2021-12-14 PROCEDURE — G0008 ADMIN INFLUENZA VIRUS VAC: HCPCS | Performed by: NURSE PRACTITIONER

## 2021-12-14 PROCEDURE — 97535 SELF CARE MNGMENT TRAINING: CPT

## 2021-12-14 PROCEDURE — 90686 IIV4 VACC NO PRSV 0.5 ML IM: CPT | Performed by: NURSE PRACTITIONER

## 2021-12-14 PROCEDURE — 99238 HOSP IP/OBS DSCHRG MGMT 30/<: CPT | Performed by: NURSE PRACTITIONER

## 2021-12-14 PROCEDURE — 94640 AIRWAY INHALATION TREATMENT: CPT

## 2021-12-14 PROCEDURE — 97116 GAIT TRAINING THERAPY: CPT

## 2021-12-14 PROCEDURE — 85610 PROTHROMBIN TIME: CPT

## 2021-12-14 RX ORDER — ASPIRIN 81 MG/1
81 TABLET ORAL DAILY
Qty: 30 TABLET | Refills: 3 | Status: SHIPPED | OUTPATIENT
Start: 2021-12-14

## 2021-12-14 RX ORDER — PANTOPRAZOLE SODIUM 40 MG/1
40 TABLET, DELAYED RELEASE ORAL DAILY
Qty: 30 TABLET | Refills: 0 | Status: SHIPPED | OUTPATIENT
Start: 2021-12-14

## 2021-12-14 RX ORDER — MIDODRINE HYDROCHLORIDE 5 MG/1
5 TABLET ORAL
Qty: 90 TABLET | Refills: 3 | Status: SHIPPED | OUTPATIENT
Start: 2021-12-14

## 2021-12-14 RX ORDER — FUROSEMIDE 20 MG/1
20 TABLET ORAL DAILY
Qty: 30 TABLET | Refills: 0 | Status: SHIPPED | OUTPATIENT
Start: 2021-12-14

## 2021-12-14 RX ORDER — OXYCODONE HYDROCHLORIDE AND ACETAMINOPHEN 5; 325 MG/1; MG/1
1 TABLET ORAL EVERY 4 HOURS PRN
Qty: 42 TABLET | Refills: 0 | Status: SHIPPED | OUTPATIENT
Start: 2021-12-14 | End: 2021-12-21

## 2021-12-14 RX ORDER — WARFARIN SODIUM 2 MG/1
2 TABLET ORAL
Status: DISCONTINUED | OUTPATIENT
Start: 2021-12-14 | End: 2021-12-14 | Stop reason: HOSPADM

## 2021-12-14 RX ADMIN — DOCUSATE SODIUM 100 MG: 100 CAPSULE ORAL at 08:51

## 2021-12-14 RX ADMIN — INFLUENZA A VIRUS A/VICTORIA/2570/2019 IVR-215 (H1N1) ANTIGEN (PROPIOLACTONE INACTIVATED), INFLUENZA A VIRUS A/CAMBODIA/E0826360/2020 IVR-224 (H3N2) ANTIGEN (PROPIOLACTONE INACTIVATED), INFLUENZA B VIRUS B/VICTORIA/705/2018 BVR-11 ANTIGEN (PROPIOLACTONE INACTIVATED), INFLUENZA B VIRUS B/PHUKET/3073/2013 BVR-1B ANTIGEN (PROPIOLACTONE INACTIVATED) 0.5 ML: 15; 15; 15; 15 INJECTION, SUSPENSION INTRAMUSCULAR at 09:27

## 2021-12-14 RX ADMIN — MIDODRINE HYDROCHLORIDE 5 MG: 5 TABLET ORAL at 07:40

## 2021-12-14 RX ADMIN — PANTOPRAZOLE SODIUM 40 MG: 40 TABLET, DELAYED RELEASE ORAL at 08:51

## 2021-12-14 RX ADMIN — ROPINIROLE HYDROCHLORIDE 1 MG: 1 TABLET, FILM COATED ORAL at 08:51

## 2021-12-14 RX ADMIN — SODIUM CHLORIDE, PRESERVATIVE FREE 10 ML: 5 INJECTION INTRAVENOUS at 09:32

## 2021-12-14 RX ADMIN — Medication 81 MG: at 08:51

## 2021-12-14 RX ADMIN — MIDODRINE HYDROCHLORIDE 5 MG: 5 TABLET ORAL at 11:20

## 2021-12-14 RX ADMIN — FUROSEMIDE 20 MG: 10 INJECTION, SOLUTION INTRAMUSCULAR; INTRAVENOUS at 08:59

## 2021-12-14 RX ADMIN — IPRATROPIUM BROMIDE AND ALBUTEROL SULFATE 1 AMPULE: .5; 2.5 SOLUTION RESPIRATORY (INHALATION) at 08:20

## 2021-12-14 RX ADMIN — POLYETHYLENE GLYCOL 3350 17 G: 17 POWDER, FOR SOLUTION ORAL at 08:54

## 2021-12-14 ASSESSMENT — ENCOUNTER SYMPTOMS
DIARRHEA: 0
SHORTNESS OF BREATH: 0
COLOR CHANGE: 0
COUGH: 0
ABDOMINAL PAIN: 0
CONSTIPATION: 0

## 2021-12-14 ASSESSMENT — PAIN SCALES - GENERAL
PAINLEVEL_OUTOF10: 0
PAINLEVEL_OUTOF10: 0

## 2021-12-14 NOTE — PROGRESS NOTES
Physical Therapy  Facility/Department: [facility] CAR 1  Daily Treatment Note  NAME: Gonzalo June  : 1960  MRN: [id number]    Date of Service: 2021    Discharge Recommendations:  Patient would benefit from continued therapy after discharge     PT Equipment Recommendations  Equipment Needed: No    Assessment     Body structures, Functions, Activity limitations: Decreased functional mobility ; Decreased posture; Decreased endurance; Decreased ROM; Decreased strength; Decreased balance; Decreased safe awareness  Assessment: Pt completed SBA for sit<>stand and gait trial 300 ft X 1 using SPC requiring,CGA. Pt will benefit from further PT in acute setting and post d/c facilitating return to functional IND. Prognosis: Good  Decision Making: High Complexity  REQUIRES PT FOLLOW UP: Yes  Activity Tolerance  Activity Tolerance: Patient limited by endurance; Patient limited by fatigue         Patient Diagnosis(es): The encounter diagnosis was S/P MVR (mitral valve replacement).      has a past medical history of Arm pain, Atrial fibrillation (HCC), Atrial fibrillation status post cardioversion [facility]), Atrial flutter ([facility] [address] Cancer ([facility] [address] Carotid artery stenosis, Cerebral artery occlusion with cerebral infarction [facility]), Chest pain, CHF (congestive heart failure) ([facility] [address] Chipped tooth, COPD (chronic obstructive pulmonary disease) ([facility] [address] Depression, Former tobacco use, H/O: CVA (cerebrovascular accident), Heart palpitations, History of lateral epicondylitis of right elbow, Hx of blood clots, Hyperlipidemia, Hyperparathyroidism ([facility] [address] Hypertensive chronic kidney disease with stage 1 through stage 4 chronic kidney disease, or unspecified chronic kidney disease, Long term current use of anticoagulant, Mitral regurgitation, Mitral valve insufficiency, Mitral valve regurgitation, Muscle spasm, Nerve pain, Osteoporosis, Personal history of other medical treatment, Radial tunnel syndrome, Restless legs, SOB (shortness of breath), Tennis elbow, Under care of team, Under care of team, Under care of team, Under care of team, and Wears glasses. has a past surgical history that includes  section; Wrist ganglion excision (Right); Cardioversion (2016); Cardioversion (2019); Colonoscopy; ablation of dysrhythmic focus (2020); transesophageal echocardiogram (10/05/2021); Cardiac catheterization (2021); Endoscopy, colon, diagnostic; eye surgery; skin biopsy; [place] tooth extraction; and Mitral valve repair (N/A, 2021). Restrictions  Restrictions/Precautions  Restrictions/Precautions: Fall Risk  Required Braces or Orthoses?: Yes  Required Braces or Orthoses  Other: Heart Hugger Brace  Left Upper Extremity Brace/Splint: Sling  Left Upper Extremity Brace/Splint: Sling 2* pace maker  Position Activity Restriction  Sternal Precautions: 5# Lifting Restrictions  Subjective   Pt sitting up in her chair. Pt has no c/opain. Orientation  Overall Orientation Status: Within Normal Limits     Objective     Transfers  Sit to Stand: SBA  Stand to sit: SBA  Ambulation  Ambulation?: Yes  Ambulation 1  Surface: level tile  Device: SPC  Assistance: SBA/Contact guard assistance  Quality of Gait: Slow/careful pace, mild shuffling, mild general unsteadiness  Gait Deviations: Decreased step length; Decreased step height; Slow [place]  Distance: 300 ft X 1    Stairs/Curb  Stairs?: Yes,   Balance  Posture: Good  Sitting - Static: Good  Sitting - Dynamic: Good  Standing - Static: Fair+  Standing - Dynamic: Fair; -  Comments: Standing balance assessed with a SPC. Francia Stands Ex's  Seated LE exercise program: [employer], heel/toe raises, and marches. Reps: 20 x each with 2 lb wt on B LE's. Goals  Short term goals  Time Frame for Short term goals: 14 visits  Short term goal 1: Pt will ambulate 300 feet with least restrictive device and supervision to increase functional independence.   Short term goal 2: Pt will tolerate a 30 minute therapy session to promote increased endurance. Short term goal 3: Pt will negotiate 1 stair with no handrail and SBA to allow the pt to enter prior living arrangements. Short term goal 4: Pt will demonstrate good- standing balance to decrease fall risk. Short term goal 5: Pt will perform sit<>stand transfer with supervision to increase functional independence.     Plan  Times per week: 6-7  Times per day:  (1-2x/day)  Current Treatment Recommendations: Strengthening, Transfer Training, Endurance Training, ROM, Balance Training, Gait Training, Functional Mobility Training, Stair training, Safety Education & Training, Home Exercise Program, Equipment Evaluation, Education, & procurement, Patient/Caregiver Education & Training  Safety Devices  Type of devices: Patient at risk for falls, Left in chair, Call light within reach, Gait belt, Nurse notified  Restraints  Initially in place: No     Therapy Time   Individual Concurrent Group Co-treatment   Time In   815         Time Out   845         Minutes   [address] [place] [address]

## 2021-12-14 NOTE — DISCHARGE SUMMARY
[facility] Cardiothoracic Surgery  Discharge Summary    Patient's Name/Date of Birth: Marion Flowers / 1960 (12 y.o.)    Admission Date: 12/7/2021  5:16 AM  Discharge Date: 12/14/2021 12:35 PM  Discharge Physician: Dr. Ellie Valentine  Discharge Unit: [facility]  Discharge condition: Stable  Disposition: Home with [address] [facility]      Reason For Admission: Mitral valve regurgitation    HPI: Marion Flowers is a 59 y.o.  female who presented to clinic with known mitral valve regurgitation.  Pertinent medical history includes atrial fibrillation with chronic anticoagulation, hx of CVA r/t carotid artery stenosis with right hemiparesis, aphasia and sleep apnea.  Cardiac workup revealed moderate to severe mitral regurgitation.  Patient admitted to increasing fatigue and mild dyspnea on exertion, denied chest pain and shortness of breath at rest.  She was referred for consideration of mitral valve repair/replace vs mitral clip. The decision was made that the patient would receive optimal benefit from surgical replacement. Procedures completed in hospital: Median sternotomy, aorto-bicaval cardiopulmonary bypass, transesophageal echocardiogram, mitral valve replacement with 27-mm Micheline-Mills Magna Ease valve, full left-sided atrial CryoMaze including pulmonary vein isolation and connection to the left atrial appendage as well as the isthmus of the mitral valve at P2, and left atrial appendage ligation with a 50-mm left atrial appendage occluder. Brief Review of Hospital Course:   Patient was admitted and underwent mitral valve replacement, MAZE and CLARIBEL with Dr Ellie Valentine on 12/7/2021. Surgical intervention went well without complication. During the post operative period, patient was found to be dependent on the temporary pacemaker with an underlying rhythm that was bradycardic which had been an issue prior to surgery as well. Therefore the patient received a permanent pacemaker on post op day three.  Patient continued to progress through the recovery period with appropriate consultations made as needed. She will be discharged home and return to clinic for a post-operative follow up. Review of Systems   Constitutional: Positive for activity change, appetite change and fatigue. Negative for chills and fever. Generalized fatigue and activity intolerance improving slowly. Decreased appetite has been a chronic issue   HENT: Negative for congestion. Eyes: Negative for visual disturbance. Respiratory: Negative for cough and shortness of breath. Cardiovascular: Negative for chest pain and leg swelling. Gastrointestinal: Negative for abdominal pain, constipation and diarrhea. Genitourinary: Negative for dysuria. Musculoskeletal: Negative for gait problem. Left arm in sling d/t post pacemaker placement restrictions   Skin: Positive for wound. Negative for color change. Neurological: Positive for weakness. Negative for dizziness. Psychiatric/Behavioral: Positive for sleep disturbance. Negative for suicidal ideas. The patient is not nervous/anxious. Difficulty sleeping d/t discomfort and restless leg, improving post resuming home medications       Physical Exam:  Vitals:    12/14/21 0850   BP: (!) 116/51   Pulse: 85   Resp: 21   Temp:    SpO2: 96%     Weight: Weight: 167 lb 8.8 oz (76 kg)    Weight: 145 lb 8.1 oz (66 kg)    No intake/output data recorded. General: alert and oriented to person, place and time, well-developed and well-nourished, in no acute distress. Up in chair, No apparent distress. Heart:Normal S1 and S2.  Regular rhythm, pacing on demand. No murmurs, gallops, or rubs. Pacing Wires Yes - clipped. Lungs: clear to auscultation bilaterally and diminished breath sounds bibasilar  Abdomen: soft, non tender, non distended, BSx4  Extremities: Trace edema  Wounds: clean and dry, healing appropriately.       Past Medical History:   Diagnosis Date    Arm pain Right and Left  Atrial fibrillation (HCC)     Atrial fibrillation status post cardioversion [facility])     Atrial flutter (HCC)     Cancer ([facility]     skin    Carotid artery stenosis     Cerebral artery occlusion with cerebral infarction [facility])     Chest pain 2003    CHF [facility]congestive heart failure) ([facility]     Chipped tooth 2021    Top left    COPD (chronic obstructive pulmonary disease) ([facility])     Depression     Former tobacco use     H/O: CVA (cerebrovascular accident)     2016    Heart palpitations     History of lateral epicondylitis of right elbow     And left elbow    Hx of blood clots     Hyperlipidemia     Hyperparathyroidism ([facility]     Hypertensive chronic kidney disease with stage 1 through stage 4 chronic kidney disease, or unspecified chronic kidney disease     Long term current use of anticoagulant     Mitral regurgitation     Mitral valve insufficiency     Mitral valve regurgitation     Muscle spasm     Nerve pain     Osteoporosis     Personal history of other medical treatment     Chronic diastolic congestive heart failure    Radial tunnel syndrome Right radial tunnel release     Restless legs     SOB (shortness of breath) 2021    Tennis elbow Right and left    Under care of team 2021    Cardiologist: Jennie Ford, last visit 2021    Under care of team 2021    PCP: Dr. Dewey Schwab [place], last visit 2021    Under care of team 2021    Neurology: Dr. Manual Opitz, last visit 2021    Under care of team 2021    Urology: Dr. Sandra Peralta, last visit 10/2021    Wears glasses      Past Surgical History:   Procedure Laterality Date    ABLATION OF DYSRHYTHMIC FOCUS  2020    Dr. Fanny Dixon  2021    R-L     CARDIOVERSION  2016    failed    CARDIOVERSION  2019     SECTION      COLONOSCOPY      ENDOSCOPY, COLON, DIAGNOSTIC      EYE SURGERY      Cataracts removed, bilaterally    MITRAL VALVE REPAIR N/A 12/7/2021    MITRAL VALVE REPLACE, MAZE, CLARIBEL performed by Megan [facility]ian [facility] at [facility] TRANSESOPHAGEAL ECHOCARDIOGRAM  10/05/2021    severe mitral regurg    WISDOM TOOTH EXTRACTION      WRIST GANGLION EXCISION Right      No Known Allergies  Family History   Problem Relation Age of Onset   Bearden Cancer Mother     No Known Problems Father     Cancer Sister     Peripheral Vascular Disease Sister      Social History     Socioeconomic History    Marital status:      Spouse name: Not on file    Number of children: Not on file    Years of education: Not on file    Highest education level: Not on file   Occupational History    Not on file   Tobacco Use    Smoking status: Former Smoker    Smokeless tobacco: Never Used   Vaping Use    Vaping Use: Never used   Substance and Sexual Activity    Alcohol use: No     Alcohol/week: 0.0 standard drinks    Drug use: No    Sexual activity: Not on file   Other Topics Concern    Not on file   Social History Narrative    Not on file     Social Determinants of Health     Financial Resource Strain:     Difficulty of Paying Living Expenses: Not on file   Food Insecurity:     Worried About Running Out of Food in the Last Year: Not on file    Javier of Food in the Last Year: Not on file   Transportation Needs:     Lack of Transportation (Medical): Not on file    Lack of Transportation (Non-Medical):  Not on file   Physical Activity:     Days of Exercise per Week: Not on file    Minutes of Exercise per Session: Not on file   Stress:     Feeling of Stress : Not on file   Social Connections:     Frequency of Communication with Friends and Family: Not on file    Frequency of Social Gatherings with Friends and Family: Not on file    Attends Scientologist Services: Not on file    Active Member of Clubs or Organizations: Not on file    Attends Club or Organization Meetings: Not on file    Marital Status: Not on file   Intimate Partner Violence:     Fear of Current or Ex-Partner: Not on file    Emotionally Abused: Not on file    Physically Abused: Not on file    Sexually Abused: Not on file   Housing Stability:     Unable to Pay for Housing in the Last Year: Not on file    Number of Katherin in the Last Year: Not on file    Unstable Housing in the Last Year: Not on file          Medication List      START taking these medications    aspirin 81 MG EC tablet  Take 1 tablet by mouth daily     metoprolol tartrate 25 MG tablet  Commonly known as: LOPRESSOR  Take 0.5 tablets by mouth 2 times daily     midodrine 5 MG tablet  Commonly known as: PROAMATINE  Take 1 tablet by mouth 3 times daily (with meals)     oxyCODONE-acetaminophen 5-325 MG per tablet  Commonly known as: PERCOCET  Take 1 tablet by mouth every 4 hours as needed for Pain for up to 7 days.      pantoprazole 40 MG tablet  Commonly known as: PROTONIX  Take 1 tablet by mouth daily        CHANGE how you take these medications    furosemide 20 MG tablet  Commonly known as: Lasix  Take 1 tablet by mouth daily  What changed:   · medication strength  · how much to take  · when to take this        CONTINUE taking these medications    ADVAIR HFA IN     alendronate 70 MG tablet  Commonly known as: FOSAMAX     atorvastatin 10 MG tablet  Commonly known as: LIPITOR     baclofen 20 MG tablet  Commonly known as: LIORESAL     Calcium 600+D 600-800 MG-UNIT Tabs  Generic drug: Calcium Carb-Cholecalciferol     clonazePAM 1 MG tablet  Commonly known as: KlonoPIN  Take 1 po qhs     COUMADIN PO     desvenlafaxine succinate 100 MG Tb24 extended release tablet  Commonly known as: PRISTIQ     fluticasone 50 MCG/ACT nasal spray  Commonly known as: FLONASE     gabapentin 800 MG tablet  Commonly known as: NEURONTIN  TAKE ONE TABLET BY MOUTH EVERY NIGHT AT BEDTIME     KLOR-CON M20 PO     rOPINIRole 1 MG tablet  Commonly known as: REQUIP  TAKE 1 TABLET BY MOUTH UPON RISING, 3 TABLET BY MOUTH  AT 6 PM AND 3 TABLETS BY  MOUTH AT BEDTIME        STOP taking these medications    enoxaparin 120 MG/0.8ML injection  Commonly known as: Lovenox           Where to Get Your Medications      [facility], [address] [place] [address] [phone] Carla Aceves [phone]  [place] [address]    Phone: [phone]   · aspirin 81 MG EC tablet  · furosemide 20 MG tablet  · metoprolol tartrate 25 MG tablet  · midodrine 5 MG tablet  · oxyCODONE-acetaminophen 5-325 MG per tablet  · pantoprazole 40 MG tablet           Data:    CBC:   No results for input(s): WBC, HGB, HCT, MCV, PLT in the last 72 hours. BMP:   No results for input(s): NA, K, CL, CO2, PHOS, BUN, CREATININE, MG in the last 72 hours. Invalid input(s): CA  Accucheck Glucoses:   No results for input(s): POCGLU in the last 72 hours. Cardiac Enzymes: No results for input(s): CKTOTAL, CKMB, CKMBINDEX, TROPONINI in the last 72 hours. PTT/PT/INR:   No results for input(s): PROTIME, INR in the last 72 hours. No results for input(s): APTT in the last 72 hours.   Liver Profile:  Lab Results   Component Value Date    AST 31 12/01/2021    ALT 33 12/01/2021    BILITOT 0.37 12/01/2021    ALKPHOS 95 12/01/2021     Lab Results   Component Value Date    CHOL 181 01/25/2016    HDL 52 01/25/2016    TRIG 92 01/25/2016     TSH: No results found for: TSH  UA:   Lab Results   Component Value Date    COLORU Yellow 12/01/2021    PHUR 6.5 12/01/2021    WBCUA 0 TO 2 01/24/2016    RBCUA 0 TO 2 01/24/2016    MUCUS 1+ 01/24/2016    TRICHOMONAS NOT REPORTED 01/24/2016    YEAST NOT REPORTED 01/24/2016    BACTERIA FEW 01/24/2016    SPECGRAV 1.010 12/01/2021    LEUKOCYTESUR NEGATIVE 12/01/2021    UROBILINOGEN Normal 12/01/2021    BILIRUBINUR NEGATIVE 12/01/2021    GLUCOSEU NEGATIVE 12/01/2021    AMORPHOUS NOT REPORTED 01/24/2016       Problem List Items Addressed This Visit     S/P MVR (mitral valve replacement) - Primary (Chronic)    Relevant Medications    oxyCODONE-acetaminophen (PERCOCET) 5-325 MG per tablet    Other Relevant Orders    Basic Metabolic Panel          Imaging Studies:  Cardiac Cath:    Upper normal right heart pressure   Preserved LV function   Normal coronary arteries      Recommendations      Mitral clip evaluation based on SAUL and severity of MR      Signature      ----------------------------------------------------------------   Electronically signed by Marnie Aburto(Performing Physician) on   11/08/2021 11:45   ----------------------------------------------------------------      Angiographic Findings      Cardiac Arteries and Lesion Findings     LMCA: Normal 0% stenosis.     LAD: Normal 0% stenosis.     LCx: Normal 0% stenosis.     RCA: Normal 0% stenosis.      Coronary Tree      Dominance: Right     LV Analysis  LV function assessed as:Normal.  Ejection Fraction  +----------------------------------------------------------------------+---+  ! Method                                                                ! EF%! +----------------------------------------------------------------------+---+  ! LV gram                                                               !54 !  +----------------------------------------------------------------------+---+    CXR:  ONE XRAY VIEW OF THE CHEST       12/14/2021 5:59 am       COMPARISON:   12/13/2021       HISTORY:   ORDERING SYSTEM PROVIDED HISTORY: Post op CABG   TECHNOLOGIST PROVIDED HISTORY:   Post op CABG   Reason for Exam: Upright port, CABG       FINDINGS:   Left-sided transvenous pacer in place and surgical clips overlying the left   chest.  Status post valve replacement.  There is cardiomegaly with pulmonary   edema and bilateral pleural effusions unchanged.  The previously Slee seen   left apical pneumothorax is not seen on the current exam.           Impression   Stable pulmonary edema       Nonvisualization of the previously seen left apical pneumothorax.           CT:    CT OF THE CHEST WITHOUT CONTRAST 12/3/2021 8:48 am       TECHNIQUE:   CT of the chest was performed without the administration of intravenous   contrast. Multiplanar reformatted images are provided for review. Dose   modulation, iterative reconstruction, and/or weight based adjustment of the   mA/kV was utilized to reduce the radiation dose to as low as reasonably   achievable.       COMPARISON:   None.       HISTORY:   ORDERING SYSTEM PROVIDED HISTORY: Mitral valve insufficiency, unspecified   etiology   TECHNOLOGIST PROVIDED HISTORY:   pre-op open heart for valve repair/replace       FINDINGS:   Mediastinum: Thoracic aorta demonstrates minimal calcification without   aneurysm.  Pulmonary trunk appears nondilated.  No pericardial effusion.  No   lymphadenopathy.  Partially calcified mediastinal and right hilar lymph   nodes.  The esophagus is grossly unremarkable.       Lungs/pleura: Mild lung scarring.  No focal consolidation, pneumothorax or   pleural effusion.  Partially calcified granuloma upper lobe.     Upper Abdomen: Hyperdense liver.  No acute findings.       Soft Tissues/Bones: Visualized soft tissues surrounding chest wall   demonstrate no acute findings.  Osseous structures demonstrate degenerative   change.           Impression   1.  No acute intra-abdominal process. 2. Evidence of old granulomas disease. 3. Partially visualized hyperdense liver.  Please correlate with history of   amiodarone use. Echo:   Global left ventricular systolic function appears moderately reduced. Estimated ejection fraction is 35 % . Calculated EF via Vazquez's method is 38 %. Calculated EF via 3D Heart Model is 33 %. Leftward compression of inter-ventricular septum (\"D-sign\") indicating RV  pressure/volume overload. The right ventricle appears mildly dilated. Aortic valve structure and function normal.  Aortic valve is trileaflet. Trivial aortic insufficiency. No aortic stenosis.   A 27 mm Magna Ease bioprosthetic valve is seen in the mitral position. At least mild mitral regurgitation. Normal tricuspid valve leaflets. Moderate to severe tricuspid regurgitation. Mild pulmonary hypertension with an estimated right ventricular systolic  pressure of 46 mmHg. No pericardial effusion.     Signature  ----------------------------------------------------------------------------   Electronically signed by Talita Harrison(Sonographer) on 12/08/2021 05:58 PM  ----------------------------------------------------------------------------     ----------------------------------------------------------------------------   Electronically signed by Fly Craig(Interpreting physician) on 12/09/2021   04:18 PM  ----------------------------------------------------------------------------  FINDINGS     Left Ventricle  Global left ventricular systolic function appears moderately reduced. Estimated ejection fraction is 35 % . Calculated EF via Vazquez's method is 38 %. Calculated EF via 3D Heart Model is 33 %. Leftward compression of inter-ventricular septum (\"D-sign\") indicating RV  pressure/volume overload.     Right Ventricle  The right ventricle appears mildly dilated. Mitral Valve  A 27 mm Magna Ease bioprosthetic valve is seen in the mitral position. At least mild mitral regurgitation. Aortic Valve  Aortic valve structure and function normal.  Aortic valve is trileaflet. Trivial aortic insufficiency. No aortic stenosis. Tricuspid Valve  Normal tricuspid valve leaflets. Moderate to severe tricuspid regurgitation. Mild pulmonary hypertension with an estimated right ventricular systolic  pressure of 46 mmHg.     Pericardial Effusion  No pericardial effusion. Discharge Plan:  Follow up with CT Surgery  in 1 week. Call office at [phone] for any problems. Follow up with PCP and cardiology in 1-2 weeks.    Resume Coumadin management with [facility] as established prior to arrival.   Repeat INR in 3 days, and BMP in one week.       Patient was discharged on Aspirin, Coumadin, ACE-I, BB, and Statin therapy per protocol.       SARAH Russell - CNP, CNP  Phone: [phone]

## 2021-12-14 NOTE — PLAN OF CARE
Problem: Falls - Risk of:  Goal: Will remain free from falls  Description: Will remain free from falls  12/14/2021 0332 by Kevon Prakash RN  Outcome: Ongoing  12/13/2021 1338 by Johanna Duenas RN  Outcome: Ongoing  Goal: Absence of physical injury  Description: Absence of physical injury  12/14/2021 0332 by Kevon Prakash RN  Outcome: Ongoing  12/13/2021 1338 by Johanna Duenas RN  Outcome: Ongoing     Problem: OXYGENATION/RESPIRATORY FUNCTION  Goal: Patient will maintain patent airway  12/14/2021 0332 by Kevon Prakash RN  Outcome: Ongoing  12/13/2021 1338 by Johanna Duenas RN  Outcome: Ongoing  Goal: Patient will achieve/maintain normal respiratory rate/effort  Description: Respiratory rate and effort will be within normal limits for the patient  12/14/2021 0332 by Kevon Prakash RN  Outcome: Ongoing  12/13/2021 1338 by Johanna Duenas RN  Outcome: Ongoing     Problem: SKIN INTEGRITY  Goal: Skin integrity is maintained or improved  12/14/2021 0332 by Kevon Prakash RN  Outcome: Ongoing  12/13/2021 1338 by Johanna Duenas RN  Outcome: Ongoing     Problem: Discharge Planning:  Goal: Discharged to appropriate level of care  Description: Discharged to appropriate level of care  12/14/2021 0332 by Kevon Prakash RN  Outcome: Ongoing  12/13/2021 1338 by Johanna Duenas RN  Outcome: Ongoing     Problem: Cardiac Output - Decreased:  Goal: Cardiac output within specified parameters  Description: Cardiac output within specified parameters  12/14/2021 0332 by Kevon Prakash RN  Outcome: Ongoing  12/13/2021 1338 by Johanna Duenas RN  Outcome: Ongoing     Problem: Infection - Surgical Site:  Goal: Will show no infection signs and symptoms  Description: Will show no infection signs and symptoms  12/14/2021 0332 by Kevon Prakash RN  Outcome: Ongoing  12/13/2021 1338 by Johanna Duenas RN  Outcome: Ongoing     Problem: Pain - Acute:  Goal: Pain level will decrease  Description: Pain level will decrease  12/14/2021 0332 by Chantal Peralta RN  Outcome: Ongoing  12/13/2021 1338 by Myranda Bond RN  Outcome: Ongoing     Problem: Venous Thromboembolism:  Goal: Will show no signs or symptoms of venous thromboembolism  Description: Will show no signs or symptoms of venous thromboembolism  12/14/2021 0332 by Chantal Peralta RN  Outcome: Ongoing  12/13/2021 1338 by Myranda Bond RN  Outcome: Ongoing  Goal: Absence of signs or symptoms of impaired coagulation  Description: Absence of signs or symptoms of impaired coagulation  12/14/2021 0332 by Chantal Peralta RN  Outcome: Ongoing  12/13/2021 1338 by Myranda Bond RN  Outcome: Ongoing     Problem: Skin Integrity:  Goal: Will show no infection signs and symptoms  Description: Will show no infection signs and symptoms  12/14/2021 0332 by Chantal Peralta RN  Outcome: Ongoing  12/13/2021 1338 by Myranda Bond RN  Outcome: Ongoing  Goal: Absence of new skin breakdown  Description: Absence of new skin breakdown  12/14/2021 0332 by Chantal Peralta RN  Outcome: Ongoing  12/13/2021 1338 by Myranda Bond RN  Outcome: Ongoing     Problem: Pain:  Goal: Pain level will decrease  Description: Pain level will decrease  12/14/2021 0332 by Chantal Peralta RN  Outcome: Ongoing  12/13/2021 1338 by Myranda Bond RN  Outcome: Ongoing  Goal: Control of acute pain  Description: Control of acute pain  12/14/2021 0332 by Chantal Peralta RN  Outcome: Ongoing  12/13/2021 1338 by Myranda Bond RN  Outcome: Ongoing  Goal: Control of chronic pain  Description: Control of chronic pain  12/14/2021 0332 by Chantal Peralta RN  Outcome: Ongoing  12/13/2021 1338 by Myranda Bond RN  Outcome: Ongoing

## 2021-12-14 NOTE — PROGRESS NOTES
[facility]   Progress Note                    Date:   12/14/2021  Patient name:  Paolo Martinez  Date of admission:  12/7/2021  5:16 AM  MRN:   [id number]  YOB: 1960  PCP:    Arlin Maldonado MD    Reason for Admission:  Severe mitral regurgitation [I34.0]    Subjective:      Clinical Changes / Abnormalities:    Patient seen and examined at bedside. No acute events overnight. No chest pain or shortness of breath.       Urine output in the last 24 hours:     Intake/Output Summary (Last 24 hours) at 12/14/2021 0724  Last data filed at 12/14/2021 0640  Gross per 24 hour   Intake 730 ml   Output 202 ml   Net 528 ml     I/O since admission: -777 cc    Medications:   Scheduled Meds:   rOPINIRole  1 mg Oral Daily    rOPINIRole  2 mg Oral BID    gabapentin  800 mg Oral Nightly    furosemide  20 mg IntraVENous Daily    sodium chloride flush  5-40 mL IntraVENous 2 times per day    sodium chloride flush  5-40 mL IntraVENous 2 times per day    midodrine  5 mg Oral TID WC    [Held by provider] metoprolol tartrate  12.5 mg Oral BID    atorvastatin  10 mg Oral Nightly    docusate sodium  100 mg Oral BID    senna  1 tablet Oral Nightly    warfarin (COUMADIN) daily dosing (placeholder)   Other RX Placeholder    insulin lispro  0-6 Units SubCUTAneous TID WC    insulin lispro  0-3 Units SubCUTAneous Nightly    influenza virus vaccine  0.5 mL IntraMUSCular Prior to discharge    baclofen  20 mg Oral Nightly    clonazePAM  1 mg Oral Nightly    sodium chloride flush  10 mL IntraVENous 2 times per day    aspirin  81 mg Oral Daily    polyethylene glycol  17 g Oral Daily    pantoprazole  40 mg Oral Daily    ipratropium-albuterol  1 ampule Inhalation Q4H WA     Continuous Infusions:   sodium chloride      sodium chloride      sodium chloride      dextrose       CBC:   Recent Labs     12/12/21  0325 12/13/21  0532 12/14/21  0447   WBC 8.8 8.0 8.4   HGB 10.9* 10.3* 10.5*    166 211     BMP:    Recent Labs 12/12/21 0325 12/13/21  0532 12/14/21 0447    136 135   K 3.8 3.7 3.9    103 100   CO2 20 22 25   BUN 28* 17 14   CREATININE 0.93* 0.66 0.59   GLUCOSE 89 80 79     Hepatic: No results for input(s): AST, ALT, ALB, BILITOT, ALKPHOS in the last 72 hours. Troponin: No results for input(s): TROPONINI in the last 72 hours. No results for input(s): TROPONINT in the last 72 hours. BNP: No results for input(s): PROBNP in the last 72 hours. No results for input(s): BNP in the last 72 hours. Lipids: No results for input(s): CHOL, HDL in the last 72 hours. Invalid input(s): LDLCALCU  INR:   Recent Labs     12/12/21 0325 12/13/21  0532 12/14/21 0447   INR 1.8 3.2 3.2       Objective:   Vitals: BP (!) 90/58   Pulse 65   Temp 97.4 °F (36.3 °C) (Oral)   Resp 16   Ht 5' 4\" (1.626 m)   Wt 167 lb 8.8 oz (76 kg)   SpO2 100%   BMI 28.76 kg/m²    Constitutional and General Appearance:   alert, cooperative, no distress and appears stated age  Respiratory:  No for increased work of breathing. On auscultation: clear to auscultation bilaterally  Cardiovascular: The apical impulse is not displaced  Heart tones are crisp and normal. Regular S1 and S2. No murmurs. Abdomen:   No masses or tenderness  Bowel sounds present  Extremities:   No Cyanosis or Clubbing   Lower extremity edema: No   Skin: Warm and dry  Neurological:  Alert and oriented. Diagnostic Studies:         ECHO: 12/8/21  Summary  Global left ventricular systolic function appears moderately reduced. Estimated ejection fraction is 35 % . Calculated EF via Vazquez's method is 38 %. Calculated EF via 3D Heart Model is 33 %. Leftward compression of inter-ventricular septum (\"D-sign\") indicating RV  pressure/volume overload. The right ventricle appears mildly dilated. Aortic valve structure and function normal.  Aortic valve is trileaflet. Trivial aortic insufficiency. No aortic stenosis.   A 27 mm Magna Ease bioprosthetic valve is [phone]

## 2021-12-14 NOTE — CARE COORDINATION
Met with patient and  to discuss transitional planning. Plan is home with , they'd like referral to [facility], referral faxed. May need walker    1311 call from helen at [facility], they cannot accommodate patient.     1430 patient and  updated, [facility] [place], [facility]. choice is [facility], referral faxed
Met with patient and , they are declining SNF, plan is home with , await HC choice
Met with patient, plan is home with  and [facility]. Tried to call [facility], placed on hold, will fax paperwork when ready.   Patient would like cane, referral faxed to [facility] per patient request, will have them deliver to her home, provided her with the number to call to arrange delivery    [phone] spoke with Joselin Tate at [facility], let her know patient will discharge today, needs INR on   Discharge 1 [facility] Case Management Department  Written by: Noah Mckeon RN    Patient Name: Morro Gloria  Attending Provider: Carter Rider MD  Admit Date: 2021  5:16 AM  MRN: [id number]  Account: [de-identified]                     : 1960  Discharge Date:  2021        Disposition: home    Noah Mckeon RN
Oralia Carmona was evaluated today and a DME order was entered for a wheeled walker because she requires this to successfully complete daily living tasks of eating, bathing, toileting, personal cares, ambulating, grooming, hygiene, dressing upper body, dressing lower body, meal preparation and taking own medications. A wheeled walker is necessary due to the patient's unsteady gait, upper body weakness, and inability to  an ambulation device; and she can ambulate only by pushing a walker instead of a lesser assistive device such as a cane, crutch, or standard walker. The need for this equipment was discussed with the patient and she understands and is in agreement.     Sammie Tong, APRN - CNP
plan      Discharge Plan: home with , await [facility]. choice          Electronically signed by Shakeel Rod RN on 12/8/21 at 4:41 PM EST

## 2021-12-14 NOTE — PROGRESS NOTES
Occupational Therapy  Facility/Department: [facility]  Daily Treatment Note  NAME: Jovanni Ngo  : 1960  MRN: [id number]    Date of Service: 2021    Discharge Recommendations:  Patient would benefit from continued therapy after discharge. Patient is currently unsafe to return to prior living environment at this time without 24 hr assistance d/t functional deficits impacting patient's performance and safety with ADLs and functional tasks. OT Equipment Recommendations  Equipment Needed: Yes  Mobility Devices: ADL Assistive Devices  ADL Assistive Devices: Shower Chair with back    Assessment   Performance deficits / Impairments: Decreased functional mobility ; Decreased ADL status; Decreased high-level IADLs; Decreased ROM; Decreased safe awareness; Decreased balance; Decreased cognition  Assessment: Pt sitting in bedside recliner, retired to bedside recliner following functional task engagement. OT educated on use of Incentive spirometer, pt demonstrated good understanding w/ X5 repetitions completed. Pt verbalized understanding of usage. OT educated on EC/WS tech w/ direct connections to daily roles and routines, pt verbalized understanding. Pt will benefit from continued acute OT services to address deficits impacting performance in ADLs/IADLs/functional tasks. Prognosis: Good  Patient Education: OT role, OT POC, transfer training, EC/WS tech, Incentive spirometer, doffing/donning sling, ADL adaptive training - good return  REQUIRES OT FOLLOW UP: Yes  Activity Tolerance  Activity Tolerance: Patient Tolerated treatment well  Safety Devices  Safety Devices in place: Yes  Type of devices: Gait belt; Call light within reach; Left in chair; Nurse notified  Restraints  Initially in place: No         Patient Diagnosis(es): The encounter diagnosis was S/P MVR (mitral valve replacement).     has a past medical history of Arm pain, Atrial fibrillation ([address] Atrial fibrillation status post cardioversion [facility]), Atrial flutter ([facility] [address] Cancer ([facility] [address] Carotid artery stenosis, Cerebral artery occlusion with cerebral infarction [facility]), Chest pain, CHF (congestive heart failure) ([facility] [address] Chipped tooth, COPD (chronic obstructive pulmonary disease) ([facility] [address] Depression, Former tobacco use, H/O: CVA (cerebrovascular accident), Heart palpitations, History of lateral epicondylitis of right elbow, Hx of blood clots, Hyperlipidemia, Hyperparathyroidism ([facility] [address] Hypertensive chronic kidney disease with stage 1 through stage 4 chronic kidney disease, or unspecified chronic kidney disease, Long term current use of anticoagulant, Mitral regurgitation, Mitral valve insufficiency, Mitral valve regurgitation, Muscle spasm, Nerve pain, Osteoporosis, Personal history of other medical treatment, Radial tunnel syndrome, Restless legs, SOB (shortness of breath), Tennis elbow, Under care of team, Under care of team, Under care of team, Under care of team, and Wears glasses. has a past surgical history that includes  section; Wrist ganglion excision (Right); Cardioversion (2016); Cardioversion (2019); Colonoscopy; ablation of dysrhythmic focus (2020); transesophageal echocardiogram (10/05/2021); Cardiac catheterization (2021); Endoscopy, colon, diagnostic; eye surgery; skin biopsy; [place] tooth extraction; and Mitral valve repair (N/A, 2021).     Restrictions  Restrictions/Precautions  Restrictions/Precautions: General Precautions  Required Braces or Orthoses?: Yes  Implants present? : Pacemaker  Required Braces or Orthoses  Left Upper Extremity Brace/Splint: Sling (Sling in place to left arm following pacemaker placement)  Left Upper Extremity Brace/Splint: Sling 2* pace maker  Position Activity Restriction  Sternal Precautions: 5# Lifting Restrictions  Other position/activity restrictions: Ambulate patient; activity as tolerated; Per RN and chart, patient no longer has orders for heart hugger, hasn't donned in 48 hours, RN facilitated doffing/donning sling on L UE at SBA w/ vc. Pt demonstrated good carryover from previous education. OT facilitated beverage managemet Ind. Balance  Sitting Balance: Supervision (~30 min sitting in bedside recliner; intermittent dynamic for ADLs)  Standing Balance: Stand by assistance  Standing Balance  Time: ~10 min  Activity: Dynamic, sinkside  Comment: Pt demonstrated good balance while standing, able to reach outside ELIN to clean off countertop, organize items, and retrieve items. Functional Mobility  Functional - Mobility Device: No device  Activity: To/from bathroom  Assist Level: Contact guard assistance  Functional Mobility Comments: Pt provided hand held assist when needed, declined use of RW, no LOB noted throughout  Toilet Transfers  Toilet - Technique: Ambulating  Equipment Used: Standard toilet  Toilet Transfer: Minimal assistance  Toilet Transfers Comments: Pt required Min A w/ hand held assist d/t no grab bar on R side  Bed mobility  Supine to Sit: Unable to assess  Sit to Supine: Unable to assess  Scooting: Stand by assistance  Comment: Pt in bedside chair upon arrival, retired to chair at end. Pt demonstrated good scooting in bedside recliner  Transfers  Sit to stand: Contact guard assistance  Stand to sit: Contact guard assistance  Transfer Comments: Pt required vc for hand placement w/ transfers. Cognition  Following Commands: Follows multistep commands with repitition;  Follows multistep commands with increased time  Safety Judgement: Decreased awareness of need for assistance  Problem Solving: Assistance required to identify errors made  Insights: Decreased awareness of deficits      Plan   Plan  Times per week: 4-6x/wk (MVR)    AM-PAC Score  AM-[facility] Inpatient Daily Activity Raw Score: 20 (12/14/21 1514)  AM-PAC Inpatient ADL T-Scale Score : 42.03 (12/14/21 1514)  ADL Inpatient CMS 0-100% Score: 38.32 (12/14/21 1514)  ADL Inpatient CMS G-Code Modifier : Doris Salvador (12/14/21 1514)    Goals  Short term goals  Time Frame for Short term goals: Pt will by discharge  Short term goal 1: complete UB dressing with SBA, setup, AE, and modified tech PRN. Short term goal 2: complete LB ADLs with Min A, setup, AE, and modified tech PRN. Short term goal 3: compele functional transfers/mobility with SBA using LRD PRN. Short term goal 4: demo 10+ minutes static/dynamic standing tolerance with SBA using LRD PRN during functional tasks (updated by Patrizia Moore on 12/10)  Short term goal 5: incorporate use of L UE during functional tasks to increase ROM and strength.        Therapy Time   Individual Concurrent Group Co-treatment   Time In 1658         Time Out 1025         Minutes 44         Timed Code Treatment Minutes: [address]ias, S/OT

## 2021-12-14 NOTE — PROGRESS NOTES
Pt taken per wheelchair to awaiting vehicle, all belongings gathered by spouse, open heart teaching done by Jeremías Santamaria RN charge nurse, AVS reviewed by this RN, dressed, IV out, monitor off.

## 2021-12-22 ENCOUNTER — OFFICE VISIT (OUTPATIENT)
Dept: CARDIOTHORACIC SURGERY | Age: 61
End: 2021-12-22

## 2021-12-22 VITALS
RESPIRATION RATE: 20 BRPM | WEIGHT: 160 LBS | SYSTOLIC BLOOD PRESSURE: 98 MMHG | TEMPERATURE: 98.4 F | OXYGEN SATURATION: 96 % | BODY MASS INDEX: 24.25 KG/M2 | DIASTOLIC BLOOD PRESSURE: 53 MMHG | HEART RATE: 71 BPM | HEIGHT: 68 IN

## 2021-12-22 DIAGNOSIS — Z98.890 S/P MVR (MITRAL VALVE REPAIR): Primary | ICD-10-CM

## 2021-12-22 PROCEDURE — 99024 POSTOP FOLLOW-UP VISIT: CPT | Performed by: NURSE PRACTITIONER

## 2021-12-22 NOTE — PROGRESS NOTES
[facility]  Office Visit      Subjective:  Ms. Angie Florian is a 59 y.o. female s/p mitral valve surgery with Dr. Diego Rebolledo at [facility] [facility]. Patient states she has noted improvement in her shortness of breath. Patient has no issues with swelling in her legs that have been worsening. Patient states mild fatigue at home. Patient is currently taking her blood thinners INR 3.2 no nausea vomiting diarrhea fever chills    Physical Exam  Vitals:  Vitals:    12/22/21 1028   BP: (!) 98/53   Pulse:    Resp:    Temp:    SpO2:        General: Alert and Oriented x3. Ambulatory. No apparent distress. Chest:  No abnormality. Equal and symmetric expansion with respiration. Lungs:  Clear to auscultation. Cardiac:  Regular rate and rhythm without murmurs, rubs or gallops. Abdomen:  Soft, non-tender, normoactive bowel sounds. Extremities:  No edema. Intact pulses in all four extremities. Psychiatric: Mood and affect are appropriate. Sternal incision is clean dry intact no sign of discharge or bleeding  Current Medications:    Current Outpatient Medications:     aspirin 81 MG EC tablet, Take 1 tablet by mouth daily, Disp: 30 tablet, Rfl: 3    metoprolol tartrate (LOPRESSOR) 25 MG tablet, Take 0.5 tablets by mouth 2 times daily, Disp: 60 tablet, Rfl: 3    furosemide (LASIX) 20 MG tablet, Take 1 tablet by mouth daily, Disp: 30 tablet, Rfl: 0    midodrine (PROAMATINE) 5 MG tablet, Take 1 tablet by mouth 3 times daily (with meals), Disp: 90 tablet, Rfl: 3    pantoprazole (PROTONIX) 40 MG tablet, Take 1 tablet by mouth daily, Disp: 30 tablet, Rfl: 0    clonazePAM (KLONOPIN) 1 MG tablet, Take 1 po qhs, Disp: 90 tablet, Rfl: 0    alendronate (FOSAMAX) 70 MG tablet, Take 70 mg by mouth every 7 days Takes on Tuesday. , Disp: , Rfl:     gabapentin (NEURONTIN) 800 MG tablet, TAKE ONE TABLET BY MOUTH EVERY NIGHT AT BEDTIME, Disp: 30 tablet, Rfl: 2    fluticasone (FLONASE) 50 MCG/ACT nasal spray, 1 spray by Each Nostril route daily, Disp: , Rfl:     Warfarin Sodium (COUMADIN PO), Take 2.5 mg by mouth Daily with supper 2.5mg 3x week 1.25mg 4x week, Disp: , Rfl:     Calcium Carb-Cholecalciferol (CALCIUM 600+D) 600-800 MG-UNIT TABS, Take 1 tablet by mouth 2 times daily , Disp: , Rfl:     atorvastatin (LIPITOR) 10 MG tablet, Take 10 mg by mouth nightly , Disp: , Rfl:     Potassium Chloride Natalie CR (KLOR-CON M20 PO), Take 40 mEq by mouth 2 times daily , Disp: , Rfl:     Fluticasone-Salmeterol (ADVAIR HFA IN), Inhale 2 puffs into the lungs 2 times daily , Disp: , Rfl:     baclofen (LIORESAL) 20 MG tablet, Take 20 mg by mouth nightly , Disp: , Rfl:     desvenlafaxine succinate (PRISTIQ) 100 MG TB24, Take 100 mg by mouth daily, Disp: , Rfl:     rOPINIRole (REQUIP) 1 MG tablet, TAKE 1 TABLET BY MOUTH UPON RISING, 3 TABLET BY MOUTH  AT 6 PM AND 3 TABLETS BY  MOUTH AT BEDTIME, Disp: 630 tablet, Rfl: 3    Past Surgical History:   Procedure Laterality Date    ABLATION OF DYSRHYTHMIC FOCUS  2020    Dr. Grecia Tipton  2021    R-L     CARDIOVERSION  2016    failed    CARDIOVERSION  2019     SECTION      COLONOSCOPY      ENDOSCOPY, COLON, DIAGNOSTIC      EYE SURGERY      Cataracts removed, bilaterally    MITRAL VALVE REPAIR N/A 2021    MITRAL VALVE REPLACE, MAZE, CLARIBEL performed by Abi Nunn MD at [facility] TRANSESOPHAGEAL ECHOCARDIOGRAM  10/05/2021    severe mitral regurg    WISDOM TOOTH EXTRACTION      WRIST GANGLION EXCISION Right        Social Hx: reports that she has quit smoking.  She has never used smokeless tobacco.    Assessment & Plan:   Follow-up with cardiothoracic as needed      [facility], APRN - NP,APRN CNP

## 2022-01-07 RX ORDER — GABAPENTIN 800 MG/1
TABLET ORAL
Qty: 30 TABLET | Refills: 4 | Status: SHIPPED | OUTPATIENT
Start: 2022-01-07 | End: 2022-06-13

## 2022-01-07 NOTE — TELEPHONE ENCOUNTER
Pharmacy requesting refill of Gabapentin.       Medication active on med list: yes      Date of last fill: 9/27/21 for #30 and 2 refills  verified on 1/7/2022    verified by AriasMoneyMenttor Deepali, SHARDA      Date of last appointment: 11/18/2021    Next Visit Date: 5/19/2022

## 2022-03-17 ENCOUNTER — INITIAL CONSULT (OUTPATIENT)
Dept: CARDIOTHORACIC SURGERY | Age: 62
End: 2022-03-17
Payer: MEDICARE

## 2022-03-17 VITALS
WEIGHT: 143 LBS | TEMPERATURE: 100.6 F | OXYGEN SATURATION: 100 % | RESPIRATION RATE: 14 BRPM | DIASTOLIC BLOOD PRESSURE: 61 MMHG | SYSTOLIC BLOOD PRESSURE: 123 MMHG | HEART RATE: 60 BPM | BODY MASS INDEX: 24.41 KG/M2 | HEIGHT: 64 IN

## 2022-03-17 DIAGNOSIS — I50.42 CHRONIC COMBINED SYSTOLIC AND DIASTOLIC CONGESTIVE HEART FAILURE (HCC): ICD-10-CM

## 2022-03-17 DIAGNOSIS — R06.02 SHORTNESS OF BREATH: Primary | ICD-10-CM

## 2022-03-17 PROCEDURE — G8427 DOCREV CUR MEDS BY ELIG CLIN: HCPCS | Performed by: NURSE PRACTITIONER

## 2022-03-17 PROCEDURE — 3017F COLORECTAL CA SCREEN DOC REV: CPT | Performed by: NURSE PRACTITIONER

## 2022-03-17 PROCEDURE — 1036F TOBACCO NON-USER: CPT | Performed by: NURSE PRACTITIONER

## 2022-03-17 PROCEDURE — 99214 OFFICE O/P EST MOD 30 MIN: CPT | Performed by: NURSE PRACTITIONER

## 2022-03-17 PROCEDURE — G8420 CALC BMI NORM PARAMETERS: HCPCS | Performed by: NURSE PRACTITIONER

## 2022-03-17 PROCEDURE — G8482 FLU IMMUNIZE ORDER/ADMIN: HCPCS | Performed by: NURSE PRACTITIONER

## 2022-03-17 RX ORDER — LEVOTHYROXINE SODIUM 0.03 MG/1
25 TABLET ORAL DAILY
COMMUNITY

## 2022-03-17 RX ORDER — SPIRONOLACTONE 25 MG/1
TABLET ORAL
COMMUNITY
Start: 2022-03-05

## 2022-03-17 NOTE — PROGRESS NOTES
Pulmonary  [facility] Cardiothoracic Surgery  Consult    Patient's Name/Date of Birth: Samantha Clark / 1960 (59 y.o.)    Date: March 17, 2022     Chief Complaint:   Left sided Pl. Effusion and 1.5cm lung nodule    HPI: Samantha Clark is a 59 y.o. patient presented back to cardiothoracic surgery with a noted left-sided pleural effusion on outlying hospital CT scan. This pleural effusion is mild to moderate. Patient states having some shortness of breath mostly with exercise and activities of daily living. Patient is still recovering from a big open heart surgery mitral valve. Patient has a low ejection fraction of 30%. Patient denies any nausea vomiting fever or chills. Currently patient has no chest pain or shortness of breath. She does have a strong family history of lung cancer recent family member actually passed away of lung cancer.   Patient has an old history of smoking But quit many years ago denies any drug abuse            ROS:   CONSTITUTIONAL:A&0x4  Respiratory: negative  Cardiovascular: negative  Gastrointestinal: negative  Genitourinary:negative  Hematologic/lymphatic: negative  Musculoskeletal:negative  Neurological: negative  Endocrine: negative  Psychiatric: negative  Past Medical History:   Diagnosis Date    Arm pain Right and Left    Atrial fibrillation (HCC)     Atrial fibrillation status post cardioversion (HCC)     Atrial flutter (HCC)     Cancer (HCC)     skin    Carotid artery stenosis     Cerebral artery occlusion with cerebral infarction ([address]     Chest pain 11/2003    CHF (congestive heart failure) ([address]     Chipped tooth 12/01/2021    Top left    COPD (chronic obstructive pulmonary disease) ([address]     Depression     Former tobacco use     H/O: CVA (cerebrovascular accident)     1-    Heart palpitations     History of lateral epicondylitis of right elbow     And left elbow    Hx of blood clots     Hyperlipidemia     Hyperparathyroidism ([address]     Hypertensive chronic kidney disease with stage 1 through stage 4 chronic kidney disease, or unspecified chronic kidney disease     Long term current use of anticoagulant     Mitral regurgitation     Mitral valve insufficiency     Mitral valve regurgitation     Muscle spasm     Nerve pain     Osteoporosis     Personal history of other medical treatment     Chronic diastolic congestive heart failure    Radial tunnel syndrome Right radial tunnel release     Restless legs     SOB (shortness of breath) 2021    Tennis elbow Right and left    Under care of team 2021    Cardiologist: Anton Sood, last visit 2021    Under care of team 2021    PCP: Dr. Tommie Moran [place], last visit 2021    Under care of team 2021    Neurology: Dr. Carlos Rosado, last visit 2021    Under care of team 2021    Urology: Dr. Milly Santos, last visit 10/2021    Wears glasses      Past Surgical History:   Procedure Laterality Date    ABLATION OF DYSRHYTHMIC FOCUS  2020    Dr. Shira Meeks  2021    R-L     CARDIOVERSION  2016    failed    CARDIOVERSION  2019     SECTION      COLONOSCOPY      ENDOSCOPY, COLON, DIAGNOSTIC      EYE SURGERY      Cataracts removed, bilaterally    MITRAL VALVE REPAIR N/A 2021    MITRAL VALVE REPLACE, MAZE, CLARIBEL performed by Beckie Infante MD at [facility] TRANSESOPHAGEAL ECHOCARDIOGRAM  10/05/2021    severe mitral regurg    WISDOM TOOTH EXTRACTION      WRIST GANGLION EXCISION Right      No Known Allergies  Family History   Problem Relation Age of Onset   Vallarie Megan Cancer Mother     No Known Problems Father     Cancer Sister     Peripheral Vascular Disease Sister      Social History     Socioeconomic History    Marital status:      Spouse name: Not on file    Number of children: Not on file    Years of education: Not on file    Highest education level: Not on file   Occupational History    Not on file   Tobacco Use    Smoking status: Former Smoker     Quit date:      Years since quittin.2    Smokeless tobacco: Never Used   Vaping Use    Vaping Use: Never used   Substance and Sexual Activity    Alcohol use: No     Alcohol/week: 0.0 standard drinks    Drug use: No    Sexual activity: Not on file   Other Topics Concern    Not on file   Social History Narrative    Not on file     Social Determinants of Health     Financial Resource Strain:     Difficulty of Paying Living Expenses: Not on file   Food Insecurity:     Worried About Running Out of Food in the Last Year: Not on file    Javier of Food in the Last Year: Not on file   Transportation Needs:     Lack of Transportation (Medical): Not on file    Lack of Transportation (Non-Medical):  Not on file   Physical Activity:     Days of Exercise per Week: Not on file    Minutes of Exercise per Session: Not on file   Stress:     Feeling of Stress : Not on file   Social Connections:     Frequency of Communication with Friends and Family: Not on file    Frequency of Social Gatherings with Friends and Family: Not on file    Attends Mandaen Services: Not on file    Active Member of [address] [place] [address] [employer] or Organizations: Not on file    Attends Club or Organization Meetings: Not on file    Marital Status: Not on file   Intimate Partner Violence:     Fear of Current or Ex-Partner: Not on file    Emotionally Abused: Not on file    Physically Abused: Not on file    Sexually Abused: Not on file   Housing Stability:     Unable to Pay for Housing in the Last Year: Not on file    Number of [place] in the Last Year: Not on file    Unstable Housing in the Last Year: Not on file       Current Outpatient Medications   Medication Sig Dispense Refill    levothyroxine (SYNTHROID) 25 MCG tablet Take 25 mcg by mouth daily      spironolactone (ALDACTONE) 25 MG tablet       gabapentin (NEURONTIN) 800 MG tablet TAKE ONE TABLET BY MOUTH EVERY NIGHT AT BEDTIME 30 tablet 4    aspirin 81 MG EC tablet Take 1 tablet by mouth daily 30 tablet 3    metoprolol tartrate (LOPRESSOR) 25 MG tablet Take 0.5 tablets by mouth 2 times daily 60 tablet 3    furosemide (LASIX) 20 MG tablet Take 1 tablet by mouth daily 30 tablet 0    midodrine (PROAMATINE) 5 MG tablet Take 1 tablet by mouth 3 times daily (with meals) 90 tablet 3    pantoprazole (PROTONIX) 40 MG tablet Take 1 tablet by mouth daily 30 tablet 0    alendronate (FOSAMAX) 70 MG tablet Take 70 mg by mouth every 7 days Takes on Tuesday.  fluticasone (FLONASE) 50 MCG/ACT nasal spray 1 spray by Each Nostril route daily      Warfarin Sodium (COUMADIN PO) Take 2.5 mg by mouth Daily with supper 2.5mg 3x week  1.25mg 4x week      atorvastatin (LIPITOR) 10 MG tablet Take 10 mg by mouth nightly       Potassium Chloride Natalie CR (KLOR-CON M20 PO) Take 40 mEq by mouth 2 times daily       Fluticasone-Salmeterol (ADVAIR HFA IN) Inhale 2 puffs into the lungs 2 times daily       baclofen (LIORESAL) 20 MG tablet Take 20 mg by mouth nightly       desvenlafaxine succinate (PRISTIQ) 100 MG TB24 Take 100 mg by mouth daily      clonazePAM (KLONOPIN) 1 MG tablet Take 1 po qhs 90 tablet 0    rOPINIRole (REQUIP) 1 MG tablet TAKE 1 TABLET BY MOUTH UPON RISING, 3 TABLET BY MOUTH  AT 6 PM AND 3 TABLETS BY  MOUTH AT BEDTIME 630 tablet 3    Calcium Carb-Cholecalciferol (CALCIUM 600+D) 600-800 MG-UNIT TABS Take 1 tablet by mouth 2 times daily        No current facility-administered medications for this visit. Physical Exam:  Vitals:    03/17/22 0857   BP: 123/61   Pulse: 60   Resp: 14   Temp: 100.6 °F (38.1 °C)   SpO2: 100%     Weight: Weight: 143 lb (64.9 kg)    Weight: 143 lb (64.9 kg)        General: Alert and Oriented x3. Sitting up in bed. No apparent distress. HEENT:  Normocephalic and atraumatic. PERRL. EOMI. Lips and oral mucosa moist and without lesions. Neck:  Supple. Trachea midline.   Chest:  No abnormality. Equal and symmetric expansion with respiration. Lungs:  Clear to auscultation. Bilateral crackles noted in bases. Cardiac:  Regular rate and rhythm without murmurs, rubs or gallops. Abdomen:  Soft, non-tender, normoactive bowel sounds. No masses or organomegaly. Extremities:  No cyanosis, clubbing, or edema. Intact pulses in all four extremities. Musculoskeletal:  Intact range of motion of peripheral joints. Normal muscular strength. Neurologic:  Cranial nerves are grossly intact. Non-focal sensory deficits on exam.  Psychiatric: Mood and affect are appropriate. Imaging Studies:        Echo:Global left ventricular systolic function appears moderately reduced. Estimated ejection fraction is 35 % . Calculated EF via Vazquez's method is 38 %. Calculated EF via 3D Heart Model is 33 %. Leftward compression of inter-ventricular septum (\"D-sign\") indicating RV  pressure/volume overload. The right ventricle appears mildly dilated. Aortic valve structure and function normal.  Aortic valve is trileaflet. Trivial aortic insufficiency. No aortic stenosis. A 27 mm Magna Ease bioprosthetic valve is seen in the mitral position. At least mild mitral regurgitation. Normal tricuspid valve leaflets. Moderate to severe tricuspid regurgitation. Mild pulmonary hypertension with an estimated right ventricular systolic  pressure of 46 mmHg. No pericardial effusion. CT: CT chest shows mild to moderate left-sided pleural effusion. Patient also noted to have a new finding of a 1.5 cm lung nodule on the right. No pleural effusion noted on the right. No pneumothorax. Prior Labs reviewed:   No significant lab issues noted. History of hypokalemia. Potassium is currently normal on last blood draw.   Assessment   Patient Active Problem List   Diagnosis    Cerebrovascular accident (CVA) involving left middle cerebral artery [facility])    Right sided weakness    Acute ischemic left internal carotid artery (ICA) stroke [facility]    Arterial ischemic stroke, MCA (middle cerebral artery), left, acute ([address]    Global aphasia    Mobility impaired    Cerebral infarction due to occlusion of left carotid artery (HCC)    Aphasia    Right hemiparesis (HCC)    Atrial fibrillation status post cardioversion (HCC)    Obstructive sleep apnea syndrome    Mitral valve insufficiency    Severe mitral regurgitation    S/P MVR (mitral valve replacement)       Risks Reviewed w/pt  No plans for OR at this time    PLAN:  Increase spironolactone to 25mg twice a day  Lasix 40mg daily  Get BMP drawn   Repeat CT chest in 3 months  See pulmonary team  Obtain PFTs    See us back in 3 months for evaluation with repeat Ct chest     35 min spent   SARAH Sheehan - NP, CNP  Phone: [phone]

## 2022-04-03 DIAGNOSIS — G25.81 RESTLESS LEGS SYNDROME: ICD-10-CM

## 2022-04-04 ENCOUNTER — HOSPITAL ENCOUNTER (OUTPATIENT)
Dept: CT IMAGING | Age: 62
Discharge: HOME | End: 2022-04-06
Payer: MEDICARE

## 2022-04-04 DIAGNOSIS — R06.02 SHORTNESS OF BREATH: ICD-10-CM

## 2022-04-04 DIAGNOSIS — I50.42 CHRONIC COMBINED SYSTOLIC AND DIASTOLIC CONGESTIVE HEART FAILURE (HCC): ICD-10-CM

## 2022-04-04 PROCEDURE — 71250 CT THORAX DX C-: CPT

## 2022-04-04 RX ORDER — CLONAZEPAM 1 MG/1
TABLET ORAL
Qty: 90 TABLET | Refills: 0 | Status: SHIPPED | OUTPATIENT
Start: 2022-04-04 | End: 2022-06-30 | Stop reason: SDUPTHER

## 2022-04-04 RX ORDER — ROPINIROLE 1 MG/1
TABLET, FILM COATED ORAL
Qty: 630 TABLET | Refills: 3 | Status: SHIPPED | OUTPATIENT
Start: 2022-04-04

## 2022-04-04 NOTE — TELEPHONE ENCOUNTER
Pharmacy requesting refill of clonazepam and ropinirole 1 mg. .      Medication active on med list yes      Date of last fill: 11/18/21 90d for clonazepam and   3/15/21 90d with 3 refills for ropinirole verified on 4/4/22  verified by DOROTHY DHILLON      Date of last appointment 11/18/21    Next Visit Date:  4/21/22

## 2022-04-21 ENCOUNTER — OFFICE VISIT (OUTPATIENT)
Dept: NEUROLOGY | Age: 62
End: 2022-04-21
Payer: MEDICARE

## 2022-04-21 VITALS
HEIGHT: 64 IN | HEART RATE: 59 BPM | DIASTOLIC BLOOD PRESSURE: 75 MMHG | WEIGHT: 143 LBS | SYSTOLIC BLOOD PRESSURE: 136 MMHG | BODY MASS INDEX: 24.41 KG/M2

## 2022-04-21 DIAGNOSIS — I48.91 ATRIAL FIBRILLATION, UNSPECIFIED TYPE (HCC): ICD-10-CM

## 2022-04-21 DIAGNOSIS — G25.81 RESTLESS LEGS SYNDROME: Primary | ICD-10-CM

## 2022-04-21 DIAGNOSIS — I63.132 CEREBRAL INFARCTION DUE TO EMBOLISM OF LEFT CAROTID ARTERY (HCC): ICD-10-CM

## 2022-04-21 PROCEDURE — G8427 DOCREV CUR MEDS BY ELIG CLIN: HCPCS | Performed by: PSYCHIATRY & NEUROLOGY

## 2022-04-21 PROCEDURE — 3017F COLORECTAL CA SCREEN DOC REV: CPT | Performed by: PSYCHIATRY & NEUROLOGY

## 2022-04-21 PROCEDURE — 99214 OFFICE O/P EST MOD 30 MIN: CPT | Performed by: PSYCHIATRY & NEUROLOGY

## 2022-04-21 PROCEDURE — G8420 CALC BMI NORM PARAMETERS: HCPCS | Performed by: PSYCHIATRY & NEUROLOGY

## 2022-04-21 PROCEDURE — 1036F TOBACCO NON-USER: CPT | Performed by: PSYCHIATRY & NEUROLOGY

## 2022-04-21 RX ORDER — ROPINIROLE 2 MG/1
TABLET, FILM COATED, EXTENDED RELEASE ORAL
Qty: 150 TABLET | Refills: 3 | Status: SHIPPED
Start: 2022-04-21 | End: 2022-06-30 | Stop reason: CLARIF

## 2022-04-21 ASSESSMENT — ENCOUNTER SYMPTOMS
GASTROINTESTINAL NEGATIVE: 1
ALLERGIC/IMMUNOLOGIC NEGATIVE: 1
EYES NEGATIVE: 1
RESPIRATORY NEGATIVE: 1

## 2022-04-21 NOTE — PROGRESS NOTES
Subjective:      Patient ID: Elisa Us is a 59 y.o. female. HPI  Active problem restless legs syndrome on requip and klonopin and neurontin . Left hemispheric infarction with prior aphasia along with right hemiparesis resolved with TPA and left ICA terminus thrombus retrieval with atrial fibrillation, January 2016. The condition is she reports that over the last 2 months she has been having restless legs breakthrough during evening and night . She had mitral valve replacement along with cardiac pacemaker placement in December having some dyspnea on exertion . She is going to bed at 11 PM falling asleep from a few minutes to 1 hour sleeping to 8 AM . She reports that she is having restless during the day when she sits down usually at about 6PM  . She will take initial requip 1 mg po qAM , 3 mg at 6PM and hs at 11PM . The most disruptive restless legs will be at 9 PM which will persist when she lays in bed  She reports that requip 3 mg dosing at 11 PM may take 2 hours for this to work . She will also take klonopin 1 mg po qhs and neurontin 800 mg po qhs . She will sleep to 7 to 8 AM having 2 awakenings able to fall back asleep . During the day there may be some sleepiness when sedentary which  . There is atrial fibrillation on coumadin . Polysomnogram shows no sleep apnea with apnea hypopnea index 1.1 episodes per hour , August 2021 . She remains on lipitor 10 mg po qd. There is normal language with no weakness ,numbness , bulbar or visual complaint  . There are no more transient head pains . Significant medication coumadin ,  lipitor 10 mg po qd, requip 1 mg in AM , 3 mg po 6PM and 3 po qhs in evening, neurontin 800 mg po qhs, klonopin 1 mg po qhs  Testing Head CT showed dense left MCA artery . CTA with left ICA terminus occlusion . CT perfusion with large left hemispheric penumbra with only small ischemic core left basal ganglia .  Patient did very well post intervention returning to normal exam with full motor strength with normal language . MRI of Head with tiny punctate infarction left caudate along with left frontal periventricular area . Cholesterol 118 , , TG 92, AMY normal, homocysteine normal , Hga1c 5.4 , antithrombin 3 80 (), lupus anticoagulant normal, prothrombin mutation normal , Factor V Leiden, anticardiolipin Ab negative . SAUL EF 50-55% . Moderate to severe MR, moderate TR . Carotid US  normal, September 2019 .  Polysomnogram apnea hypopnea index 1.1 episodes per hour , August 2021      Past Medical History:   Diagnosis Date    Arm pain Right and Left    Atrial fibrillation (HCC)     Atrial fibrillation status post cardioversion [facility])     Atrial flutter ([facility]     Cancer ([facility])     skin    Carotid artery stenosis     Cerebral artery occlusion with cerebral infarction [facility])     Chest pain 11/2003    CH[facility]estive [facility] failure) [facility]     Chipped tooth 12/01/2021    Top left    COPD (chronic obstructive pulmonary disease) ([facility])     Depression     Former tobacco use     H/O: CVA (cerebrovascular accident)     1-    Heart palpitations     History of lateral epicondylitis of right elbow     And left elbow    Hx of blood clots     Hyperlipidemia     Hyperparathyroidism ([facility]     Hypertensive chronic kidney disease with stage 1 through stage 4 chronic kidney disease, or unspecified chronic kidney disease     Long term current use of anticoagulant     Mitral regurgitation     Mitral valve insufficiency     Mitral valve regurgitation     Muscle spasm     Nerve pain     Osteoporosis     Personal history of other medical treatment     Chronic diastolic congestive heart failure    Radial tunnel syndrome Right radial tunnel release     Restless legs     SOB (shortness of breath) 11/08/2021    Tennis elbow Right and left    Under care of team 12/01/2021    Cardiologist: Marie Greenberg, last visit 8/2021    Under care of team 12/01/2021    PCP: Dr. Linus Oreilly, [place], last visit 2021    Under care of team 2021    Neurology: Dr. Padma Navarro, last visit 2021    Under care of team 2021    Urology: Dr. Dania Raphael, last visit 10/2021    Wears glasses        Past Surgical History:   Procedure Laterality Date    ABLATION OF DYSRHYTHMIC FOCUS  2020    Dr. Sofía Rocha  2021    R-L     CARDIOVERSION  2016    failed    CARDIOVERSION  2019     SECTION      COLONOSCOPY      ENDOSCOPY, COLON, DIAGNOSTIC      EYE SURGERY      Cataracts removed, bilaterally    MITRAL VALVE REPAIR N/A 2021    MITRAL VALVE REPLACE, MAZE, CLARIBEL performed by Oksana Garza MD at [facility] TRANSESOPHAGEAL ECHOCARDIOGRAM  10/05/2021    severe mitral regurg    WISDOM TOOTH EXTRACTION      WRIST GANGLION EXCISION Right        Family History   Problem Relation Age of Onset   Bearden Cancer Mother     No Known Problems Father     Cancer Sister     Peripheral Vascular Disease Sister        Social History     Socioeconomic History    Marital status:      Spouse name: None    Number of children: None    Years of education: None    Highest education level: None   Occupational History    None   Tobacco Use    Smoking status: Former Smoker     Quit date:      Years since quittin.3    Smokeless tobacco: Never Used   Vaping Use    Vaping Use: Never used   Substance and Sexual Activity    Alcohol use: No     Alcohol/week: 0.0 standard drinks    Drug use: No    Sexual activity: None   Other Topics Concern    None   Social History Narrative    None     Social Determinants of Health     Financial Resource Strain:     Difficulty of Paying Living Expenses: Not on file   Food Insecurity:     Worried About Running Out of Food in the Last Year: Not on file    Javier of Food in the Last Year: Not on file   Transportation Needs:     Lack of Transportation (Medical):  Not on file    Lack of Transportation (Non-Medical):  Not on file   Physical Activity:     Days of Exercise per Week: Not on file    Minutes of Exercise per Session: Not on file   Stress:     Feeling of Stress : Not on file   Social Connections:     Frequency of Communication with Friends and Family: Not on file    Frequency of Social Gatherings with Friends and Family: Not on file    Attends Restoration Services: Not on file    Active Member of [address] [place] [address] or Organizations: Not on file    Attends Club or Organization Meetings: Not on file    Marital Status: Not on file   Intimate Partner Violence:     Fear of Current or Ex-Partner: Not on file    Emotionally Abused: Not on file    Physically Abused: Not on file    Sexually Abused: Not on file   Housing Stability:     Unable to Pay for Housing in the Last Year: Not on file    Number of [place] in the Last Year: Not on file    Unstable Housing in the Last Year: Not on file       Current Outpatient Medications   Medication Sig Dispense Refill    rOPINIRole (REQUIP XL) 2 MG extended release tablet Take 1po q 12 noon , 2 tablets q 6 PM and 2 tablets at bedtime 150 tablet 3    clonazePAM (KLONOPIN) 1 MG tablet TAKE 1 TABLET BY MOUTH  EVERY NIGHT AT BEDTIME 90 tablet 0    rOPINIRole (REQUIP) 1 MG tablet TAKE 1 TABLET BY MOUTH UPON RISING 3 TABLETS BY MOUTH  AT 6 PM AND 3 TABLETS BY  MOUTH AT BEDTIME 630 tablet 3    levothyroxine (SYNTHROID) 25 MCG tablet Take 25 mcg by mouth daily      spironolactone (ALDACTONE) 25 MG tablet       gabapentin (NEURONTIN) 800 MG tablet TAKE ONE TABLET BY MOUTH EVERY NIGHT AT BEDTIME 30 tablet 4    aspirin 81 MG EC tablet Take 1 tablet by mouth daily 30 tablet 3    metoprolol tartrate (LOPRESSOR) 25 MG tablet Take 0.5 tablets by mouth 2 times daily 60 tablet 3    furosemide (LASIX) 20 MG tablet Take 1 tablet by mouth daily 30 tablet 0    midodrine (PROAMATINE) 5 MG tablet Take 1 tablet by mouth 3 times daily (with meals) 90 tablet 3  pantoprazole (PROTONIX) 40 MG tablet Take 1 tablet by mouth daily 30 tablet 0    alendronate (FOSAMAX) 70 MG tablet Take 70 mg by mouth every 7 days Takes on Tuesday.  fluticasone (FLONASE) 50 MCG/ACT nasal spray 1 spray by Each Nostril route daily      Warfarin Sodium (COUMADIN PO) Take 2.5 mg by mouth Daily with supper 2.5mg 3x week  1.25mg 4x week      atorvastatin (LIPITOR) 10 MG tablet Take 10 mg by mouth nightly       Potassium Chloride Natalie CR (KLOR-CON M20 PO) Take 40 mEq by mouth 2 times daily       Fluticasone-Salmeterol (ADVAIR HFA IN) Inhale 2 puffs into the lungs 2 times daily       baclofen (LIORESAL) 20 MG tablet Take 20 mg by mouth nightly       desvenlafaxine succinate (PRISTIQ) 100 MG TB24 Take 100 mg by mouth daily      Calcium Carb-Cholecalciferol (CALCIUM 600+D) 600-800 MG-UNIT TABS Take 1 tablet by mouth 2 times daily        No current facility-administered medications for this visit. No Known Allergies      Review of Systems   Constitutional: Negative. HENT: Negative. Eyes: Negative. Respiratory: Negative. Cardiovascular: Positive for leg swelling. Gastrointestinal: Negative. Endocrine: Negative. Genitourinary: Negative. Musculoskeletal: Negative. Allergic/Immunologic: Negative. Hematological: Negative. Psychiatric/Behavioral: The patient is nervous/anxious. Objective:   Physical Exam  Vitals:    04/21/22 1257   BP: 136/75   Pulse: 59     weight: 143 lb (64.9 kg)    Neurological Examination  Ears /Nose/Throat: external ear . Normal exam  Neck and thyroid . Normal size  Respiratory . Breathsounds clear bilaterally  Cardiovascular:  Auscultation of heart with regular rate and rhythm   Musculoskeletal. Muscle bulk and tone normal   Muscle strength 5/5 strength throughout   No dysmetria or dysdiadokinesis  No tremor   Normal fine motor  Orientation Alert and oriented x 3   Language process and speech normal . No aphasia   Cranial nerve 2 normal acuety and visual fields  Cranial nerve 3, 4 and 6 . Extraocular muscles are intact . Pupils are equal and reactive   Cranial nerve 5 . Intact corneal reflex. Normal facial sensation  Cranial nerve 7 normal exam   Cranial nerve 8. Grossly intact hearing   Cranial nerve 9 and 10. Symmetric palate elevation   Cranial nerve 11 , 5 out of 5 strength   Cranial Nerve 12 midline tongue . No atrophy  Sensation . Normal pinprick and light touch   Deep Tendon Reflexes normal  Plantar response flexor bilaterally    Assessment:      1. Restless legs syndrome    2. Cerebral infarction due to embolism of left carotid artery ([address]    3.  Atrial fibrillation, unspecified type ([address]    Will change requip dosing to requip 2 mg XL po q noon 4 mg XL q 6PM and 4mg po XL qhs keeping on klonopin 1 mg po qhs and neurontin 800 mg po qhs     Plan:      As above

## 2022-04-28 ENCOUNTER — HOSPITAL ENCOUNTER (OUTPATIENT)
Dept: PULMONOLOGY | Age: 62
Discharge: HOME | End: 2022-04-28
Payer: MEDICARE

## 2022-04-28 DIAGNOSIS — R06.02 SHORTNESS OF BREATH: ICD-10-CM

## 2022-04-28 DIAGNOSIS — I50.42 CHRONIC COMBINED SYSTOLIC AND DIASTOLIC CONGESTIVE HEART FAILURE (HCC): ICD-10-CM

## 2022-04-28 PROCEDURE — 94726 PLETHYSMOGRAPHY LUNG VOLUMES: CPT

## 2022-04-28 PROCEDURE — 6370000000 HC RX 637 (ALT 250 FOR IP): Performed by: INTERNAL MEDICINE

## 2022-04-28 PROCEDURE — 94664 DEMO&/EVAL PT USE INHALER: CPT

## 2022-04-28 PROCEDURE — 94060 EVALUATION OF WHEEZING: CPT

## 2022-04-28 PROCEDURE — 94729 DIFFUSING CAPACITY: CPT

## 2022-04-28 RX ORDER — ALBUTEROL SULFATE 90 UG/1
4 AEROSOL, METERED RESPIRATORY (INHALATION) ONCE
Status: COMPLETED | OUTPATIENT
Start: 2022-04-28 | End: 2022-04-28

## 2022-04-28 RX ADMIN — ALBUTEROL SULFATE 4 PUFF: 90 AEROSOL, METERED RESPIRATORY (INHALATION) at 07:34

## 2022-05-03 ENCOUNTER — TELEPHONE (OUTPATIENT)
Dept: NEUROLOGY | Age: 62
End: 2022-05-03

## 2022-05-05 NOTE — PROCEDURES
[address], [place] [address]                               PULMONARY FUNCTION    PATIENT NAME: Madhuri Hyman                     :        1960  MED REC NO:   [id number]                              ROOM:  ACCOUNT NO:   [de-identified]                           ADMIT DATE: 2022  PROVIDER:     Georges Yeung    DATE OF PROCEDURE:  2022    FINDINGS:  The patient's spirometry shows a small airway obstructive  defect. FEV1 is 74% predicted with 5% bronchodilator change. FVC is  82% predicted with 1% bronchodilator change to 84% predicted. FEV1/FVC  ratio is 69, postbronchodilator is 72. HNH02-10 is 48% predicted with 23% bronchodilator change to 60%  predicted. Lung volume by body box are normal.  Total lung capacity is 100%  predicted and RV is 113% predicted. Uncorrected diffusion capacity is 54% predicted. Corrected for alveolar  volume is 63% predicted with normal airway resistance. FINAL IMPRESSION:  This study shows an obstructive ventilatory  dysfunction of mild severity which responds to bronchodilation, although  bronchodilator response does not meet ATS criteria. Clinical trial is  recommended. Diffusion capacity is moderately decreased, which could be  due to underlying emphysema or interstitial lung disease or anemia or  pulmonary vascular disease. Clinical correlation is advised. ANDREY LACKEY    D: 2022 15:24:32       T: 2022 0:44:29     /[email or web address]  Job#: [id number]     Doc#: [id number]    CC: SARAH Fu-CARMEN

## 2022-06-13 RX ORDER — GABAPENTIN 800 MG/1
TABLET ORAL
Qty: 30 TABLET | Refills: 1 | Status: SHIPPED | OUTPATIENT
Start: 2022-06-13 | End: 2022-06-30 | Stop reason: SDUPTHER

## 2022-06-13 NOTE — TELEPHONE ENCOUNTER
**Please fill as Dr. Valente Mcnulty is on vacation **        Pharmacy requesting refill of:  gabapentin (neurontin) 800 mg tablet      Medication active on med list:  yes      Date of last Rx: 1/7/2022  with 4  refills   verified on 06/13/2022   verified by TIFFANIE ARITA      Date of last appointment: 04/21/2022    Next Visit Date:  6/30/2022

## 2022-06-23 ENCOUNTER — OFFICE VISIT (OUTPATIENT)
Dept: CARDIOTHORACIC SURGERY | Age: 62
End: 2022-06-23
Payer: MEDICARE

## 2022-06-23 VITALS
WEIGHT: 149 LBS | RESPIRATION RATE: 17 BRPM | DIASTOLIC BLOOD PRESSURE: 62 MMHG | BODY MASS INDEX: 25.44 KG/M2 | TEMPERATURE: 98.9 F | OXYGEN SATURATION: 100 % | SYSTOLIC BLOOD PRESSURE: 110 MMHG | HEART RATE: 60 BPM | HEIGHT: 64 IN

## 2022-06-23 DIAGNOSIS — J84.10 CALCIFIED GRANULOMA OF LUNG (HCC): Primary | ICD-10-CM

## 2022-06-23 PROCEDURE — 3017F COLORECTAL CA SCREEN DOC REV: CPT | Performed by: NURSE PRACTITIONER

## 2022-06-23 PROCEDURE — 1036F TOBACCO NON-USER: CPT | Performed by: NURSE PRACTITIONER

## 2022-06-23 PROCEDURE — G8417 CALC BMI ABV UP PARAM F/U: HCPCS | Performed by: NURSE PRACTITIONER

## 2022-06-23 PROCEDURE — 99213 OFFICE O/P EST LOW 20 MIN: CPT | Performed by: NURSE PRACTITIONER

## 2022-06-23 PROCEDURE — G8427 DOCREV CUR MEDS BY ELIG CLIN: HCPCS | Performed by: NURSE PRACTITIONER

## 2022-06-23 RX ORDER — ATORVASTATIN CALCIUM 10 MG/1
TABLET, FILM COATED ORAL
COMMUNITY

## 2022-06-23 RX ORDER — WARFARIN SODIUM 5 MG/1
TABLET ORAL
COMMUNITY

## 2022-06-23 RX ORDER — FUROSEMIDE 80 MG
20 TABLET ORAL DAILY
COMMUNITY

## 2022-06-23 RX ORDER — DESVENLAFAXINE 100 MG/1
TABLET, EXTENDED RELEASE ORAL
COMMUNITY

## 2022-06-23 RX ORDER — ROPINIROLE 1 MG/1
TABLET, FILM COATED ORAL
COMMUNITY

## 2022-06-23 RX ORDER — POTASSIUM CHLORIDE 20 MEQ/1
TABLET, EXTENDED RELEASE ORAL
COMMUNITY

## 2022-06-23 NOTE — PROGRESS NOTES
[facility]  Office Visit      Subjective:  Ms. Carmen Titus is a 57 y.o. female follow-up regarding status post complications after open heart surgery of left-sided effusions. Patient states she has no shortness of breath but still has some mild fatigue due to management under CHF. Patient denies any chest pain shortness of breath. No nausea vomiting diarrhea fever chills. One of the concerns of why she is back today in the office is an evaluation of the granuloma that is 5 mm on the right upper lobe    Physical Exam  Vitals:  Vitals:    06/23/22 1014   BP: 110/62   Pulse:    Resp:    Temp:    SpO2:        General: Alert and Oriented x3. Ambulatory. No apparent distress. Chest:  No abnormality. Equal and symmetric expansion with respiration. Lungs:  Clear to auscultation. Cardiac:  Regular rate and rhythm without murmurs, rubs or gallops. Abdomen:  Soft, non-tender, normoactive bowel sounds. Extremities:  No edema. Intact pulses in all four extremities. Psychiatric: Mood and affect are appropriate. 2 CT scans from 6 months apart show no change in size of the right upper lobe granuloma. This small nodule is not spiculated cancer.     Current Medications:    Current Outpatient Medications:     fluticasone-salmeterol (ADVAIR HFA) 230-21 MCG/ACT inhaler, 2 puffs, Disp: , Rfl:     atorvastatin (LIPITOR) 10 MG tablet, 1 tablet, Disp: , Rfl:     Baclofen 20 MG/20ML SOLN, 1 tablet with food or milk, Disp: , Rfl:     furosemide (LASIX) 80 MG tablet, 0.5 tablet, Disp: , Rfl:     Gabapentin, Once-Daily, 300 & 600 MG MISC, 0.5 tablet, Disp: , Rfl:     potassium chloride (KLOR-CON M20) 20 MEQ extended release tablet, 2 tablet, Disp: , Rfl:     desvenlafaxine succinate (PRISTIQ) 100 MG TB24 extended release tablet, 1 tablet, Disp: , Rfl:     rOPINIRole (REQUIP) 1 MG tablet, -1 in AM 2 in PM, Disp: , Rfl:     Spironolactone 25 MG/5ML SUSP, 1 tablet with food, Disp: , Rfl:     warfarin (COUMADIN) 5 MG tablet, 1 tablet, Disp: , Rfl:     gabapentin (NEURONTIN) 800 MG tablet, TAKE ONE TABLET BY MOUTH EVERY NIGHT AT BEDTIME, Disp: 30 tablet, Rfl: 1    clonazePAM (KLONOPIN) 1 MG tablet, TAKE 1 TABLET BY MOUTH  EVERY NIGHT AT BEDTIME, Disp: 90 tablet, Rfl: 0    rOPINIRole (REQUIP) 1 MG tablet, TAKE 1 TABLET BY MOUTH UPON RISING 3 TABLETS BY MOUTH  AT 6 PM AND 3 TABLETS BY  MOUTH AT BEDTIME, Disp: 630 tablet, Rfl: 3    levothyroxine (SYNTHROID) 25 MCG tablet, Take 25 mcg by mouth daily, Disp: , Rfl:     aspirin 81 MG EC tablet, Take 1 tablet by mouth daily, Disp: 30 tablet, Rfl: 3    metoprolol tartrate (LOPRESSOR) 25 MG tablet, Take 0.5 tablets by mouth 2 times daily, Disp: 60 tablet, Rfl: 3    furosemide (LASIX) 20 MG tablet, Take 1 tablet by mouth daily, Disp: 30 tablet, Rfl: 0    midodrine (PROAMATINE) 5 MG tablet, Take 1 tablet by mouth 3 times daily (with meals), Disp: 90 tablet, Rfl: 3    pantoprazole (PROTONIX) 40 MG tablet, Take 1 tablet by mouth daily, Disp: 30 tablet, Rfl: 0    alendronate (FOSAMAX) 70 MG tablet, Take 70 mg by mouth every 7 days Takes on Tuesday. , Disp: , Rfl:     fluticasone (FLONASE) 50 MCG/ACT nasal spray, 1 spray by Each Nostril route daily, Disp: , Rfl:     Warfarin Sodium (COUMADIN PO), Take 2.5 mg by mouth Daily with supper 2.5mg 3x week 1.25mg 4x week, Disp: , Rfl:     atorvastatin (LIPITOR) 10 MG tablet, Take 10 mg by mouth nightly , Disp: , Rfl:     Potassium Chloride Natalie CR (KLOR-CON M20 PO), Take 40 mEq by mouth 2 times daily , Disp: , Rfl:     Fluticasone-Salmeterol (ADVAIR HFA IN), Inhale 2 puffs into the lungs 2 times daily , Disp: , Rfl:     baclofen (LIORESAL) 20 MG tablet, Take 20 mg by mouth nightly , Disp: , Rfl:     desvenlafaxine succinate (PRISTIQ) 100 MG TB24, Take 100 mg by mouth daily, Disp: , Rfl:     rOPINIRole (REQUIP XL) 2 MG extended release tablet, Take 1po q 12 noon , 2 tablets q 6 PM and 2 tablets at bedtime, Disp: 150 tablet, Rfl: 3    spironolactone (ALDACTONE) 25 MG tablet, , Disp: , Rfl:     Calcium Carb-Cholecalciferol (CALCIUM 600+D) 600-800 MG-UNIT TABS, Take 1 tablet by mouth 2 times daily , Disp: , Rfl:     Past Surgical History:   Procedure Laterality Date    ABLATION OF DYSRHYTHMIC FOCUS  2020    Dr. Jevon Hess  2021    R-L     CARDIOVERSION  2016    failed    CARDIOVERSION  2019     SECTION      COLONOSCOPY      ENDOSCOPY, COLON, DIAGNOSTIC      EYE SURGERY      Cataracts removed, bilaterally    MITRAL VALVE REPAIR N/A 2021    MITRAL VALVE REPLACE, MAZE, CLARIBEL performed by Sondra Munoz MD at [facility] TRANSESOPHAGEAL ECHOCARDIOGRAM  10/05/2021    severe mitral regurg    WISDOM TOOTH EXTRACTION      WRIST GANGLION EXCISION Right        Social Hx: reports that she quit smoking about 35 years ago.  She has never used smokeless tobacco.    Assessment & Plan:   eval granuloma in 1 year with repeat CT chest  See pulmonary team in August  Review PFTs normal.   Please continue to follow-up with [facility] and primary care regarding your heart failure management        [address], APRN - NP,APRN CNP

## 2022-06-30 ENCOUNTER — OFFICE VISIT (OUTPATIENT)
Dept: NEUROLOGY | Age: 62
End: 2022-06-30
Payer: MEDICARE

## 2022-06-30 VITALS
BODY MASS INDEX: 25.27 KG/M2 | DIASTOLIC BLOOD PRESSURE: 64 MMHG | WEIGHT: 148 LBS | HEIGHT: 64 IN | SYSTOLIC BLOOD PRESSURE: 111 MMHG | HEART RATE: 60 BPM

## 2022-06-30 DIAGNOSIS — I48.91 ATRIAL FIBRILLATION, UNSPECIFIED TYPE (HCC): ICD-10-CM

## 2022-06-30 DIAGNOSIS — I63.132 CEREBRAL INFARCTION DUE TO EMBOLISM OF LEFT CAROTID ARTERY (HCC): ICD-10-CM

## 2022-06-30 DIAGNOSIS — G25.81 RESTLESS LEGS SYNDROME: Primary | ICD-10-CM

## 2022-06-30 DIAGNOSIS — G81.91 RIGHT HEMIPARESIS (HCC): ICD-10-CM

## 2022-06-30 DIAGNOSIS — G47.33 OBSTRUCTIVE SLEEP APNEA SYNDROME: ICD-10-CM

## 2022-06-30 PROCEDURE — G8417 CALC BMI ABV UP PARAM F/U: HCPCS | Performed by: PSYCHIATRY & NEUROLOGY

## 2022-06-30 PROCEDURE — G8427 DOCREV CUR MEDS BY ELIG CLIN: HCPCS | Performed by: PSYCHIATRY & NEUROLOGY

## 2022-06-30 PROCEDURE — 3017F COLORECTAL CA SCREEN DOC REV: CPT | Performed by: PSYCHIATRY & NEUROLOGY

## 2022-06-30 PROCEDURE — 1036F TOBACCO NON-USER: CPT | Performed by: PSYCHIATRY & NEUROLOGY

## 2022-06-30 PROCEDURE — 99214 OFFICE O/P EST MOD 30 MIN: CPT | Performed by: PSYCHIATRY & NEUROLOGY

## 2022-06-30 RX ORDER — GABAPENTIN 800 MG/1
TABLET ORAL
Qty: 90 TABLET | Refills: 1 | Status: SHIPPED | OUTPATIENT
Start: 2022-06-30 | End: 2022-12-30

## 2022-06-30 RX ORDER — CLONAZEPAM 1 MG/1
TABLET ORAL
Qty: 90 TABLET | Refills: 0 | Status: SHIPPED | OUTPATIENT
Start: 2022-06-30 | End: 2022-09-29

## 2022-06-30 ASSESSMENT — ENCOUNTER SYMPTOMS
ALLERGIC/IMMUNOLOGIC NEGATIVE: 1
GASTROINTESTINAL NEGATIVE: 1
RESPIRATORY NEGATIVE: 1
EYES NEGATIVE: 1

## 2022-06-30 NOTE — PROGRESS NOTES
Subjective:      Patient ID: Lino De Paz is a 57 y.o. female. HPI  Active problem restless legs syndrome readjsuting requip on klonopin and neurontin . Left hemispheric infarction with prior aphasia along with right hemiparesis resolved with TPA and left ICA terminus thrombus retrieval with atrial fibrillation, January 2016. The condition is she reports that her insurance would not approve extended release requip remaining on regular requip . She is on requip 1 mg po q AM  3 mg q  4 to 5PM and 3mg po qhs, neurontin 800 mg po qhs, klonopin 1 mg po qhsover . Restless legs are not as bad having some breakthrough starting in morning when sitting becoming more of an issue about 6 PM . There has been development of excessive daytime sleepiness worse over the past 6 month . She is going to bed from 11 PM to 1 AM falling asleep within 20 minutes sleeping to 8 AM . There will be one awakening able to go to back asleep . Prior sleep study showed no sleep apnea with an apnea hypopnea index 1.1 episodes per hour . She had mitral valve replacement along with cardiac pacemaker placement in December having some dyspnea on exertion . There is atrial fibrillation on coumadin . She remains on lipitor 10 mg po qd. There is normal language with no weakness ,numbness , bulbar or visual complaint  . There are no more transient head pains . Significant medication coumadin ,  lipitor 10 mg po qd, requip 1 mg po q AM  3 mg q  4 to 5PM and 3mg po qhs, neurontin 800 mg po qhs, klonopin 1 mg po qhs . Testing Head CT showed dense left MCA artery . CTA with left ICA terminus occlusion . CT perfusion with large left hemispheric penumbra with only small ischemic core left basal ganglia . Patient did very well post intervention returning to normal exam with full motor strength with normal language . MRI of Head with tiny punctate infarction left caudate along with left frontal periventricular area .  Cholesterol 118 , , TG 92, AMY normal, homocysteine normal , Hga1c 5.4 , antithrombin 3 80 (), lupus anticoagulant normal, prothrombin mutation normal , Factor V Leiden, anticardiolipin Ab negative . SAUL EF 50-55% . Moderate to severe MR, moderate TR . Carotid US  normal, September 2019 .  Polysomnogram apnea hypopnea index 1.1 episodes per hour , August 2021      Past Medical History:   Diagnosis Date    Arm pain Right and Left    Atrial fibrillation (HCC)     Atrial fibrillation status post cardioversion [facility])     Atrial flutter (HCC)     Cancer ([facility])     skin    Carotid artery stenosis     Cerebral artery occlusion with cerebral infarction [facility])     Chest pain 11/2003    [facility]     Chipped tooth 12/01/2021    Top left    COPD (chronic obstructive pulmonary disease) ([facility])     Depression     Former tobacco use     H/O: CVA (cerebrovascular accident)     1-    Heart palpitations     History of lateral epicondylitis of right elbow     And left elbow    Hx of blood clots     Hyperlipidemia     Hyperparathyroidism ([facility]     Hypertensive chronic kidney disease with stage 1 through stage 4 chronic kidney disease, or unspecified chronic kidney disease     Long term current use of anticoagulant     Mitral regurgitation     Mitral valve insufficiency     Mitral valve regurgitation     Muscle spasm     Nerve pain     Osteoporosis     Personal history of other medical treatment     Chronic diastolic congestive heart failure    Radial tunnel syndrome Right radial tunnel release     Restless legs     SOB (shortness of breath) 11/08/2021    Tennis elbow Right and left    Under care of team 12/01/2021    Cardiologist: Lencho Michelle, last visit 8/2021    Under care of team 12/01/2021    PCP: Dr. Agustina Ruby [place], last visit 6/2021    Under care of team 12/01/2021    Neurology: Dr. Raul Norman, last visit 11/2021    Under care of team 12/01/2021    Urology: Dr. Aime Waller, last visit 10/2021    Wears glasses        Past Surgical History:   Procedure Laterality Date    ABLATION OF DYSRHYTHMIC FOCUS  2020    Dr. Caro Boas  2021    R-L     CARDIOVERSION  2016    failed    CARDIOVERSION  2019     SECTION      COLONOSCOPY      ENDOSCOPY, COLON, DIAGNOSTIC      EYE SURGERY      Cataracts removed, bilaterally    MITRAL VALVE REPAIR N/A 2021    MITRAL VALVE REPLACE, MAZE, CLARIBEL performed by John Gautam MD at [facility] TRANSESOPHAGEAL ECHOCARDIOGRAM  10/05/2021    severe mitral regurg    WISDOM TOOTH EXTRACTION      WRIST GANGLION EXCISION Right        Family History   Problem Relation Age of Onset   Jan Oconnor Cancer Mother     No Known Problems Father     Cancer Sister     Peripheral Vascular Disease Sister        Social History     Socioeconomic History    Marital status:      Spouse name: None    Number of children: None    Years of education: None    Highest education level: None   Occupational History    None   Tobacco Use    Smoking status: Former Smoker     Quit date:      Years since quittin.5    Smokeless tobacco: Never Used   Vaping Use    Vaping Use: Never used   Substance and Sexual Activity    Alcohol use: No     Alcohol/week: 0.0 standard drinks    Drug use: No    Sexual activity: None   Other Topics Concern    None   Social History Narrative    None     Social Determinants of Health     Financial Resource Strain:     Difficulty of Paying Living Expenses: Not on file   Food Insecurity:     Worried About Running Out of Food in the Last Year: Not on file    Javier of Food in the Last Year: Not on file   Transportation Needs:     Lack of Transportation (Medical): Not on file    Lack of Transportation (Non-Medical):  Not on file   Physical Activity:     Days of Exercise per Week: Not on file    Minutes of Exercise per Session: Not on file   Stress:     Feeling of Stress : Not on file   Social Connections:     Frequency of Communication with Friends and Family: Not on file    Frequency of Social Gatherings with Friends and Family: Not on file    Attends Islam Services: Not on file    Active Member of [address] [place] [address] or Organizations: Not on file    Attends Club or Organization Meetings: Not on file    Marital Status: Not on file   Intimate Partner Violence:     Fear of Current or Ex-Partner: Not on file    Emotionally Abused: Not on file    Physically Abused: Not on file    Sexually Abused: Not on file   Housing Stability:     Unable to Pay for Housing in the Last Year: Not on file    Number of Katherin in the Last Year: Not on file    Unstable Housing in the Last Year: Not on file       Current Outpatient Medications   Medication Sig Dispense Refill    clonazePAM (KLONOPIN) 1 MG tablet TAKE 1 TABLET BY MOUTH  EVERY NIGHT AT BEDTIME 90 tablet 0    gabapentin (NEURONTIN) 800 MG tablet Take 1 po qhs 90 tablet 1    fluticasone-salmeterol (ADVAIR HFA) 230-21 MCG/ACT inhaler 2 puffs      atorvastatin (LIPITOR) 10 MG tablet 1 tablet      furosemide (LASIX) 80 MG tablet Take 20 mg by mouth daily       Gabapentin, Once-Daily, 300 & 600 MG MISC 0.5 tablet      potassium chloride (KLOR-CON M20) 20 MEQ extended release tablet 2 tablet      desvenlafaxine succinate (PRISTIQ) 100 MG TB24 extended release tablet 1 tablet      rOPINIRole (REQUIP) 1 MG tablet -1 in AM 2 in PM      warfarin (COUMADIN) 5 MG tablet 1 tablet      rOPINIRole (REQUIP) 1 MG tablet TAKE 1 TABLET BY MOUTH UPON RISING 3 TABLETS BY MOUTH  AT 6 PM AND 3 TABLETS BY  MOUTH AT BEDTIME 630 tablet 3    levothyroxine (SYNTHROID) 25 MCG tablet Take 25 mcg by mouth daily      spironolactone (ALDACTONE) 25 MG tablet       aspirin 81 MG EC tablet Take 1 tablet by mouth daily 30 tablet 3    metoprolol tartrate (LOPRESSOR) 25 MG tablet Take 0.5 tablets by mouth 2 times daily 60 tablet 3    furosemide (LASIX) 20 MG tablet Take 1 tablet by mouth daily 30 tablet 0    midodrine (PROAMATINE) 5 MG tablet Take 1 tablet by mouth 3 times daily (with meals) 90 tablet 3    pantoprazole (PROTONIX) 40 MG tablet Take 1 tablet by mouth daily 30 tablet 0    alendronate (FOSAMAX) 70 MG tablet Take 70 mg by mouth every 7 days Takes on Tuesday.  fluticasone (FLONASE) 50 MCG/ACT nasal spray 1 spray by Each Nostril route daily      Warfarin Sodium (COUMADIN PO) Take 2.5 mg by mouth Daily with supper 2.5mg 3x week  1.25mg 4x week      Calcium Carb-Cholecalciferol (CALCIUM 600+D) 600-800 MG-UNIT TABS Take 1 tablet by mouth 2 times daily       atorvastatin (LIPITOR) 10 MG tablet Take 10 mg by mouth nightly       Potassium Chloride Natalie CR (KLOR-CON M20 PO) Take 40 mEq by mouth 2 times daily       Fluticasone-Salmeterol (ADVAIR HFA IN) Inhale 2 puffs into the lungs 2 times daily       baclofen (LIORESAL) 20 MG tablet Take 20 mg by mouth nightly       desvenlafaxine succinate (PRISTIQ) 100 MG TB24 Take 100 mg by mouth daily      Baclofen 20 MG/20ML SOLN 1 tablet with food or milk (Patient not taking: Reported on 6/30/2022)      Spironolactone 25 MG/5ML SUSP 1 tablet with food (Patient not taking: Reported on 6/30/2022)       No current facility-administered medications for this visit. No Known Allergies      Review of Systems   Constitutional: Negative. HENT: Negative. Eyes: Negative. Respiratory: Negative. Cardiovascular: Positive for leg swelling. Gastrointestinal: Negative. Endocrine: Negative. Genitourinary: Negative. Musculoskeletal: Negative. Allergic/Immunologic: Negative. Hematological: Negative. Psychiatric/Behavioral: The patient is nervous/anxious. Objective:   Physical Exam  Vitals:    06/30/22 1447   BP: 111/64   Pulse: 60     weight: 148 lb (67.1 kg)    Neurological Examination  Ears /Nose/Throat: external ear . Normal exam  Neck and thyroid . Normal size  Respiratory . Breathsounds clear bilaterally  Cardiovascular: Auscultation of heart with regular rate and rhythm   Musculoskeletal. Muscle bulk and tone normal   Muscle strength 5/5 strength throughout   No dysmetria or dysdiadokinesis  No tremor   Normal fine motor  Orientation Alert and oriented x 3   Language process and speech normal . No aphasia   Cranial nerve 2 normal acuety and visual fields  Cranial nerve 3, 4 and 6 . Extraocular muscles are intact . Pupils are equal and reactive   Cranial nerve 5 . Intact corneal reflex. Normal facial sensation  Cranial nerve 7 normal exam   Cranial nerve 8. Grossly intact hearing   Cranial nerve 9 and 10. Symmetric palate elevation   Cranial nerve 11 , 5 out of 5 strength   Cranial Nerve 12 midline tongue . No atrophy  Sensation . Normal pinprick and light touch   Deep Tendon Reflexes normal  Plantar response flexor bilaterally    Assessment:      1. Restless legs syndrome    2. Right hemiparesis ([address]    3. Cerebral infarction due to embolism of left carotid artery ([address]    4. Atrial fibrillation, unspecified type ([address]    5.  Obstructive sleep apnea syndrome    Will change requip dosing to requip 1 mg po q noon 3 mg q 6PM and 3mg po qhs keeping on klonopin 1 mg po qhs and neurontin 800 mg po qhs     Plan:      As above

## 2022-08-05 ENCOUNTER — OFFICE VISIT (OUTPATIENT)
Dept: PULMONOLOGY | Age: 62
End: 2022-08-05
Payer: MEDICARE

## 2022-08-05 VITALS
SYSTOLIC BLOOD PRESSURE: 102 MMHG | OXYGEN SATURATION: 100 % | WEIGHT: 154 LBS | RESPIRATION RATE: 18 BRPM | DIASTOLIC BLOOD PRESSURE: 68 MMHG | HEART RATE: 63 BPM | HEIGHT: 64 IN | BODY MASS INDEX: 26.29 KG/M2

## 2022-08-05 DIAGNOSIS — G47.33 OBSTRUCTIVE SLEEP APNEA SYNDROME: Primary | ICD-10-CM

## 2022-08-05 PROCEDURE — 3017F COLORECTAL CA SCREEN DOC REV: CPT | Performed by: INTERNAL MEDICINE

## 2022-08-05 PROCEDURE — G8427 DOCREV CUR MEDS BY ELIG CLIN: HCPCS | Performed by: INTERNAL MEDICINE

## 2022-08-05 PROCEDURE — G8417 CALC BMI ABV UP PARAM F/U: HCPCS | Performed by: INTERNAL MEDICINE

## 2022-08-05 PROCEDURE — 1036F TOBACCO NON-USER: CPT | Performed by: INTERNAL MEDICINE

## 2022-08-05 PROCEDURE — 99204 OFFICE O/P NEW MOD 45 MIN: CPT | Performed by: INTERNAL MEDICINE

## 2022-08-05 RX ORDER — ALBUTEROL SULFATE 90 UG/1
2 AEROSOL, METERED RESPIRATORY (INHALATION) 4 TIMES DAILY PRN
Qty: 54 G | Refills: 1 | Status: SHIPPED | OUTPATIENT
Start: 2022-08-05

## 2022-08-05 NOTE — PATIENT INSTRUCTIONS
@[email or web address]@        YOU ARE BEING REFERRED TO:    NOEMY GLASER (a department of [facility])    [address] [place] [address] [address], [place]    Main Phone Number: [phone]    Phone number for scheduling sleep study: [phone]      Please contact the above numbers to schedule your sleep study. Once you have completed the FIRST NIGHT you may be asked to return for a SECOND NIGHT if necessary. After the SECOND NIGHT the sleep center will order a CPAP/BiPAP machine for you. An order for the machine will be sent to a durable medical equipment company ([employer]). The sleep center will provide you with the [employer] companies information. Once you have your machine please contact our office to schedule a follow up appointment. Your follow up will be scheduled for a date 30-90 days after you have received your machine. At that follow up visit we will need to have a compliance download from your DME company. Please contact your [employer] company to have the compliance download faxed to our office at   [phone] for your follow up appointment. IF YOU HAVE ANY QUESTIONS ABOUT YOUR SLEEP STUDY   PLEASE CONTACT THE SLEEP CENTER.

## 2022-08-05 NOTE — PROGRESS NOTES
REASON FOR THE CONSULTATION:  Shortness of breath  Mitral valve replacement surgery SP  Recurrent heart failure  Hypersomnia  Possible sleep apnea  History of CVA  Atrial fibrillation  HISTORY OF PRESENT ILLNESS:    Bety Marcus is a 57y.o. year old female here for evaluation of effort dyspnea that she been experiencing she is Kary Ayala for the last year or 2. She have had mitral valve replacement surgery in December last year and since then her shortness of breath on effort has got worse. Although patient did have some degree of shortness of breath prior to the surgery. He also have had a pacemaker inserted at that time. Patient is known to have atrial fibrillation and is on anticoagulation atrial fibrillation is rate controlled she is not known to have any hypertension no diabetes however she did have a cerebrovascular accident due to embolic phenomena in January this year. She was treated with endovascular procedure and fortunately she did not develop any residual weakness. The radiological studies of the brain has revealed only minimal damage. Patient is essentially a non-smoker she quit smoking more than 30 years back for smoking only for a short period she was on Lasix but it has been stopped. She also had a pleural effusion which was bilateral left more than right and that has gotten better. She has gained about 6 pounds lately. She has persistent bilateral edema left more than right. No history of any thromboembolic process. Patient also has been noted to have a lung nodule in the right lung in the upper lobe. No other nodules are noted. No biopsies have been done. I reviewed CT scan of the chest and the x-ray of the nodule seem to be calcified and very likely is a granuloma. There is seem to be some component of pulmonary congestion and small pleural effusions. She is not known to have any gout. She does not snore.   She has no symptoms of cataplexy sleep paralysis or any hallucination. However but according to the  she is very sleepy. She falls asleep while eating she will fall asleep vertically while washing the dishes. Clinically she has not been involved in any accidents. She does not take any medication in the morning which could be making her so sleepy. The sleepiness has been going on for some time but most likely got worse after the surgery. She is known to have hypothyroid state and is on replacement therapy apparently thyroid function has been stable    Patient claims that he she have had sleep study few years back which did not reveal any significant abnormality. She is not known to have any periodic leg movements or restless leg syndrome. She does sleep 7 to 9 hours every night. There are no parasomnias.   He have had a COVID-vaccine  LUNG CANCER SCREENING     CRITERIA MET    []     CT ORDERED  []      CRITERIA NOT MET   [x]      REFUSED                    []        REASON CRITERIA NOT MET     SMOKING LESS THAN 30 PY  []      AGE LESS THAN 55 or GREATER 77 YEARS  []      QUIT SMOKING 15 YEARS OR GREATER   []      RECENT CT WITH IN 11 MONTHS    []      LIFE EXPECTANCY < 5 YEARS   []      SIGNS  AND SYMPTOMS OF LUNG CANCER   []         Immunization   Immunization History   Administered Date(s) Administered    COVID-19, MODERNA BLUE border, Primary or Immunocompromised, (age 12y+), IM, 100 mcg/0.5mL 03/08/2021, 04/05/2021    Influenza, Quadv, IM, PF (6 mo and older Fluzone, Flulaval, Fluarix, and 3 yrs and older Afluria) 12/14/2021        Pneumococcal Vaccine     [] Up to date    [] Indicated   [] Refused  [] Contraindicated       Influenza Vaccine   [x] Up to date    [] Indicated   [] Refused  [] Contraindicated          PAST MEDICAL HISTORY:       Diagnosis Date    Arm pain Right and Left    Atrial fibrillation (HCC)     Atrial fibrillation status post cardioversion [facility])     Atrial flutter ([facility] [address]     Cancer ([facility] [address]     skin    Carotid artery stenosis Cerebral artery occlusion with cerebral infarction [facility])     Chest pain 2003    CHF (congestive heart failure) ([facility] [address]     Chipped tooth 2021    Top left    COPD (chronic obstructive pulmonary disease) ([facility]     Depression     Former tobacco use     H/O: CVA (cerebrovascular accident)     2016    Heart palpitations     History of lateral epicondylitis of right elbow     And left elbow    Hx of blood clots     Hyperlipidemia     Hyperparathyroidism ([facility] [address]     Hypertensive chronic kidney disease with stage 1 through stage 4 chronic kidney disease, or unspecified chronic kidney disease     Long term current use of anticoagulant     Mitral regurgitation     Mitral valve insufficiency     Mitral valve regurgitation     Muscle spasm     Nerve pain     Osteoporosis     Personal history of other medical treatment     Chronic diastolic congestive heart failure    Radial tunnel syndrome Right radial tunnel release     Restless legs     SOB (shortness of breath) 2021    Tennis elbow Right and left    Under care of team 2021    Cardiologist: Jennie Ford, last visit 2021    Under care of team 2021    PCP: Dr. Dewey Schwab Dunbar, last visit 2021    Under care of team 2021    Neurology: Dr. Manual Opitz, last visit 2021    Under care of team 2021    Urology: Dr. Sandra Peralta, last visit 10/2021    Wears glasses          Family History:       Problem Relation Age of Onset    Cancer Mother     No Known Problems Father     Cancer Sister     Peripheral Vascular Disease Sister        SURGICAL HISTORY:   Past Surgical History:   Procedure Laterality Date    ABLATION OF DYSRHYTHMIC FOCUS  2020    Dr. Les Jorge  2021    R-L     CARDIOVERSION  2016    failed    CARDIOVERSION  2019     SECTION      COLONOSCOPY      ENDOSCOPY, COLON, DIAGNOSTIC      EYE SURGERY      Cataracts removed, bilaterally    MITRAL VALVE REPAIR N/A 2021 MITRAL VALVE REPLACE, MAZE, CLARIBEL performed by Abi Nunn MD at [facility]      TRANSESOPHAGEAL ECHOCARDIOGRAM  10/05/2021    severe mitral regurg    WISDOM TOOTH EXTRACTION      WRIST GANGLION EXCISION Right            SOCIAL AND OCCUPATIONAL HEALTH:      There is no history of TB or TB exposure. There is no asbestos or silica dust exposure. The patient reports is no coal, foundry, quarry or [employer] exposure. Travel history reveals no. There is no history of recreational or IV drug use. There is no hot tube exposure. Pets none no significant occupational history    Occupational history not significant    TOBACCO:   reports that she quit smoking about 35 years ago. Her smoking use included cigarettes. She has never used smokeless tobacco.  ETOH:   reports no history of alcohol use. ALLERGIES:      No Known Allergies      Home Meds:   Prior to Admission medications    Medication Sig Start Date End Date Taking?  Authorizing Provider   albuterol sulfate HFA (VENTOLIN HFA) 108 (90 Base) MCG/ACT inhaler Inhale 2 puffs into the lungs 4 times daily as needed for Wheezing 8/5/22  Yes Lord Mirella MD   clonazePAM (KLONOPIN) 1 MG tablet TAKE 1 TABLET BY MOUTH  EVERY NIGHT AT BEDTIME 6/30/22 9/29/22 Yes Cuba Choi MD   gabapentin (NEURONTIN) 800 MG tablet Take 1 po qhs 6/30/22 12/30/22 Yes Cuba Choi MD   fluticasone-salmeterol (ADVAIR HFA) 230-21 MCG/ACT inhaler 2 puffs   Yes Historical Provider, MD   Baclofen 20 MG/20ML SOLN 1 tablet with food or milk   Yes Historical Provider, MD   furosemide (LASIX) 80 MG tablet Take 20 mg by mouth daily    Yes Historical Provider, MD   Gabapentin, Once-Daily, 300 & 600 MG MISC 0.5 tablet   Yes Historical Provider, MD   potassium chloride (KLOR-CON M) 20 MEQ extended release tablet 2 tablet   Yes Historical Provider, MD   desvenlafaxine succinate (PRISTIQ) 100 MG TB24 extended release tablet 1 tablet   Yes Historical Provider, MD   rOPINIRole (REQUIP) 1 MG tablet -1 in AM 2 in PM   Yes Historical Provider, MD   Spironolactone 25 MG/5ML SUSP 1 tablet with food   Yes Historical Provider, MD   warfarin (COUMADIN) 5 MG tablet 1 tablet   Yes Historical Provider, MD   rOPINIRole (REQUIP) 1 MG tablet TAKE 1 TABLET BY MOUTH UPON RISING 3 TABLETS BY MOUTH  AT 6 PM AND 3 TABLETS BY  MOUTH AT BEDTIME 4/4/22  Yes Kody Nunes MD   levothyroxine (SYNTHROID) 25 MCG tablet Take 25 mcg by mouth daily   Yes Historical Provider, MD   spironolactone (ALDACTONE) 25 MG tablet  3/5/22  Yes Historical Provider, MD   aspirin 81 MG EC tablet Take 1 tablet by mouth daily 12/14/21  Yes SARAH Delarosa CNP   metoprolol tartrate (LOPRESSOR) 25 MG tablet Take 0.5 tablets by mouth 2 times daily 12/14/21  Yes SARAH Delarosa CNP   furosemide (LASIX) 20 MG tablet Take 1 tablet by mouth daily 12/14/21  Yes SARAH Delarosa CNP   pantoprazole (PROTONIX) 40 MG tablet Take 1 tablet by mouth daily 12/14/21  Yes SARAH Delarosa CNP   alendronate (FOSAMAX) 70 MG tablet Take 70 mg by mouth every 7 days Takes on Tuesday.    Yes Historical Provider, MD   fluticasone (FLONASE) 50 MCG/ACT nasal spray 1 spray by Each Nostril route daily   Yes Historical Provider, MD   Warfarin Sodium (COUMADIN PO) Take 2.5 mg by mouth Daily with supper 2.5mg 3x week  1.25mg 4x week   Yes Historical Provider, MD   Calcium Carb-Cholecalciferol 600-800 MG-UNIT TABS Take 1 tablet by mouth 2 times daily    Yes Historical Provider, MD   Potassium Chloride Natalie CR (KLOR-CON M20 PO) Take 40 mEq by mouth 2 times daily    Yes Historical Provider, MD   Fluticasone-Salmeterol (ADVAIR HFA IN) Inhale 2 puffs into the lungs 2 times daily    Yes Historical Provider, MD   baclofen (LIORESAL) 20 MG tablet Take 20 mg by mouth nightly    Yes Historical Provider, MD   desvenlafaxine succinate (PRISTIQ) 100 MG TB24 Take 100 mg by mouth daily   Yes sounds active all four quadrants,     no masses, no organomegaly         2+ and symmetric all extremities     Skin color, texture, turgor normal, no rashes or lesions       Cervical, supraclavicular not enlarged or matted or tender      CNII-XII intact, normal strength 5/5 . Sensation grossly normal  and reflexes normal 2+  throughout     Clubbing No  Lower ext edema bilateral pedal edema left more than right. Pitting in nature  Upper ext edema no edema         Musculoskeletal no synovitis. No joint swelling or tenderness        CBC: No results for input(s): WBC, HGB, PLT in the last 72 hours. BMP:  No results for input(s): NA, K, CL, CO2, BUN, CREATININE, GLUCOSE in the last 72 hours. Hepatic: No results for input(s): AST, ALT, ALB, BILITOT, ALKPHOS in the last 72 hours. Amylase: No results found for: AMYLASE  Lipase: No results found for: LIPASE  Troponin: No results for input(s): TROPONINI in the last 72 hours. BNP: No results for input(s): BNP in the last 72 hours. Lipids: No results for input(s): CHOL, HDL in the last 72 hours. Invalid input(s): LDLCALCU  ABGs: No results found for: PHART, PO2ART, [id number]  INR: No results for input(s): INR in the last 72 hours. Thyroid: No results found for: T4, TSH  Urinalysis: No results for input(s): BACTERIA, BLOODU, CLARITYU, COLORU, PHUR, PROTEINU, RBCUA, SPECGRAV, BILIRUBINUR, NITRU, WBCUA, LEUKOCYTESUR, GLUCOSEU in the last 72 hours.       Cultures:-  No blood cultures    CXR  Show cardiomegaly possible pulmonary congestion      CT Scans  As noted above patient does have a nodule about 7 mm in size in the right upper lobe region which is is calcified and very likely is a granuloma    Echo    No recent echo reports are available            IMPRESSION:      Effort dyspnea  : Recurrent heart failure            History of mitral valve replacement surgery  Pacemaker placement  History of CVA treated with endovascular surgery and procedure  Atrial fibrillation on anticoagulation  Insomnia  PLAN:      Patient has effort dyspnea which is very likely related to cardiac dysfunction. She is a non-smoker all her life there is no history to suggest chronic obstructive lung disease or asthma. Will be interesting to do a repeat echocardiogram to evaluate the LV dysfunction. Patient has atrial fibrillation which further is very likely compromising the LV function. He will continue the anticoagulation as before. Atrial fibrillation is rate controlled. She is not known to have any coronary artery disease. I advised her to start taking Lasix 40 mg daily. I also advised her to monitor her weight yearly recently gained 6 pounds. She has a lung nodule which is very likely calcified and granuloma. He does not require any further work-up  For she has severe degree of hypersomnia. Etiology of hypersomnia is unclear. She have had a sleep study done many years back but that did not reveal any apneas. I arrange for her to have a repeat sleep study. We will also include an MSLT to assess the severity of daytime sleepiness. Depending on the results of the sleep study we will bladder plan further management. I advised her to use albuterol on as-needed basis. She is taking inhaled steroid which does not look like they are helping her. I advised her that she may stop the inhaled steroid along with a long-acting beta agonist.    Which will continue treatment of atrial fibrillation with rate control and also continue the anticoagulation. I will see her follow-up after the sleep study done    We will get a pulmonary function study next visit    Dictated with Dr. Marlon Castillo          Requested Prescriptions     Signed Prescriptions Disp Refills    albuterol sulfate HFA (VENTOLIN HFA) 108 (90 Base) MCG/ACT inhaler 54 g 1     Sig: Inhale 2 puffs into the lungs 4 times daily as needed for Wheezing       There are no discontinued medications.     Yumiko received counseling on the following healthy behaviors: nutrition, exercise and medication adherence    Patient given educational materials : see patient instruction       Discussed use, benefit, and side effects of prescribed medications. Barriers to medication compliance addressed. All patient questions answered. Pt voiced understanding. I hope this updates you on my evaluation and clinical thinking. Thank you for allowing me to participate in his care. Sincerely,      Electronically signed by Dave Ho MD on   8/5/22 at 1:12 PM EDT       Please note that this chart was generated using voice recognition Dragon dictation software. Although every effort was made to ensure the accuracy of this automated transcription, some errors in transcription may have occurred.

## 2022-08-09 ENCOUNTER — TELEPHONE (OUTPATIENT)
Dept: PULMONOLOGY | Age: 62
End: 2022-08-09

## 2022-08-09 NOTE — TELEPHONE ENCOUNTER
Spoke with pt and she informed me that she had a PFT done on 4/28/22 at [facility] in [facility]. Records are in the pts chart. Do you want her to have a repeat PFT at her next visit in Dec? Please advise.

## 2022-08-09 NOTE — TELEPHONE ENCOUNTER
Writer called pt and left a VM to schedule a PFT. She is currently scheduled for a f/u with Dr. Temo Andre on 12/9/22 at 11:15am. Her appt with a PFT first would need to either be moved to an earlier time or a different day.

## 2022-08-24 ENCOUNTER — HOSPITAL ENCOUNTER (OUTPATIENT)
Dept: SLEEP CENTER | Age: 62
Discharge: HOME | End: 2022-08-26
Payer: MEDICARE

## 2022-08-24 DIAGNOSIS — G47.33 OBSTRUCTIVE SLEEP APNEA SYNDROME: ICD-10-CM

## 2022-08-24 PROCEDURE — 95810 POLYSOM 6/> YRS 4/> PARAM: CPT

## 2022-08-25 ENCOUNTER — HOSPITAL ENCOUNTER (OUTPATIENT)
Dept: SLEEP CENTER | Age: 62
Discharge: HOME | End: 2022-08-27
Payer: MEDICARE

## 2022-08-25 PROCEDURE — 95805 MULTIPLE SLEEP LATENCY TEST: CPT

## 2022-08-31 LAB
STATUS: NORMAL
STATUS: NORMAL

## 2022-09-08 NOTE — TELEPHONE ENCOUNTER
Per Dr. Temo Andre- he would like the pt to get another PFT. VM was left for the pt informing her. Order is in the pts chart.

## 2022-09-12 ENCOUNTER — TELEPHONE (OUTPATIENT)
Dept: PULMONOLOGY | Age: 62
End: 2022-09-12

## 2022-10-11 NOTE — PATIENT INSTRUCTIONS
Increase spironolactone to 25 twice a day  Lasix 40mg daily  Get lab drawn or when symptoms occur  Repeat CT chest in 3 months  See pulmonary team  Obtain PFTs    See us back in 3 months for evaluation with repeat Ct chest Lm informing patient of Josselin response.  Advised her to call office back if she has any questions or concerns.

## 2022-11-16 DIAGNOSIS — G25.81 RESTLESS LEGS SYNDROME: ICD-10-CM

## 2022-11-17 RX ORDER — CLONAZEPAM 1 MG/1
TABLET ORAL
Qty: 90 TABLET | Refills: 0 | Status: SHIPPED | OUTPATIENT
Start: 2022-11-17 | End: 2023-03-17

## 2022-11-17 NOTE — TELEPHONE ENCOUNTER
Pharmacy requesting refill of Klonopin 1mg.       Medication active on med list yes      Date of last fill: 06/30/2022    verified on 11/17/2022     verified by [facility]      Date of last appointment 06/30/2022    Next Visit Date:  11/30/2022

## 2022-11-18 ENCOUNTER — OFFICE VISIT (OUTPATIENT)
Dept: PULMONOLOGY | Age: 62
End: 2022-11-18
Payer: MEDICARE

## 2022-11-18 ENCOUNTER — TELEPHONE (OUTPATIENT)
Dept: PULMONOLOGY | Age: 62
End: 2022-11-18

## 2022-11-18 VITALS
DIASTOLIC BLOOD PRESSURE: 57 MMHG | HEIGHT: 64 IN | WEIGHT: 172.6 LBS | HEART RATE: 68 BPM | OXYGEN SATURATION: 95 % | SYSTOLIC BLOOD PRESSURE: 93 MMHG | BODY MASS INDEX: 29.47 KG/M2

## 2022-11-18 DIAGNOSIS — I50.22 CHRONIC SYSTOLIC (CONGESTIVE) HEART FAILURE (HCC): ICD-10-CM

## 2022-11-18 DIAGNOSIS — G47.10 HYPERSOMNIA: Primary | ICD-10-CM

## 2022-11-18 PROCEDURE — 1036F TOBACCO NON-USER: CPT | Performed by: INTERNAL MEDICINE

## 2022-11-18 PROCEDURE — G8484 FLU IMMUNIZE NO ADMIN: HCPCS | Performed by: INTERNAL MEDICINE

## 2022-11-18 PROCEDURE — 99214 OFFICE O/P EST MOD 30 MIN: CPT | Performed by: INTERNAL MEDICINE

## 2022-11-18 PROCEDURE — G8417 CALC BMI ABV UP PARAM F/U: HCPCS | Performed by: INTERNAL MEDICINE

## 2022-11-18 PROCEDURE — 3017F COLORECTAL CA SCREEN DOC REV: CPT | Performed by: INTERNAL MEDICINE

## 2022-11-18 PROCEDURE — G8427 DOCREV CUR MEDS BY ELIG CLIN: HCPCS | Performed by: INTERNAL MEDICINE

## 2022-11-18 RX ORDER — MODAFINIL 100 MG/1
100 TABLET ORAL 2 TIMES DAILY
Qty: 60 TABLET | Refills: 0 | Status: SHIPPED | OUTPATIENT
Start: 2022-11-18 | End: 2022-12-18

## 2022-11-18 NOTE — PROGRESS NOTES
REASON FOR THE CONSULTATION:  Shortness of breath  Mitral valve replacement surgery SP  Recurrent heart failure  Hypersomnia  Possible sleep apnea  History of CVA  Atrial fibrillation  HISTORY OF PRESENT ILLNESS:    Augustin Gabriel has been evaluated in the office for excessive daytime sleepiness. I was concerned that she may have either sleep apnea or narcolepsy. Sleep study was performed which failed to reveal any evidence of sleep apnea of any significance. 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 We also did a multiple sleep latency testing to assess the extent extent of daytime sleepiness. She was very sleepy during the daytime latency dropped to sleep onset during the naps was 4 minutes. However she did not have any REM onset naps. She does have a history of hypothyroid state and is on thyroid replacement therapy. 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 Is taking Klonopin which she takes at at bedtime. Restarted by neurology    Treatment of PLM's. He have a history of heart failure, CVA and also known to have atrial fibrillation which is rate controlled. She is on anticoagulation.   She have had a mitral valve replacement surgery done in the past.              LUNG CANCER SCREENING     CRITERIA MET    []     CT ORDERED  []      CRITERIA NOT MET   [x]      REFUSED                    []        REASON CRITERIA NOT MET     SMOKING LESS THAN 30 PY  []      AGE LESS THAN 55 or GREATER 77 YEARS  []      QUIT SMOKING 15 YEARS OR GREATER   []      RECENT CT WITH IN 11 MONTHS    []      LIFE EXPECTANCY < 5 YEARS   []      SIGNS  AND SYMPTOMS OF LUNG CANCER   []         Immunization   Immunization History   Administered Date(s) Administered    COVID-19, MODERNA BLUE border, Primary or Immunocompromised, (age 12y+), IM, 100 mcg/0.5mL 03/08/2021, 04/05/2021    Influenza, FLUARIX, FLULAVAL, FLUZONE (age 10 mo+) AND AFLURIA, (age 1 y+), PF, 0.5mL 12/14/2021        Pneumococcal Vaccine     [] Up to date    [] Indicated   [] Refused  [] Contraindicated       Influenza Vaccine   [x] Up to date    [] Indicated   [] Refused  [] Contraindicated          PAST MEDICAL HISTORY:       Diagnosis Date    Arm pain Right and Left    Atrial fibrillation ([facility]     Atrial fibrillation status post cardioversion [facility])     Atrial flutter ([facility]     Cancer ([facility]     skin    Carotid artery stenosis     Cerebral artery occlusion with cerebral infarction [facility])     Chest pain 11/2003    CHF (congestive heart failure) ([facility]     Chipped tooth 12/01/2021    Top left    COPD (chronic obstructive pulmonary disease) ([facility]     Depression     Former tobacco use     H/O: CVA (cerebrovascular accident)     1-    Heart palpitations     History of lateral epicondylitis of right elbow     And left elbow    Hx of blood clots     Hyperlipidemia     Hyperparathyroidism ([facility]     Hypertensive chronic kidney disease with stage 1 through stage 4 chronic kidney disease, or unspecified chronic kidney disease     Long term current use of anticoagulant     Mitral regurgitation     Mitral valve insufficiency     Mitral valve regurgitation     Muscle spasm     Nerve pain     Osteoporosis     Personal history of other medical treatment     Chronic diastolic congestive heart failure    Radial tunnel syndrome Right radial tunnel release     Restless legs     SOB (shortness of breath) 11/08/2021    Tennis elbow Right and left    Under care of team 12/01/2021    Cardiologist: Annmarie Perera, last visit 8/2021    Under care of team 12/01/2021    PCP: Dr. Katharine Duffy [place], last visit 6/2021    Under care of team 12/01/2021    Neurology: Dr. Halle Tang, last visit 11/2021    Under care of team 12/01/2021    Urology: Dr. Brissa Balderas, last visit 10/2021    Wears glasses          Family History:       Problem Relation Age of Onset    Cancer Mother     No Known Problems Father     Cancer Sister Peripheral Vascular Disease Sister        SURGICAL HISTORY:   Past Surgical History:   Procedure Laterality Date    ABLATION OF DYSRHYTHMIC FOCUS  2020    Dr. Jewels Buchanan  2021    R-L     CARDIOVERSION  2016    failed    CARDIOVERSION  2019     SECTION      COLONOSCOPY      ENDOSCOPY, COLON, DIAGNOSTIC      EYE SURGERY      Cataracts removed, bilaterally    MITRAL VALVE REPAIR N/A 2021    MITRAL VALVE REPLACE, MAZE, CLARIBEL performed by Jj Friend MD at [address]      TRANSESOPHAGEAL ECHOCARDIOGRAM  10/05/2021    severe mitral regurg    WISDOM TOOTH EXTRACTION      WRIST GANGLION EXCISION Right            SOCIAL AND OCCUPATIONAL HEALTH:      There is no history of TB or TB exposure. There is no asbestos or silica dust exposure. The patient reports is no coal, foundry, quarry or [address] exposure. Travel history reveals no. There is no history of recreational or IV drug use. There is no hot tube exposure. Pets none no significant occupational history    Occupational history not significant    TOBACCO:   reports that she quit smoking about 35 years ago. Her smoking use included cigarettes. She has never used smokeless tobacco.  ETOH:   reports no history of alcohol use. ALLERGIES:      No Known Allergies      Home Meds:   Prior to Admission medications    Medication Sig Start Date End Date Taking? Authorizing Provider   modafinil (PROVIGIL) 100 MG tablet Take 1 tablet by mouth 2 times daily for 30 days.  22 Yes Zeus Sesay MD   clonazePAM (KLONOPIN) 1 MG tablet TAKE 1 TABLET BY MOUTH  EVERY NIGHT AT BEDTIME 11/17/22 3/17/23 Yes Gildardo Sandoval MD   albuterol sulfate HFA (VENTOLIN HFA) 108 (90 Base) MCG/ACT inhaler Inhale 2 puffs into the lungs 4 times daily as needed for Wheezing 22  Yes Zeus Sesay MD   gabapentin (NEURONTIN) 800 MG tablet Take 1 po qhs 22 Yes Marie BURGER tablet Take 1 tablet by mouth 3 times daily (with meals)  Patient not taking: Reported on 8/5/2022 12/14/21   SARAH Delarosa - CNP   fluticasone (FLONASE) 50 MCG/ACT nasal spray 1 spray by Each Nostril route daily    Historical Provider, MD   Warfarin Sodium (COUMADIN PO) Take 2.5 mg by mouth Daily with supper 2.5mg 3x week  1.25mg 4x week    Historical Provider, MD   Calcium Carb-Cholecalciferol 600-800 MG-UNIT TABS Take 1 tablet by mouth 2 times daily     Historical Provider, MD   atorvastatin (LIPITOR) 10 MG tablet Take 10 mg by mouth nightly   Patient not taking: Reported on 8/5/2022    Historical Provider, MD   Potassium Chloride Natalie CR (KLOR-CON M20 PO) Take 40 mEq by mouth 2 times daily     Historical Provider, MD   Fluticasone-Salmeterol (ADVAIR HFA IN) Inhale 2 puffs into the lungs 2 times daily     Historical Provider, MD   desvenlafaxine succinate (PRISTIQ) 100 MG TB24 Take 100 mg by mouth daily    Historical Provider, MD              REVIEW OF SYSTEMS: Review the system was conducted for all other 10 system no additional information was obtained other than what is noted above.     CONSTITUTIONAL:  negative for  fevers, chills, sweats, fatigue, malaise, anorexia and weight loss  EYES:  negative for  double vision, blurred vision, dry eyes, eye discharge and redness  HEENT:  negative for  hearing loss, tinnitus, ear drainage, earaches and nasal congestion  RESPIRATORY:  See hpi  CARDIOVASCULAR:  negative for  chest pain,, palpitations, orthopnea, PND  GASTROINTESTINAL:  negative for nausea, vomiting, change in bowel habits, diarrhea, constipation, abdominal pain, pruritus, abdominal mass and abdominal distention  GENITOURINARY:  negative for frequency, dysuria, nocturia, urinary incontinence and hesitancy  INTEGUMENT  negative for rash, skin lesion(s), dryness, skin color change, changes in lesion, pruritus and changes in hair  HEMATOLOGIC/LYMPHATIC:  negative for easy bruising, bleeding, lymphadenopathy, petechiae and swelling/edema  ALLERGIC/IMMUNOLOGIC:  negative for recurrent infections, urticaria and drug reactions  ENDOCRINE:  negative for heat intolerance, cold intolerance, tremor, weight changes and change in bowel habits  MUSCULOSKELETAL:  negative for  myalgias, arthralgias, pain, joint swelling, stiff joints and decreased range of motion  NEUROLOGICAL:  negative for headaches, dizziness, seizures, memory problems, speech problems, visual disturbance and coordination problems  BEHAVIOR/PSYCH:  negative for poor appetite, increased appetite, decreased sleep, increased sleep, decreased energy level, increased energy level and poor concentration  Skin no rash no dermatitis  Vitals:  BP (!) 93/57 (Site: Left Upper Arm, Position: Sitting, Cuff Size: Large Adult)   Pulse 68   Ht 5' 4\" (1.626 m)   Wt 172 lb 9.6 oz (78.3 kg)   SpO2 95%   BMI 29.63 kg/m²     PHYSICAL EXAM:  General Appearance:    Alert, cooperative, no distress, appears stated age] no respiratory distress not using accessory muscles   Head:    Normocephalic, without obvious abnormality, atraumatic      Eyes:    PERRL, conjunctiva/corneas clear, EOM's intact no jaundice no Jose Angel syndrome   Ears:    Normal  external ear canals, both ears   Nose:   Nares normal, septum midline, mucosa normal, no drainage        or sinus tenderness no nasal polyps   Throat:   Lips, mucosa, and tongue normal; teeth and gums normal normal throat examination oropharyngeal orifice not compromised   Neck:   Supple, symmetrical, trachea midline, no adenopathy;     thyroid:  no enlargement/tenderness/nodules; no carotid    bruit , JVD not elevated hepatojugular reflux negative neck is not short of fat   Back:     Symmetric, no curvature, ROM normal, no CVA tenderness   Lungs:   AP diameter is not increased percussion note is normally resonant except slight decreased at the left base breathing vesicular no rales rhonchi are audible expiration not prolonged no pleural friction rub is audible   Chest Wall:    No tenderness or deformity      Heart:    Regular rate and rhythm, S1 and S2 normal, no murmur, rub        or gallop no rvh grade 3 wave 6 systolic murmur is heard in the left parasternal area is also present S4                          Abdomen:                                                 Pulses:                              Skin:                  Lymph nodes:                    Neurologic:                  Soft, non-tender, bowel sounds active all four quadrants,     no masses, no organomegaly         2+ and symmetric all extremities     Skin color, texture, turgor normal, no rashes or lesions       Cervical, supraclavicular not enlarged or matted or tender      CNII-XII intact, normal strength 5/5 . Sensation grossly normal  and reflexes normal 2+  throughout     Clubbing No  Lower ext edema bilateral pedal edema left more than right. Pitting in nature  Upper ext edema no edema         Musculoskeletal no synovitis. No joint swelling or tenderness        CBC: No results for input(s): WBC, HGB, PLT in the last 72 hours. BMP:  No results for input(s): NA, K, CL, CO2, BUN, CREATININE, GLUCOSE in the last 72 hours. Hepatic: No results for input(s): AST, ALT, ALB, BILITOT, ALKPHOS in the last 72 hours. Amylase: No results found for: AMYLASE  Lipase: No results found for: LIPASE  Troponin: No results for input(s): TROPONINI in the last 72 hours. BNP: No results for input(s): BNP in the last 72 hours. Lipids: No results for input(s): CHOL, HDL in the last 72 hours. Invalid input(s): LDLCALCU  ABGs: No results found for: PHART, PO2ART, UJR0OOK  INR: No results for input(s): INR in the last 72 hours. Thyroid: No results found for: T4, TSH  Urinalysis: No results for input(s): BACTERIA, BLOODU, CLARITYU, COLORU, PHUR, PROTEINU, RBCUA, SPECGRAV, BILIRUBINUR, NITRU, WBCUA, LEUKOCYTESUR, GLUCOSEU in the last 72 hours.       Cultures:-  No blood cultures    CXR  Show cardiomegaly possible pulmonary congestion      CT Scans  As noted above patient does have a nodule about 7 mm in size in the right upper lobe region which is is calcified and very likely is a granuloma    Echo    No recent echo reports are available            IMPRESSION:    Visit Diagnoses         Codes    Hypersomnia    -  Primary G47.10          Effort dyspnea  : Recurrent heart failure            History of mitral valve replacement surgery  Pacemaker placement  History of CVA treated with endovascular surgery and procedure  Atrial fibrillation on anticoagulation  Insomnia  PLAN:      I discussed the results of the sleep study and MSLT with the patient and her . She is very sleepy during the daytime although there is no clinical evidence or polysomnographic evidence of for narcolepsy. She does not have any cataplexy sleep paralysis or hallucinations. She is very sleepy during the daytime based on the latency to sleep during various naps. I did start her on Provigil 100 mg twice a day. If that does not help we may increase the dose to 200 mg twice a day. I discussed with them the nature of her prior problem and answered her question about hypersomnia and narcolepsy. Advised her not to take the second dose of Provigil too late in the day but otherwise it may interfere with nighttime sleep onset. She will continue the treatment for atrial fibrillation and other cardiac problems. Dictated by Dr. Ayala Barry        Requested Prescriptions     Signed Prescriptions Disp Refills    modafinil (PROVIGIL) 100 MG tablet 60 tablet 0     Sig: Take 1 tablet by mouth 2 times daily for 30 days. There are no discontinued medications. Yumiko received counseling on the following healthy behaviors: nutrition, exercise and medication adherence    Patient given educational materials : see patient instruction       Discussed use, benefit, and side effects of prescribed medications. Barriers to medication compliance addressed. All patient questions answered. Pt voiced understanding. I hope this updates you on my evaluation and clinical thinking. Thank you for allowing me to participate in his care. Sincerely,      Electronically signed by Armen Martinez MD on   8/5/22 at 1:12 PM EDT       Please note that this chart was generated using voice recognition Dragon dictation software. Although every effort was made to ensure the accuracy of this automated transcription, some errors in transcription may have occurred.

## 2022-11-21 NOTE — TELEPHONE ENCOUNTER
Outcome  Approvedon November 18  Request Reference Number: [id number]. MODAFINIL TAB 100MG is approved through 05/18/2023. Your patient may now fill this prescription and it will be covered. Drug  Modafinil 100MG tablets  Form  [employer] Medicare Part D Electronic Prior Authorization Form (2017 NCP[employer])  Original [address]  [phone] Provide Exception Process Printed NoticeTransition N/A,Dr Zaman ePA [facility]. comMaximum Daily Dose of 1

## 2022-11-28 ENCOUNTER — HOSPITAL ENCOUNTER (OUTPATIENT)
Dept: PULMONOLOGY | Age: 62
Discharge: HOME | End: 2022-11-28
Payer: MEDICARE

## 2022-11-28 DIAGNOSIS — G47.33 OBSTRUCTIVE SLEEP APNEA SYNDROME: ICD-10-CM

## 2022-11-28 PROCEDURE — 94640 AIRWAY INHALATION TREATMENT: CPT

## 2022-11-28 PROCEDURE — 94060 EVALUATION OF WHEEZING: CPT

## 2022-11-28 PROCEDURE — 94729 DIFFUSING CAPACITY: CPT

## 2022-11-28 PROCEDURE — 94664 DEMO&/EVAL PT USE INHALER: CPT

## 2022-11-28 PROCEDURE — 94726 PLETHYSMOGRAPHY LUNG VOLUMES: CPT

## 2022-11-30 ENCOUNTER — OFFICE VISIT (OUTPATIENT)
Dept: NEUROLOGY | Age: 62
End: 2022-11-30
Payer: MEDICARE

## 2022-11-30 VITALS
SYSTOLIC BLOOD PRESSURE: 97 MMHG | DIASTOLIC BLOOD PRESSURE: 62 MMHG | WEIGHT: 176 LBS | HEART RATE: 85 BPM | HEIGHT: 64 IN | BODY MASS INDEX: 30.05 KG/M2

## 2022-11-30 DIAGNOSIS — I63.132 CEREBRAL INFARCTION DUE TO EMBOLISM OF LEFT CAROTID ARTERY (HCC): ICD-10-CM

## 2022-11-30 DIAGNOSIS — G25.81 RESTLESS LEGS SYNDROME: Primary | ICD-10-CM

## 2022-11-30 DIAGNOSIS — G47.19 EXCESSIVE DAYTIME SLEEPINESS: ICD-10-CM

## 2022-11-30 PROCEDURE — G8417 CALC BMI ABV UP PARAM F/U: HCPCS | Performed by: PSYCHIATRY & NEUROLOGY

## 2022-11-30 PROCEDURE — G8427 DOCREV CUR MEDS BY ELIG CLIN: HCPCS | Performed by: PSYCHIATRY & NEUROLOGY

## 2022-11-30 PROCEDURE — 3017F COLORECTAL CA SCREEN DOC REV: CPT | Performed by: PSYCHIATRY & NEUROLOGY

## 2022-11-30 PROCEDURE — 99214 OFFICE O/P EST MOD 30 MIN: CPT | Performed by: PSYCHIATRY & NEUROLOGY

## 2022-11-30 PROCEDURE — 1036F TOBACCO NON-USER: CPT | Performed by: PSYCHIATRY & NEUROLOGY

## 2022-11-30 PROCEDURE — G8484 FLU IMMUNIZE NO ADMIN: HCPCS | Performed by: PSYCHIATRY & NEUROLOGY

## 2022-11-30 RX ORDER — GABAPENTIN 300 MG/1
CAPSULE ORAL
Qty: 60 CAPSULE | Refills: 2 | Status: SHIPPED | OUTPATIENT
Start: 2022-11-30 | End: 2023-02-28

## 2022-11-30 RX ORDER — WARFARIN SODIUM 2.5 MG/1
TABLET ORAL
COMMUNITY
Start: 2022-11-16

## 2022-11-30 ASSESSMENT — ENCOUNTER SYMPTOMS
ALLERGIC/IMMUNOLOGIC NEGATIVE: 1
RESPIRATORY NEGATIVE: 1
GASTROINTESTINAL NEGATIVE: 1
EYES NEGATIVE: 1

## 2022-11-30 NOTE — PROGRESS NOTES
Subjective:      Patient ID: Yanna Schneider is a 57 y.o. female. HPI  Active problem restless legs syndrome on requip , klonopin and neurontin . Left hemispheric infarction with prior aphasia along with right hemiparesis resolved with TPA and left ICA terminus thrombus retrieval with atrial fibrillation, January 2016. The condition is she reports that she is till having issues with restless legs when she sits down any time which is disruptive . She is on requip 1 mg po q noon 3 mg q 6PM and 3mg po qhs keeping on klonopin 1 mg po qhs and neurontin 800 mg po qhs . She had sleep study in September through Dr Brittnee Balderas showing no sleep apnea with apnea hypopnea index 0.1 episodes per hour with severe periodic leg movements with PLM index 53 episodes per hour . MSLT with severe daytime sleepiness with mean sleep latency 4 minutes with no REM inset intrusion. Study was done for daytime sleepiness with concern she has narcolepsy placed on provigil 100 mg po bid taking this at 8 AM with second dose at 1 PM which will attenaute sleepiness. She does not feel this medication agravates restless legs  . She is going to bed from 11 PM to 1 AM falling asleep within 20 minutes sleeping to 8 AM . There will be one awakening able to go to back asleep . She has had mitral valve replacement along with cardiac pacemaker placement in December having some dyspnea on exertion . There is atrial fibrillation on coumadin . She remains on lipitor 10 mg po qd. There is normal language with no weakness ,numbness , bulbar or visual complaint  . There are no more transient head pains . Significant medication coumadin ,  lipitor 10 mg po qd, requip 1 mg po q noon ,  3 mg q 6 PM and 3mg po qhs, neurontin 800 mg po qhs, klonopin 1 mg po qhs, provigil 100 mg po bid 8AM and 1 PM  .Testing Head CT showed dense left MCA artery . CTA with left ICA terminus occlusion .  CT perfusion with large left hemispheric penumbra with only small ischemic core left basal ganglia . Patient did very well post intervention returning to normal exam with full motor strength with normal language . MRI of Head with tiny punctate infarction left caudate along with left frontal periventricular area . Cholesterol 118 , , TG 92, AMY normal, homocysteine normal , Hga1c 5.4 , antithrombin 3 80 (), lupus anticoagulant normal, prothrombin mutation normal , Factor V Leiden, anticardiolipin Ab negative . SAUL EF 50-55% . Moderate to severe MR, moderate TR . Carotid US  normal, September 2019 . Polysomnogram apnea hypopnea index 1.1 episodes per hour , August 2021 . MSLT mean sleep latency 4 minutes with no REM inset intrusion . Polysomnogram apnea hypopnea index 0.1 episodes per hour .  Periodic leg movements 523 episodes per hour , August 2023      Past Medical History:   Diagnosis Date    Arm pain Right and Left    Atrial fibrillation [facility])     Atrial fibrillation status post cardioversion [facility])     Atrial flutter (HCC)     Cancer (HCC)     skin    Carotid artery stenosis     Cerebral artery occlusion with cerebral infarction [facility])     Chest pain 11/2003    CHF (congestive heart failure) ([address]     Chipped tooth 12/01/2021    Top left    COPD (chronic obstructive pulmonary disease) ([address]     Depression     Former tobacco use     H/O: CVA (cerebrovascular accident)     1-    Heart palpitations     History of lateral epicondylitis of right elbow     And left elbow    Hx of blood clots     Hyperlipidemia     Hyperparathyroidism ([address]     Hypertensive chronic kidney disease with stage 1 through stage 4 chronic kidney disease, or unspecified chronic kidney disease     Long term current use of anticoagulant     Mitral regurgitation     Mitral valve insufficiency     Mitral valve regurgitation     Muscle spasm     Narcolepsy 10/2022    Nerve pain     Osteoporosis     Personal history of other medical treatment     Chronic diastolic congestive heart failure    Radial tunnel syndrome Right radial tunnel release     Restless legs     Sleep difficulties     Was diagnosed with Narcolepsy in 2022.     SOB (shortness of breath) 2021    Tennis elbow Right and left    Under care of team 2021    Cardiologist: Courtney Almeida, last visit 2021    Under care of team 2021    PCP: Dr. Kenrick Guthrie [place], last visit 2021    Under care of team 2021    Neurology: Dr. Rhiannon Gibbons, last visit 2021    Under care of team 2021    Urology: Dr. Megan Lang, last visit 10/2021    Wears glasses        Past Surgical History:   Procedure Laterality Date    ABLATION OF DYSRHYTHMIC FOCUS  2020    Dr. Jewels Buchanan  2021    R-L     CARDIOVERSION  2016    failed    CARDIOVERSION  2019     SECTION      COLONOSCOPY      ENDOSCOPY, COLON, DIAGNOSTIC      EYE SURGERY      Cataracts removed, bilaterally    MITRAL VALVE REPAIR N/A 2021    MITRAL VALVE REPLACE, MAZE, CLARIBEL performed by Jj Friend [facility] at [facility]      TRANSESOPHAGEAL ECHOCARDIOGRAM  10/05/2021    severe mitral regurg    WISDOM TOOTH EXTRACTION      WRIST GANGLION EXCISION Right        Family History   Problem Relation Age of Onset    Cancer Mother     No Known Problems Father     Cancer Sister     Peripheral Vascular Disease Sister        Social History     Socioeconomic History    Marital status:    Tobacco Use    Smoking status: Former     Packs/day: 0.25     Years: 15.00     Pack years: 3.75     Types: Cigarettes     Quit date: 7/15/1986     Years since quittin.4     Passive exposure: Never    Smokeless tobacco: Never   Vaping Use    Vaping Use: Never used   Substance and Sexual Activity    Alcohol use: Not Currently    Drug use: No    Sexual activity: Not Currently     Partners: Male       Current Outpatient Medications   Medication Sig Dispense Refill    warfarin (COUMADIN) 2.5 MG tablet       gabapentin (NEURONTIN) 300 MG capsule Take 1 po bid noon and 6 PM including 800 mg po qhs 60 capsule 2    modafinil (PROVIGIL) 100 MG tablet Take 1 tablet by mouth 2 times daily for 30 days. 60 tablet 0    clonazePAM (KLONOPIN) 1 MG tablet TAKE 1 TABLET BY MOUTH  EVERY NIGHT AT BEDTIME 90 tablet 0    albuterol sulfate HFA (VENTOLIN HFA) 108 (90 Base) MCG/ACT inhaler Inhale 2 puffs into the lungs 4 times daily as needed for Wheezing 54 g 1    gabapentin (NEURONTIN) 800 MG tablet Take 1 po qhs 90 tablet 1    potassium chloride (KLOR-CON M) 20 MEQ extended release tablet 2 tablet      desvenlafaxine succinate (PRISTIQ) 100 MG TB24 extended release tablet 1 tablet      rOPINIRole (REQUIP) 1 MG tablet TAKE 1 TABLET BY MOUTH UPON RISING 3 TABLETS BY MOUTH  AT 6 PM AND 3 TABLETS BY  MOUTH AT BEDTIME 630 tablet 3    levothyroxine (SYNTHROID) 25 MCG tablet Take 25 mcg by mouth daily      spironolactone (ALDACTONE) 25 MG tablet Take 25 mg by mouth daily      aspirin 81 MG EC tablet Take 1 tablet by mouth daily 30 tablet 3    metoprolol tartrate (LOPRESSOR) 25 MG tablet Take 0.5 tablets by mouth 2 times daily 60 tablet 3    furosemide (LASIX) 20 MG tablet Take 1 tablet by mouth daily 30 tablet 0    pantoprazole (PROTONIX) 40 MG tablet Take 1 tablet by mouth daily 30 tablet 0    alendronate (FOSAMAX) 70 MG tablet Take 70 mg by mouth every 7 days Takes on Tuesday.       baclofen (LIORESAL) 20 MG tablet Take 20 mg by mouth nightly       atorvastatin (LIPITOR) 10 MG tablet 1 tablet (Patient not taking: Reported on 8/5/2022)      Baclofen 20 MG/20ML SOLN 1 tablet with food or milk      furosemide (LASIX) 80 MG tablet Take 20 mg by mouth daily       Gabapentin, Once-Daily, 300 & 600 MG MISC 0.5 tablet      fluticasone (FLONASE) 50 MCG/ACT nasal spray 1 spray by Each Nostril route daily      Calcium Carb-Cholecalciferol 600-800 MG-UNIT TABS Take 1 tablet by mouth 2 times daily       atorvastatin (LIPITOR) 10 MG tablet Take 10 mg by mouth nightly  (Patient not taking: Reported on 8/5/2022)      Fluticasone-Salmeterol (ADVAIR HFA IN) Inhale 2 puffs into the lungs 2 times daily       desvenlafaxine succinate (PRISTIQ) 100 MG TB24 Take 100 mg by mouth daily       No current facility-administered medications for this visit. No Known Allergies      Review of Systems   Constitutional: Negative. HENT: Negative. Eyes: Negative. Respiratory: Negative. Cardiovascular:  Positive for leg swelling. Gastrointestinal: Negative. Endocrine: Negative. Genitourinary: Negative. Musculoskeletal: Negative. Allergic/Immunologic: Negative. Hematological: Negative. Psychiatric/Behavioral:  The patient is nervous/anxious. Objective:   Physical Exam  Vitals:    11/30/22 0944   BP: 97/62   Pulse: 85     weight: 176 lb (79.8 kg)    Neurological Examination  Ears /Nose/Throat: external ear . Normal exam  Neck and thyroid . Normal size  Respiratory . Breathsounds clear bilaterally  Cardiovascular: Auscultation of heart with regular rate and rhythm   Musculoskeletal. Muscle bulk and tone normal   Muscle strength 5/5 strength throughout   No dysmetria or dysdiadokinesis  No tremor   Normal fine motor  Orientation Alert and oriented x 3   Language process and speech normal . No aphasia   Cranial nerve 2 normal acuety and visual fields  Cranial nerve 3, 4 and 6 . Extraocular muscles are intact . Pupils are equal and reactive   Cranial nerve 5 . Intact corneal reflex. Normal facial sensation  Cranial nerve 7 normal exam   Cranial nerve 8. Grossly intact hearing   Cranial nerve 9 and 10. Symmetric palate elevation   Cranial nerve 11 , 5 out of 5 strength   Cranial Nerve 12 midline tongue . No atrophy  Sensation . Normal pinprick and light touch   Deep Tendon Reflexes normal  Plantar response flexor bilaterally    Assessment:      1. Restless legs syndrome    2. Cerebral infarction due to embolism of left carotid artery ([facility]    3.  Excessive daytime sleepiness    Will change neurontin to 300 mg po at noon and 6 PM keeping 800 mg po qhs staying on requip 1 mg po q noon 3 mg q 6PM and 3mg po qhs keeping on klonopin 1 mg po qhs      Plan:      As above

## 2022-12-03 NOTE — PROCEDURES
[address] [facility]                               PULMONARY FUNCTION    PATIENT NAME: Machelle Garner                     :        1960  MED REC NO:   [id number]                             ROOM:  ACCOUNT NO:   [de-identified]                           ADMIT DATE: 2022  PROVIDER:     Liz Spencer    DATE OF PROCEDURE:  2022    The patient's spirometry shows an obstructive flow-volume loop. FEV1 is  69% predicted with 3% change to 71% predicted. FVC 86% predicted with  no positive bronchodilator change. FEV1/FVC ratio postbronchodilator  67. Total lung capacity of 104% predicted, % predicted, and diffusion  capacity uncorrected 85% predicted. Corrected for alveolar volume 94%  predicted and airway resistance normal.    IMPRESSION:  Stage II COPD without a significant bronchodilator  response, mild hyperinflation. Normal diffusion capacity. Clinical  correlation advised.         Teo Cerna    D: 2022 12:45:15       T: 2022 12:47:38     [email or web address]  Job#: [id number]     Doc#: [id number]    CC:

## 2022-12-13 ENCOUNTER — OFFICE VISIT (OUTPATIENT)
Dept: PULMONOLOGY | Age: 62
End: 2022-12-13
Payer: MEDICARE

## 2022-12-13 VITALS
WEIGHT: 171 LBS | HEART RATE: 87 BPM | DIASTOLIC BLOOD PRESSURE: 67 MMHG | OXYGEN SATURATION: 100 % | RESPIRATION RATE: 18 BRPM | SYSTOLIC BLOOD PRESSURE: 126 MMHG | BODY MASS INDEX: 29.19 KG/M2 | HEIGHT: 64 IN

## 2022-12-13 DIAGNOSIS — G47.10 HYPERSOMNIA: Primary | ICD-10-CM

## 2022-12-13 PROCEDURE — G8417 CALC BMI ABV UP PARAM F/U: HCPCS | Performed by: INTERNAL MEDICINE

## 2022-12-13 PROCEDURE — G8427 DOCREV CUR MEDS BY ELIG CLIN: HCPCS | Performed by: INTERNAL MEDICINE

## 2022-12-13 PROCEDURE — 1036F TOBACCO NON-USER: CPT | Performed by: INTERNAL MEDICINE

## 2022-12-13 PROCEDURE — G8484 FLU IMMUNIZE NO ADMIN: HCPCS | Performed by: INTERNAL MEDICINE

## 2022-12-13 PROCEDURE — 3017F COLORECTAL CA SCREEN DOC REV: CPT | Performed by: INTERNAL MEDICINE

## 2022-12-13 PROCEDURE — 99214 OFFICE O/P EST MOD 30 MIN: CPT | Performed by: INTERNAL MEDICINE

## 2022-12-13 RX ORDER — MODAFINIL 200 MG/1
200 TABLET ORAL DAILY
Qty: 90 TABLET | Refills: 0 | Status: SHIPPED | OUTPATIENT
Start: 2022-12-13 | End: 2022-12-13 | Stop reason: CLARIF

## 2022-12-13 RX ORDER — MODAFINIL 200 MG/1
100 TABLET ORAL DAILY
Qty: 45 TABLET | Refills: 0 | Status: SHIPPED | OUTPATIENT
Start: 2022-12-13 | End: 2022-12-13 | Stop reason: CLARIF

## 2022-12-13 RX ORDER — MODAFINIL 200 MG/1
200 TABLET ORAL DAILY
Qty: 90 TABLET | Refills: 0 | Status: SHIPPED | OUTPATIENT
Start: 2022-12-13 | End: 2023-03-13

## 2022-12-13 RX ORDER — MODAFINIL 100 MG/1
100 TABLET ORAL DAILY
Qty: 90 TABLET | Refills: 0 | Status: SHIPPED | OUTPATIENT
Start: 2022-12-13 | End: 2023-03-13

## 2022-12-13 RX ORDER — MODAFINIL 200 MG/1
200 TABLET ORAL DAILY
Qty: 30 TABLET | Refills: 0 | Status: SHIPPED | OUTPATIENT
Start: 2022-12-13 | End: 2023-01-12

## 2022-12-13 RX ORDER — MODAFINIL 100 MG/1
100 TABLET ORAL DAILY
Qty: 30 TABLET | Refills: 0 | Status: SHIPPED | OUTPATIENT
Start: 2022-12-13 | End: 2023-01-12

## 2022-12-13 NOTE — PROGRESS NOTES
REASON FOR THE CONSULTATION:  Shortness of breath  Mitral valve replacement surgery SP  Recurrent heart failure  Hypersomnia  Possible sleep apnea  History of CVA  Atrial fibrillation  HISTORY OF PRESENT ILLNESS:    Nicole Story is being treated for associated. No significant sleep apnea CPAP or BiPAP. She is responding well to the use of her Provigil and she is 600 mg twice related symptoms are almost 50% is also periodic leg movements and. Has been on Klonopin. Polysomnography 24 narcolepsy nonobstructed. There was no REM onset naps on MSLT. She has hypothyroid state which recheck her thyroid replacement therapy levels. She has nodule in the right upper lobe. There is very likely a granuloma. He has history of atrial fibrillation which is rate controlled. She is on anticoagulation    She have had a mitral valve replacement surgery done in the past.  Now she has evidence of mitral regurgitation. He also history of CVA coronary artery disease. Takes her anticoagulant regularly. No bleeding. I will recommend that we check her thyroid function every  Already had COVID-vaccine and flu vaccine. Prescription for Proventil was written at [address] and the afternoon.           LUNG CANCER SCREENING     CRITERIA MET    []     CT ORDERED  []      CRITERIA NOT MET   [x]      REFUSED                    []        REASON CRITERIA NOT MET     SMOKING LESS THAN 30 PY  []      AGE LESS THAN 55 or GREATER 77 YEARS  []      QUIT SMOKING 15 YEARS OR GREATER   []      RECENT CT WITH IN 11 MONTHS    []      LIFE EXPECTANCY < 5 YEARS   []      SIGNS  AND SYMPTOMS OF LUNG CANCER   []         Immunization   Immunization History   Administered Date(s) Administered    COVID-19, MODERNA BLUE border, Primary or Immunocompromised, (age 12y+), IM, 100 mcg/0.5mL 03/08/2021, 04/05/2021    Influenza, FLUARIX, FLULAVAL, FLUZONE (age 10 mo+) AND AFLURIA, (age 1 y+), PF, 0.5mL 12/14/2021        Pneumococcal Vaccine     [] Up to date    [] Indicated   [] Refused  [] Contraindicated       Influenza Vaccine   [x] Up to date    [] Indicated   [] Refused  [] Contraindicated          PAST MEDICAL HISTORY:       Diagnosis Date    Arm pain Right and Left    Atrial fibrillation (HCC)     Atrial fibrillation status post cardioversion [facility])     Atrial flutter ([facility]     Cancer ([facility] [facility]     skin    Carotid artery stenosis     Cerebral artery occlusion with cerebral infarction ([facility] [address] [facility]     Chest pain 11/2003    CHF (congestive heart failure) ([facility] [facility]     Chipped tooth 12/01/2021    Top left    COPD (chronic obstructive pulmonary disease) ([facility] [address] [facility]     Depression     Former tobacco use     H/O: CVA (cerebrovascular accident)     1-    Heart palpitations     History of lateral epicondylitis of right elbow     And left elbow    Hx of blood clots     Hyperlipidemia     Hyperparathyroidism ([facility] [facility]     Hypertensive chronic kidney disease with stage 1 through stage 4 chronic kidney disease, or unspecified chronic kidney disease     Long term current use of anticoagulant     Mitral regurgitation     Mitral valve insufficiency     Mitral valve regurgitation     Muscle spasm     Narcolepsy 10/2022    Nerve pain     Osteoporosis     Personal history of other medical treatment     Chronic diastolic congestive heart failure    Radial tunnel syndrome Right radial tunnel release     Restless legs     Sleep difficulties     Was diagnosed with Narcolepsy in December 2022.     SOB (shortness of breath) 11/08/2021    Tennis elbow Right and left    Under care of team 12/01/2021    Cardiologist: Morgan Vazquez, last visit 8/2021    Under care of team 12/01/2021    PCP: Dr. Anuj Curran Dieterich, last visit 6/2021    Under care of team 12/01/2021    Neurology: Dr. Destinee Lockett, last visit 11/2021    Under care of team 12/01/2021    Urology: Dr. Linden Cali, last visit 10/2021    Wears glasses          Family History:       Problem Relation Age of Onset    Cancer Mother     No Known Problems Father     Cancer Sister     Peripheral Vascular Disease Sister        SURGICAL HISTORY:   Past Surgical History:   Procedure Laterality Date    ABLATION OF DYSRHYTHMIC FOCUS  2020    Dr. Kristan Bain  2021    R-L     CARDIOVERSION  2016    failed    CARDIOVERSION  2019     SECTION      COLONOSCOPY      ENDOSCOPY, COLON, DIAGNOSTIC      EYE SURGERY      Cataracts removed, bilaterally    MITRAL VALVE REPAIR N/A 2021    MITRAL VALVE REPLACE, MAZE, CLARIBEL performed by Lei Rodgers MD at [facility]      TRANSESOPHAGEAL ECHOCARDIOGRAM  10/05/2021    severe mitral regurg    WISDOM TOOTH EXTRACTION      WRIST GANGLION EXCISION Right            SOCIAL AND OCCUPATIONAL HEALTH:      There is no history of TB or TB exposure. There is no asbestos or silica dust exposure. The patient reports is no coal, foundry, quarry or [employer] exposure. Travel history reveals no. There is no history of recreational or IV drug use. There is no hot tube exposure. Pets none no significant occupational history    Occupational history not significant    TOBACCO:   reports that she quit smoking about 36 years ago. Her smoking use included cigarettes. She has a 3.75 pack-year smoking history. She has never been exposed to tobacco smoke. She has never used smokeless tobacco.  ETOH:   reports that she does not currently use alcohol. ALLERGIES:      No Known Allergies      Home Meds:   Prior to Admission medications    Medication Sig Start Date End Date Taking? Authorizing Provider   warfarin (COUMADIN) 2.5 MG tablet  22  Yes Historical Provider, MD   gabapentin (NEURONTIN) 300 MG capsule Take 1 po bid noon and 6 PM including 800 mg po qhs 22 Yes Dangelo Armenta MD   modafinil (PROVIGIL) 100 MG tablet Take 1 tablet by mouth 2 times daily for 30 days.  22 Yes Kenton Almanzar MD   clonazePAM [facility]) 1 MG tablet TAKE 1 TABLET BY MOUTH  EVERY NIGHT AT BEDTIME 11/17/22 3/17/23 Yes Yuan Valera MD   albuterol sulfate HFA (VENTOLIN HFA) 108 (90 Base) MCG/ACT inhaler Inhale 2 puffs into the lungs 4 times daily as needed for Wheezing 8/5/22  Yes Dave oH MD   gabapentin (NEURONTIN) 800 MG tablet Take 1 po qhs 6/30/22 12/30/22 Yes Yuan Valera MD   potassium chloride (KLOR-CON M) 20 MEQ extended release tablet 2 tablet   Yes Historical Provider, MD   desvenlafaxine succinate (PRISTIQ) 100 MG TB24 extended release tablet 1 tablet   Yes Historical Provider, MD   rOPINIRole (REQUIP) 1 MG tablet TAKE 1 TABLET BY MOUTH UPON RISING 3 TABLETS BY MOUTH  AT 6 PM AND 3 TABLETS BY  MOUTH AT BEDTIME 4/4/22  Yes Yuan Valera MD   levothyroxine (SYNTHROID) 25 MCG tablet Take 25 mcg by mouth daily   Yes Historical Provider, MD   spironolactone (ALDACTONE) 25 MG tablet Take 25 mg by mouth daily 3/5/22  Yes Historical Provider, MD   aspirin 81 MG EC tablet Take 1 tablet by mouth daily 12/14/21  Yes SARAH Delarosa CNP   metoprolol tartrate (LOPRESSOR) 25 MG tablet Take 0.5 tablets by mouth 2 times daily 12/14/21  Yes SARAH Delarosa CNP   furosemide (LASIX) 20 MG tablet Take 1 tablet by mouth daily 12/14/21  Yes SARAH Delarosa CNP   pantoprazole (PROTONIX) 40 MG tablet Take 1 tablet by mouth daily 12/14/21  Yes SARAH Delarosa CNP   alendronate (FOSAMAX) 70 MG tablet Take 70 mg by mouth every 7 days Takes on Tuesday.    Yes Historical Provider, MD   baclofen (LIORESAL) 20 MG tablet Take 20 mg by mouth nightly    Yes Historical Provider, MD   atorvastatin (LIPITOR) 10 MG tablet 1 tablet  Patient not taking: Reported on 8/5/2022    Historical Provider, MD   Baclofen 20 MG/20ML SOLN 1 tablet with food or milk    Historical Provider, MD   furosemide (LASIX) 80 MG tablet Take 20 mg by mouth daily     Historical Provider, MD   Gabapentin, Once-Daily, 300 & 600 MG MISC 0.5 tablet Historical Provider, MD   fluticasone (FLONASE) 50 MCG/ACT nasal spray 1 spray by Each Nostril route daily    Historical Provider, MD   Calcium Carb-Cholecalciferol 600-800 MG-UNIT TABS Take 1 tablet by mouth 2 times daily     Historical Provider, MD   atorvastatin (LIPITOR) 10 MG tablet Take 10 mg by mouth nightly   Patient not taking: Reported on 8/5/2022    Historical Provider, MD   Fluticasone-Salmeterol (ADVAIR HFA IN) Inhale 2 puffs into the lungs 2 times daily     Historical Provider, MD   desvenlafaxine succinate (PRISTIQ) 100 MG TB24 Take 100 mg by mouth daily    Historical Provider, MD              REVIEW OF SYSTEMS: Review the system was conducted for all other 10 system no additional information was obtained other than what is noted above.     CONSTITUTIONAL:  negative for  fevers, chills, sweats, fatigue, malaise, anorexia and weight loss  EYES:  negative for  double vision, blurred vision, dry eyes, eye discharge and redness  HEENT:  negative for  hearing loss, tinnitus, ear drainage, earaches and nasal congestion  RESPIRATORY:  See hpi  CARDIOVASCULAR:  negative for  chest pain,, palpitations, orthopnea, PND  GASTROINTESTINAL:  negative for nausea, vomiting, change in bowel habits, diarrhea, constipation, abdominal pain, pruritus, abdominal mass and abdominal distention  GENITOURINARY:  negative for frequency, dysuria, nocturia, urinary incontinence and hesitancy  INTEGUMENT  negative for rash, skin lesion(s), dryness, skin color change, changes in lesion, pruritus and changes in hair  HEMATOLOGIC/LYMPHATIC:  negative for easy bruising, bleeding, lymphadenopathy, petechiae and swelling/edema  ALLERGIC/IMMUNOLOGIC:  negative for recurrent infections, urticaria and drug reactions  ENDOCRINE:  negative for heat intolerance, cold intolerance, tremor, weight changes and change in bowel habits  MUSCULOSKELETAL:  negative for  myalgias, arthralgias, pain, joint swelling, stiff joints and decreased range of motion  NEUROLOGICAL:  negative for headaches, dizziness, seizures, memory problems, speech problems, visual disturbance and coordination problems  BEHAVIOR/PSYCH:  negative for poor appetite, increased appetite, decreased sleep, increased sleep, decreased energy level, increased energy level and poor concentration  Skin no rash no dermatitis  Vitals:  /67 (Site: Right Upper Arm, Position: Sitting, Cuff Size: Medium Adult)   Pulse 87   Resp 18   Ht 5' 4\" (1.626 m)   Wt 171 lb (77.6 kg)   SpO2 100%   BMI 29.35 kg/m²     PHYSICAL EXAM:  General Appearance:    Alert, cooperative, no distress, appears stated age] no respiratory distress not using accessory muscles   Head:    Normocephalic, without obvious abnormality, atraumatic      Eyes:    PERRL, conjunctiva/corneas clear, EOM's intact no jaundice no Jose Angel syndrome   Ears:    Normal  external ear canals, both ears   Nose:   Nares normal, septum midline, mucosa normal, no drainage        or sinus tenderness no nasal polyps   Throat:   Lips, mucosa, and tongue normal; teeth and gums normal normal throat examination oropharyngeal orifice not compromised   Neck:   Supple, symmetrical, trachea midline, no adenopathy;     thyroid:  no enlargement/tenderness/nodules; no carotid    bruit , JVD not elevated hepatojugular reflux negative neck is not short of fat   Back:     Symmetric, no curvature, ROM normal, no CVA tenderness   Lungs:   AP diameter is not increased percussion note is normally resonant except slight decreased at the left base breathing vesicular no rales rhonchi are audible expiration not prolonged no pleural friction rub is audible   Chest Wall:    No tenderness or deformity      Heart:    Regular rate and rhythm, S1 and S2 normal, no murmur, rub        or gallop no rvh grade 3 wave 6 systolic murmur is heard in the left parasternal area is also present S4                          Abdomen: Pulses:                              Skin:                  Lymph nodes:                    Neurologic:                  Soft, non-tender, bowel sounds active all four quadrants,     no masses, no organomegaly         2+ and symmetric all extremities     Skin color, texture, turgor normal, no rashes or lesions       Cervical, supraclavicular not enlarged or matted or tender      CNII-XII intact, normal strength 5/5 . Sensation grossly normal  and reflexes normal 2+  throughout     Clubbing No  Lower ext edema bilateral pedal edema left more than right. Pitting in nature  Upper ext edema no edema         Musculoskeletal no synovitis. No joint swelling or tenderness        CBC: No results for input(s): WBC, HGB, PLT in the last 72 hours. BMP:  No results for input(s): NA, K, CL, CO2, BUN, CREATININE, GLUCOSE in the last 72 hours. Hepatic: No results for input(s): AST, ALT, ALB, BILITOT, ALKPHOS in the last 72 hours. Amylase: No results found for: AMYLASE  Lipase: No results found for: LIPASE  Troponin: No results for input(s): TROPONINI in the last 72 hours. BNP: No results for input(s): BNP in the last 72 hours. Lipids: No results for input(s): CHOL, HDL in the last 72 hours. Invalid input(s): LDLCALCU  ABGs: No results found for: PHART, PO2ART, [id number]  INR: No results for input(s): INR in the last 72 hours. Thyroid: No results found for: T4, TSH  Urinalysis: No results for input(s): BACTERIA, BLOODU, CLARITYU, COLORU, PHUR, PROTEINU, RBCUA, SPECGRAV, BILIRUBINUR, NITRU, WBCUA, LEUKOCYTESUR, GLUCOSEU in the last 72 hours.       Cultures:-  No blood cultures    CXR  Show cardiomegaly possible pulmonary congestion      CT Scans  As noted above patient does have a nodule about 7 mm in size in the right upper lobe region which is is calcified and very likely is a granuloma    Echo    No recent echo reports are available            IMPRESSION:        Effort dyspnea  : Recurrent heart failure            History of mitral valve replacement surgery  Pacemaker placement  History of CVA treated with endovascular surgery and procedure  Atrial fibrillation on anticoagulation  Insomnia  PLAN:     Her symptoms are responding well to the use of Provigil 100 mg twice a day. She will increase the dose of Provigil to 200 in the morning and afternoon dose. No evidence of sleep apnea, no narcolepsy  No recurrence of stroke    Been stable    Nodule in the right upper lobe with very likely normal.    He will continue Klonopin which is controlling PLM's under care of. Mitral regurgitation. She is under care of cardiology. He will continue failure relation        Atrial fibrillation rate controlled. Continue anticoagulation  He has mitral regurgitation.   Continue to follow with cardiology  Prescriptions written for Provigil, 200 in the morning   100 mg in the afternoon        Dictated with Dr. Rich Munoz

## 2023-01-11 DIAGNOSIS — G47.10 HYPERSOMNIA: ICD-10-CM

## 2023-01-11 RX ORDER — MODAFINIL 200 MG/1
TABLET ORAL
Qty: 90 TABLET | Refills: 0 | OUTPATIENT
Start: 2023-03-10 | End: 2023-06-08

## 2023-02-27 RX ORDER — GABAPENTIN 300 MG/1
CAPSULE ORAL
Qty: 60 CAPSULE | Refills: 0 | Status: SHIPPED | OUTPATIENT
Start: 2023-02-27 | End: 2023-05-27

## 2023-02-27 NOTE — TELEPHONE ENCOUNTER
Pharmacy requesting refill of Gabapentin 300 mg.       Medication active on med list yes      Date of last Rx: 11/30/22 with 2 refills          verified by SR, RMA      Date of last appointment 11/30/22    Next Visit Date:  3/8/2023

## 2023-03-06 DIAGNOSIS — G47.10 HYPERSOMNIA: ICD-10-CM

## 2023-03-07 NOTE — TELEPHONE ENCOUNTER
Received rx request from [facility] delivery for Provigil 100mg and Provigil 200mg. Patient is current. Please sign the attached script.

## 2023-03-08 ENCOUNTER — TELEMEDICINE (OUTPATIENT)
Dept: NEUROLOGY | Age: 63
End: 2023-03-08
Payer: MEDICARE

## 2023-03-08 DIAGNOSIS — I63.132 CEREBRAL INFARCTION DUE TO EMBOLISM OF LEFT CAROTID ARTERY (HCC): ICD-10-CM

## 2023-03-08 DIAGNOSIS — G47.33 OBSTRUCTIVE SLEEP APNEA SYNDROME: ICD-10-CM

## 2023-03-08 DIAGNOSIS — G25.81 RESTLESS LEGS SYNDROME: Primary | ICD-10-CM

## 2023-03-08 DIAGNOSIS — G47.19 EXCESSIVE DAYTIME SLEEPINESS: ICD-10-CM

## 2023-03-08 DIAGNOSIS — I48.91 ATRIAL FIBRILLATION, UNSPECIFIED TYPE (HCC): ICD-10-CM

## 2023-03-08 PROCEDURE — 99214 OFFICE O/P EST MOD 30 MIN: CPT | Performed by: PSYCHIATRY & NEUROLOGY

## 2023-03-08 PROCEDURE — G8427 DOCREV CUR MEDS BY ELIG CLIN: HCPCS | Performed by: PSYCHIATRY & NEUROLOGY

## 2023-03-08 PROCEDURE — G8417 CALC BMI ABV UP PARAM F/U: HCPCS | Performed by: PSYCHIATRY & NEUROLOGY

## 2023-03-08 PROCEDURE — 1036F TOBACCO NON-USER: CPT | Performed by: PSYCHIATRY & NEUROLOGY

## 2023-03-08 PROCEDURE — 3017F COLORECTAL CA SCREEN DOC REV: CPT | Performed by: PSYCHIATRY & NEUROLOGY

## 2023-03-08 PROCEDURE — G8484 FLU IMMUNIZE NO ADMIN: HCPCS | Performed by: PSYCHIATRY & NEUROLOGY

## 2023-03-08 RX ORDER — ROPINIROLE 2 MG/1
TABLET, FILM COATED ORAL
Qty: 450 TABLET | Refills: 1 | Status: SHIPPED | OUTPATIENT
Start: 2023-03-08

## 2023-03-08 RX ORDER — ENOXAPARIN SODIUM 100 MG/ML
INJECTION SUBCUTANEOUS
COMMUNITY
Start: 2022-12-22

## 2023-03-08 NOTE — TELEPHONE ENCOUNTER
I am sorry I know you can only signed for 30 days.  This has to be a 90 day script because it is a mail in so this script should wait for Dr. Natasha Ingram

## 2023-03-08 NOTE — PROGRESS NOTES
Subjective:      Patient ID: Brook rTuong is a 57 y.o. female. HPI  Active problem restless legs syndrome readjusting neurontin on requip and klonopin . Left hemispheric infarction with prior aphasia along with right hemiparesis resolved with TPA and left ICA terminus thrombus retrieval with atrial fibrillation, January 2016. . Narcolepsy followed by Dr Loli Ricks . The condition is she reports that change of neurontin dosage did not do signifcant change restless legs  . She was on neurontin 300 mg po q 12 noon and q 6 PM with 800 mg po qhs . Neurontin 300 mg po bid at 12 noon and 6PM was weaned by PMD thinking that she was taking too much medication remaining on 800 mg po qhs with restless legs being about the same  . She is on requip 1 mg po q noon 3 mg q 6PM and 3mg po qhs and klonopin 1 mg po qhs. Restless legs are not any better . She reports that restless legs anytime she sits down more in afternoon . She is unsure if requip is making a change . She is going to bed at 12 AM taking one hour to fall asleep. There will be restless legs at night as she falling asleep. She had sleep study in August through Dr Loli Ricks showing no sleep apnea with apnea hypopnea index 0.1 episodes per hour with severe periodic leg movements with PLM index 53 episodes per hour . MSLT with severe daytime sleepiness with mean sleep latency 4 minutes with no REM inset intrusion. Study was done for daytime sleepiness with concern she has narcolepsy placed on provigil 100 mg po bid taking this at 8 AM with second dose at 1 PM which will attenaute sleepiness. She does not feel this medication agravates restless legs. She has had mitral valve replacement along with cardiac pacemaker placement in December having some dyspnea on exertion . There is atrial fibrillation on coumadin . She remains on lipitor 10 mg po qd. There is normal language with no weakness ,numbness , bulbar or visual complaint  . There are no more transient head pains . Significant medication coumadin ,  lipitor 10 mg po qd, requip 1 mg po q noon ,  3 mg q 6 PM and 3mg po qhs, neurontin 800 mg po qhs, klonopin 1 mg po qhs, provigil 100 mg po bid 8AM and 1 PM  .Testing Head CT showed dense left MCA artery . CTA with left ICA terminus occlusion . CT perfusion with large left hemispheric penumbra with only small ischemic core left basal ganglia . Patient did very well post intervention returning to normal exam with full motor strength with normal language . MRI of Head with tiny punctate infarction left caudate along with left frontal periventricular area . Cholesterol 118 , , TG 92, AMY normal, homocysteine normal , Hga1c 5.4 , antithrombin 3 80 (), lupus anticoagulant normal, prothrombin mutation normal , Factor V Leiden, anticardiolipin Ab negative . SAUL EF 50-55% . Moderate to severe MR, moderate TR . Carotid US  normal, September 2019 . Polysomnogram apnea hypopnea index 1.1 episodes per hour , August 2021 . MSLT mean sleep latency 4 minutes with no REM inset intrusion . Polysomnogram apnea hypopnea index 0.1 episodes per hour .  Periodic leg movements 523 episodes per hour , August 2022      Past Medical History:   Diagnosis Date    Arm pain Right and Left    Atrial fibrillation [facility])     Atrial fibrillation status post cardioversion [facility])     Atrial flutter (HCC)     Cancer (HCC)     skin    Carotid artery stenosis     Cerebral artery occlusion with cerebral infarction [facility])     Chest pain 11/2003    CHF (congestive heart failure) ([facility] [address]     Chipped tooth 12/01/2021    Top left    COPD (chronic obstructive pulmonary disease) ([facility])     Depression     Former tobacco use     H/O: CVA (cerebrovascular accident)     1-    Heart palpitations     History of lateral epicondylitis of right elbow     And left elbow    Hx of blood clots     Hyperlipidemia     Hyperparathyroidism ([facility] [address]     Hypertensive chronic kidney disease with stage 1 through stage 4 chronic kidney disease, or unspecified chronic kidney disease     Long term current use of anticoagulant     Mitral regurgitation     Mitral valve insufficiency     Mitral valve regurgitation     Muscle spasm     Narcolepsy 10/2022    Nerve pain     Osteoporosis     Personal history of other medical treatment     Chronic diastolic congestive heart failure    Radial tunnel syndrome Right radial tunnel release     Restless legs     Sleep difficulties     Was diagnosed with Narcolepsy in 2022.     SOB (shortness of breath) 2021    Tennis elbow Right and left    Under care of team 2021    Cardiologist: Lyn Martinez, last visit 2021    Under care of team 2021    PCP: Dr. Renita Vasquez Casco, last visit 2021    Under care of team 2021    Neurology: Dr. Ludivina Bass, last visit 2021    Under care of team 2021    Urology: Dr. Natasha Robles, last visit 10/2021    Wears glasses        Past Surgical History:   Procedure Laterality Date    ABLATION OF DYSRHYTHMIC FOCUS  2020    Dr. Petra Street  2021    R-L     CARDIOVERSION  2016    failed    CARDIOVERSION  2019     SECTION      COLONOSCOPY      ENDOSCOPY, COLON, DIAGNOSTIC      EYE SURGERY      Cataracts removed, bilaterally    MITRAL VALVE REPAIR N/A 2021    MITRAL VALVE REPLACE, MAZE, CLARIBEL performed by Yumiko Meek MD at [facility]      TRANSESOPHAGEAL ECHOCARDIOGRAM  10/05/2021    severe mitral regurg    WISDOM TOOTH EXTRACTION      WRIST GANGLION EXCISION Right        Family History   Problem Relation Age of Onset    Cancer Mother     No Known Problems Father     Cancer Sister     Peripheral Vascular Disease Sister        Social History     Socioeconomic History    Marital status:      Spouse name: None    Number of children: None    Years of education: None    Highest education level: None   Tobacco Use    Smoking status: Former     Packs/day: 0.25 Years: 15.00     Pack years: 3.75     Types: Cigarettes     Quit date: 7/15/1986     Years since quittin.6     Passive exposure: Never    Smokeless tobacco: Never   Vaping Use    Vaping Use: Never used   Substance and Sexual Activity    Alcohol use: Not Currently    Drug use: No    Sexual activity: Not Currently     Partners: Male       Current Outpatient Medications   Medication Sig Dispense Refill    rOPINIRole (REQUIP) 2 MG tablet Husam 1 po q 12 noon  2 po q 6PM and 2 po qhs 450 tablet 1    modafinil (PROVIGIL) 200 MG tablet Take 1 tablet by mouth daily for 90 days. Take 1 tablet by mouth daily in the morning 90 tablet 0    modafinil (PROVIGIL) 100 MG tablet Take 1 tablet by mouth daily for 90 days. Take 1 tablet by mouth daily, in the evening (Patient taking differently: Take 100 mg by mouth daily.) 90 tablet 0    warfarin (COUMADIN) 2.5 MG tablet       clonazePAM (KLONOPIN) 1 MG tablet TAKE 1 TABLET BY MOUTH  EVERY NIGHT AT BEDTIME 90 tablet 0    albuterol sulfate HFA (VENTOLIN HFA) 108 (90 Base) MCG/ACT inhaler Inhale 2 puffs into the lungs 4 times daily as needed for Wheezing 54 g 1    gabapentin (NEURONTIN) 800 MG tablet Take 1 po qhs (Patient taking differently: 600 mg.  Take 1 po qhs) 90 tablet 1    potassium chloride (KLOR-CON M) 20 MEQ extended release tablet 2 tablet      desvenlafaxine succinate (PRISTIQ) 100 MG TB24 extended release tablet 1 tablet      rOPINIRole (REQUIP) 1 MG tablet TAKE 1 TABLET BY MOUTH UPON RISING 3 TABLETS BY MOUTH  AT 6 PM AND 3 TABLETS BY  MOUTH AT BEDTIME 630 tablet 3    levothyroxine (SYNTHROID) 25 MCG tablet Take 25 mcg by mouth daily      spironolactone (ALDACTONE) 25 MG tablet Take 25 mg by mouth daily      aspirin 81 MG EC tablet Take 1 tablet by mouth daily 30 tablet 3    metoprolol tartrate (LOPRESSOR) 25 MG tablet Take 0.5 tablets by mouth 2 times daily 60 tablet 3    furosemide (LASIX) 20 MG tablet Take 1 tablet by mouth daily (Patient taking differently: Take 40 mg by mouth daily) 30 tablet 0    pantoprazole (PROTONIX) 40 MG tablet Take 1 tablet by mouth daily 30 tablet 0    alendronate (FOSAMAX) 70 MG tablet Take 70 mg by mouth every 7 days Takes on Tuesday. baclofen (LIORESAL) 20 MG tablet Take 20 mg by mouth nightly       enoxaparin (LOVENOX) 80 MG/0.8ML  (Patient not taking: Reported on 3/8/2023)      gabapentin (NEURONTIN) 300 MG capsule TAKE ONE CAPSULE BY MOUTH TWICE A DAY AT NOON AND 6PM. (Patient not taking: Reported on 3/8/2023) 60 capsule 0    Baclofen 20 MG/20ML SOLN 1 tablet with food or milk      Calcium Carb-Cholecalciferol 600-800 MG-UNIT TABS Take 1 tablet by mouth 2 times daily       desvenlafaxine succinate (PRISTIQ) 100 MG TB24 Take 100 mg by mouth daily       No current facility-administered medications for this visit. No Known Allergies      Review of Systems   Constitutional: Negative. HENT: Negative. Eyes: Negative. Respiratory: Negative. Cardiovascular:  Positive for leg swelling. Gastrointestinal: Negative. Endocrine: Negative. Genitourinary: Negative. Musculoskeletal: Negative. Allergic/Immunologic: Negative. Hematological: Negative. Psychiatric/Behavioral:  The patient is nervous/anxious. Objective:   Physical Exam  There were no vitals filed for this visit. weight:      Neurological Examination  Ears /Nose/Throat: external ear . Normal exam  Neck and thyroid . Normal size  Respiratory . Breathsounds clear bilaterally  Cardiovascular: Auscultation of heart with regular rate and rhythm   Musculoskeletal. Muscle bulk and tone normal   Muscle strength 5/5 strength throughout   No dysmetria or dysdiadokinesis  No tremor   Normal fine motor  Orientation Alert and oriented x 3   Language process and speech normal . No aphasia   Cranial nerve 2 normal acuety and visual fields  Cranial nerve 3, 4 and 6 . Extraocular muscles are intact . Pupils are equal and reactive   Cranial nerve 5 .  Intact corneal reflex. Normal facial sensation  Cranial nerve 7 normal exam   Cranial nerve 8. Grossly intact hearing   Cranial nerve 9 and 10. Symmetric palate elevation   Cranial nerve 11 , 5 out of 5 strength   Cranial Nerve 12 midline tongue . No atrophy  Sensation . Normal pinprick and light touch   Deep Tendon Reflexes normal  Plantar response flexor bilaterally    Assessment:      1. Restless legs syndrome    2. Excessive daytime sleepiness    3. Obstructive sleep apnea syndrome    4. Cerebral infarction due to embolism of left carotid artery ([facility] [address]    5. Atrial fibrillation, unspecified type ([facility] [address]    Will readjust requip to 2 mg po q noon 2 mg po q 6 PM and 4 mg po qhs staying on neurontin 800 mg po qhs and klonopin 1 mg po qhs     Plan:      As above        Gonzaloolga June, was evaluated through a synchronous (real-time) audio-video encounter. The patient (or guardian if applicable) is aware that this is a billable service, which includes applicable co-pays. This Virtual Visit was conducted with patient's (and/or legal guardian's) consent. The visit was conducted pursuant to the emergency declaration under the [address] [place] [address], [address] [place] [address] authority and the [employer] Act. Patient identification was verified, and a caregiver was present when appropriate. --Martin Cota MD on 3/8/2023 at 11:36 AM    An electronic signature was used to authenticate this note.

## 2023-03-16 RX ORDER — MODAFINIL 100 MG/1
100 TABLET ORAL DAILY
Qty: 30 TABLET | Refills: 0 | Status: SHIPPED | OUTPATIENT
Start: 2023-03-16 | End: 2023-03-17 | Stop reason: SDUPTHER

## 2023-03-16 RX ORDER — MODAFINIL 200 MG/1
TABLET ORAL
Qty: 90 TABLET | Refills: 3 | Status: SHIPPED | OUTPATIENT
Start: 2023-03-16 | End: 2023-06-14

## 2023-03-17 ENCOUNTER — OFFICE VISIT (OUTPATIENT)
Dept: PULMONOLOGY | Age: 63
End: 2023-03-17
Payer: MEDICARE

## 2023-03-17 VITALS
SYSTOLIC BLOOD PRESSURE: 124 MMHG | BODY MASS INDEX: 30.05 KG/M2 | HEIGHT: 64 IN | RESPIRATION RATE: 16 BRPM | HEART RATE: 68 BPM | OXYGEN SATURATION: 97 % | WEIGHT: 176 LBS | DIASTOLIC BLOOD PRESSURE: 72 MMHG

## 2023-03-17 DIAGNOSIS — R53.1 RIGHT SIDED WEAKNESS: ICD-10-CM

## 2023-03-17 DIAGNOSIS — I48.91 ATRIAL FIBRILLATION STATUS POST CARDIOVERSION (HCC): ICD-10-CM

## 2023-03-17 DIAGNOSIS — G47.10 HYPERSOMNIA: Primary | ICD-10-CM

## 2023-03-17 DIAGNOSIS — G81.91 RIGHT HEMIPARESIS (HCC): ICD-10-CM

## 2023-03-17 DIAGNOSIS — I34.0 SEVERE MITRAL REGURGITATION: ICD-10-CM

## 2023-03-17 PROCEDURE — 1036F TOBACCO NON-USER: CPT | Performed by: INTERNAL MEDICINE

## 2023-03-17 PROCEDURE — G8484 FLU IMMUNIZE NO ADMIN: HCPCS | Performed by: INTERNAL MEDICINE

## 2023-03-17 PROCEDURE — 99214 OFFICE O/P EST MOD 30 MIN: CPT | Performed by: INTERNAL MEDICINE

## 2023-03-17 PROCEDURE — 3017F COLORECTAL CA SCREEN DOC REV: CPT | Performed by: INTERNAL MEDICINE

## 2023-03-17 PROCEDURE — G8417 CALC BMI ABV UP PARAM F/U: HCPCS | Performed by: INTERNAL MEDICINE

## 2023-03-17 PROCEDURE — G8428 CUR MEDS NOT DOCUMENT: HCPCS | Performed by: INTERNAL MEDICINE

## 2023-03-17 RX ORDER — MODAFINIL 100 MG/1
100 TABLET ORAL DAILY
Qty: 30 TABLET | Refills: 0 | Status: SHIPPED | OUTPATIENT
Start: 2023-03-17 | End: 2023-04-16

## 2023-03-17 ASSESSMENT — SLEEP AND FATIGUE QUESTIONNAIRES
HOW LIKELY ARE YOU TO NOD OFF OR FALL ASLEEP WHILE WATCHING TV: 3
ESS TOTAL SCORE: 18
HOW LIKELY ARE YOU TO NOD OFF OR FALL ASLEEP IN A CAR, WHILE STOPPED FOR A FEW MINUTES IN TRAFFIC: 1
HOW LIKELY ARE YOU TO NOD OFF OR FALL ASLEEP WHILE SITTING AND TALKING TO SOMEONE: 2
HOW LIKELY ARE YOU TO NOD OFF OR FALL ASLEEP WHILE LYING DOWN TO REST IN THE AFTERNOON WHEN CIRCUMSTANCES PERMIT: 0
HOW LIKELY ARE YOU TO NOD OFF OR FALL ASLEEP WHEN YOU ARE A PASSENGER IN A CAR FOR AN HOUR WITHOUT A BREAK: 3
HOW LIKELY ARE YOU TO NOD OFF OR FALL ASLEEP WHILE SITTING AND READING: 3
HOW LIKELY ARE YOU TO NOD OFF OR FALL ASLEEP WHILE SITTING QUIETLY AFTER LUNCH WITHOUT ALCOHOL: 3
HOW LIKELY ARE YOU TO NOD OFF OR FALL ASLEEP WHILE SITTING INACTIVE IN A PUBLIC PLACE: 3

## 2023-03-17 NOTE — PROGRESS NOTES
REASON FOR THE CONSULTATION:  Shortness of breath  Mitral valve replacement surgery SP  Recurrent heart failure  Hypersomnia  Possible sleep apnea  History of CVA  Atrial fibrillation  Restless leg syndrome  HISTORY OF PRESENT ILLNESS: Patient does present to the complex issue. She has difficulty falling asleep at night. If she. She does not have a fixed time to go to bed and she will keep on working on her computer to watch movies or reading while in bed. She wakes up frequently: And do things around the house he does not have a fixed time to get up in the morning. The daytime she will take short naps. She had a sleep study done which did not reveal any significant apnea. She also had an MSLT done which did not to reveal any evidence of narcolepsy although the latency to sleep onset was rather short and suggestive of hypersomnia syndrome. The review of the medications she is on indicates that she is Klonopin, gabapentin teen, she is also taking Requip large dose. She is also on Provigil during the daytime to stay awake. I am afraid that she is on multiple drugs which are probably interacting with each other and confusing the oral sleep-wake schedule. She has a history of atrial fibrillation which is rate controlled. She is on anticoagulation. She have had a CVA in the past and has history of left-sided weakness. No recurrence of the stroke. She has ischemic heart disease. She also has mitral regurgitation and has a mitral valve replacement surgery in the past.  He already had COVID-vaccine flu vaccine  Pneumovax. Patient was accompanied by her .   LUNG CANCER SCREENING     CRITERIA MET    []     CT ORDERED  []      CRITERIA NOT MET   [x]      REFUSED                    []        REASON CRITERIA NOT MET     SMOKING LESS THAN 30 PY  []      AGE LESS THAN 55 or GREATER 77 YEARS  []      QUIT SMOKING 15 YEARS OR GREATER   []      RECENT CT WITH IN 11 MONTHS    []      LIFE EXPECTANCY < 5 YEARS   []      SIGNS  AND SYMPTOMS OF LUNG CANCER   []         Immunization   Immunization History   Administered Date(s) Administered    COVID-19, MODERNA BLUE border, Primary or Immunocompromised, (age 12y+), IM, 100 mcg/0.5mL 03/08/2021, 04/05/2021    Influenza, FLUARIX, FLULAVAL, FLUZONE (age 6 mo+) AND AFLURIA, (age 3 y+), PF, 0.5mL 12/14/2021        Pneumococcal Vaccine     [] Up to date    [] Indicated   [] Refused  [] Contraindicated       Influenza Vaccine   [x] Up to date    [] Indicated   [] Refused  [] Contraindicated          PAST MEDICAL HISTORY:       Diagnosis Date    Arm pain Right and Left    Atrial fibrillation (HCC)     Atrial fibrillation status post cardioversion (HCC)     Atrial flutter (HCC)     Cancer (HCC)     skin    Carotid artery stenosis     Cerebral artery occlusion with cerebral infarction ([facility])     Chest pain 11/2003    CHF (congestive heart failure) ([facility])     Chipped tooth 12/01/2021    Top left    COPD (chronic obstructive pulmonary disease) ([facility])     Depression     Former tobacco use     H/O: CVA (cerebrovascular accident)     1-    Heart palpitations     History of lateral epicondylitis of right elbow     And left elbow    Hx of blood clots     Hyperlipidemia     Hyperparathyroidism (HCC)     Hypertensive chronic kidney disease with stage 1 through stage 4 chronic kidney disease, or unspecified chronic kidney disease     Long term current use of anticoagulant     Mitral regurgitation     Mitral valve insufficiency     Mitral valve regurgitation     Muscle spasm     Narcolepsy 10/2022    Nerve pain     Osteoporosis     Personal history of other medical treatment     Chronic diastolic congestive heart failure    Radial tunnel syndrome Right radial tunnel release     Restless legs     Sleep difficulties     Was diagnosed with Narcolepsy in December 2022.    SOB (shortness of breath) 11/08/2021    Tennis elbow Right and left    Under care of team 12/01/2021    Cardiologist:  Balbir Beaver, last visit 2021    Under care of team 2021    PCP: juventino Bernal, last visit 2021    Under care of team 2021    Neurology: Melania Baker, last visit 2021    Under care of team 2021    Urology: Jose Antonio Rushing, last visit 10/2021    Wears glasses          Family History:       Problem Relation Age of Onset    Cancer Mother     No Known Problems Father     Cancer Sister     Peripheral Vascular Disease Sister        SURGICAL HISTORY:   Past Surgical History:   Procedure Laterality Date    ABLATION OF DYSRHYTHMIC FOCUS  2020    Dr. Joseph    CARDIAC CATHETERIZATION  2021    R-L     CARDIOVERSION  2016    failed    CARDIOVERSION  2019     SECTION      COLONOSCOPY      ENDOSCOPY, COLON, DIAGNOSTIC      EYE SURGERY      Cataracts removed, bilaterally    MITRAL VALVE REPAIR N/A 2021    MITRAL VALVE REPLACE, MAZE, CLARIBEL performed by Bill Acuna MD at [facility]    SKIN BIOPSY      TRANSESOPHAGEAL ECHOCARDIOGRAM  10/05/2021    severe mitral regurg    WISDOM TOOTH EXTRACTION      WRIST GANGLION EXCISION Right            SOCIAL AND OCCUPATIONAL HEALTH:      There is no history of TB or TB exposure.    There is no asbestos or silica dust exposure.    The patient reports is no coal, foundry, quarry or cotton mill exposure.    Travel history reveals no.    There is no history of recreational or IV drug use.   There is no hot tube exposure.   Pets none no significant occupational history    Occupational history not significant    TOBACCO:   reports that she quit smoking about 36 years ago. Her smoking use included cigarettes. She has a 3.75 pack-year smoking history. She has never been exposed to tobacco smoke. She has never used smokeless tobacco.  ETOH:   reports that she does not currently use alcohol.      ALLERGIES:      No Known Allergies      Home Meds:   Prior to Admission medications    Medication Sig Start Date End Date  Taking? Authorizing Provider   modafinil (PROVIGIL) 200 MG tablet TAKE 1 TABLET BY MOUTH DAILY IN  THE MORNING 3/16/23 6/14/23 Yes Britt Gonzalez MD   modafinil (PROVIGIL) 100 MG tablet Take 1 tablet by mouth daily for 30 days.  Max Daily Amount: 100 mg 3/16/23 4/15/23 Yes Britt Gonzalez MD   enoxaparin (LOVENOX) 80 MG/0.8ML  12/22/22  Yes Historical Provider, MD   rOPINIRole (REQUIP) 2 MG tablet Husam 1 po q 12 noon  2 po q 6PM and 2 po qhs 3/8/23  Yes Reilly Brown MD   gabapentin (NEURONTIN) 300 MG capsule TAKE ONE CAPSULE BY MOUTH TWICE A DAY AT NOON AND 6PM. 2/27/23 5/27/23 Yes Reilly Brown MD   warfarin (COUMADIN) 2.5 MG tablet  11/16/22  Yes Historical Provider, MD   clonazePAM (KLONOPIN) 1 MG tablet TAKE 1 TABLET BY MOUTH  EVERY NIGHT AT BEDTIME 11/17/22 3/17/23 Yes Reilly Brown MD   albuterol sulfate HFA (VENTOLIN HFA) 108 (90 Base) MCG/ACT inhaler Inhale 2 puffs into the lungs 4 times daily as needed for Wheezing 8/5/22  Yes Britt Gonzalez MD   Baclofen 20 MG/20ML SOLN 1 tablet with food or milk   Yes Historical Provider, MD   potassium chloride (KLOR-CON M) 20 MEQ extended release tablet 2 tablet   Yes Historical Provider, MD   desvenlafaxine succinate (PRISTIQ) 100 MG TB24 extended release tablet 1 tablet   Yes Historical Provider, MD   levothyroxine (SYNTHROID) 25 MCG tablet Take 25 mcg by mouth daily   Yes Historical Provider, MD   spironolactone (ALDACTONE) 25 MG tablet Take 25 mg by mouth daily 3/5/22  Yes Historical Provider, MD   aspirin 81 MG EC tablet Take 1 tablet by mouth daily 12/14/21  Yes SARAH Delarosa CNP   metoprolol tartrate (LOPRESSOR) 25 MG tablet Take 0.5 tablets by mouth 2 times daily 12/14/21  Yes SARAH Delarosa CNP   furosemide (LASIX) 20 MG tablet Take 1 tablet by mouth daily  Patient taking differently: Take 40 mg by mouth daily 12/14/21  Yes Lorin Nixon APRN - CNP   pantoprazole (PROTONIX) 40 MG tablet Take 1 tablet by mouth daily 12/14/21  Yes Lorin Nixon, APRN - CNP   alendronate (FOSAMAX) 70 MG tablet Take 70 mg by mouth every 7 days Takes on Tuesday. Yes Historical Provider, MD   Calcium Carb-Cholecalciferol 600-800 MG-UNIT TABS Take 1 tablet by mouth 2 times daily    Yes Historical Provider, MD   baclofen (LIORESAL) 20 MG tablet Take 20 mg by mouth nightly    Yes Historical Provider, MD   desvenlafaxine succinate (PRISTIQ) 100 MG TB24 Take 100 mg by mouth daily   Yes Historical Provider, MD   gabapentin (NEURONTIN) 800 MG tablet Take 1 po qhs  Patient taking differently: 600 mg. Take 1 po qhs 6/30/22 3/8/23  Timi Ward MD   rOPINIRole (REQUIP) 1 MG tablet TAKE 1 TABLET BY MOUTH UPON RISING 3 TABLETS BY MOUTH  AT 6 PM AND 3 TABLETS BY  MOUTH AT BEDTIME  Patient not taking: Reported on 3/17/2023 4/4/22   Timi Ward MD              REVIEW OF SYSTEMS: Review the system was conducted for all other 10 system no additional information was obtained other than what is noted above.     CONSTITUTIONAL:  negative for  fevers, chills, sweats, fatigue, malaise, anorexia and weight loss  EYES:  negative for  double vision, blurred vision, dry eyes, eye discharge and redness  HEENT:  negative for  hearing loss, tinnitus, ear drainage, earaches and nasal congestion  RESPIRATORY:  See hpi  CARDIOVASCULAR:  negative for  chest pain,, palpitations, orthopnea, PND  GASTROINTESTINAL:  negative for nausea, vomiting, change in bowel habits, diarrhea, constipation, abdominal pain, pruritus, abdominal mass and abdominal distention  GENITOURINARY:  negative for frequency, dysuria, nocturia, urinary incontinence and hesitancy  INTEGUMENT  negative for rash, skin lesion(s), dryness, skin color change, changes in lesion, pruritus and changes in hair  HEMATOLOGIC/LYMPHATIC:  negative for easy bruising, bleeding, lymphadenopathy, petechiae and swelling/edema  ALLERGIC/IMMUNOLOGIC:  negative for recurrent infections, urticaria and drug reactions  ENDOCRINE:  negative for heat intolerance, cold intolerance, tremor, weight changes and change in bowel habits  MUSCULOSKELETAL:  negative for  myalgias, arthralgias, pain, joint swelling, stiff joints and decreased range of motion  NEUROLOGICAL:  negative for headaches, dizziness, seizures, memory problems, speech problems, visual disturbance and coordination problems  BEHAVIOR/PSYCH:  negative for poor appetite, increased appetite, decreased sleep, increased sleep, decreased energy level, increased energy level and poor concentration  Skin no rash no dermatitis  Vitals:  /72 (Site: Left Upper Arm, Position: Sitting, Cuff Size: Medium Adult)   Pulse 68   Resp 16   Ht 5' 4\" (1.626 m)   Wt 176 lb (79.8 kg)   SpO2 97%   BMI 30.21 kg/m²     PHYSICAL EXAM:  General Appearance:    Alert, cooperative, no distress, appears stated age] no respiratory distress not using accessory muscles   Head:    Normocephalic, without obvious abnormality, atraumatic      Eyes:    PERRL, conjunctiva/corneas clear, EOM's intact no jaundice no Jose Angel syndrome   Ears:    Normal  external ear canals, both ears   Nose:   Nares normal, septum midline, mucosa normal, no drainage        or sinus tenderness no nasal polyps   Throat:   Lips, mucosa, and tongue normal; teeth and gums normal normal throat examination oropharyngeal orifice not compromised   Neck:   Supple, symmetrical, trachea midline, no adenopathy;     thyroid:  no enlargement/tenderness/nodules; no carotid    bruit , JVD not elevated hepatojugular reflux negative neck is not short of fat   Back:     Symmetric, no curvature, ROM normal, no CVA tenderness   Lungs:   AP diameter is not increased percussion note is normally resonant except slight decreased at the left base breathing vesicular no rales rhonchi are audible expiration not prolonged no pleural friction rub is audible   Chest Wall:    No tenderness or deformity      Heart:    Regular rate and rhythm, S1 and S2 normal, no murmur, rub        or gallop no rvh grade 3 wave 6 systolic murmur is heard in the left parasternal area is also present S4                          Abdomen:                                                 Pulses:                              Skin:                  Lymph nodes:                    Neurologic:                  Soft, non-tender, bowel sounds active all four quadrants,     no masses, no organomegaly         2+ and symmetric all extremities     Skin color, texture, turgor normal, no rashes or lesions       Cervical, supraclavicular not enlarged or matted or tender      CNII-XII intact, normal strength 5/5 . Sensation grossly normal  and reflexes normal 2+  throughout     Clubbing No  Lower ext edema bilateral pedal edema left more than right. Pitting in nature  Upper ext edema no edema         Musculoskeletal no synovitis. No joint swelling or tenderness        CBC: No results for input(s): WBC, HGB, PLT in the last 72 hours. BMP:  No results for input(s): NA, K, CL, CO2, BUN, CREATININE, GLUCOSE in the last 72 hours. Hepatic: No results for input(s): AST, ALT, ALB, BILITOT, ALKPHOS in the last 72 hours. Amylase: No results found for: AMYLASE  Lipase: No results found for: LIPASE  Troponin: No results for input(s): TROPONINI in the last 72 hours. BNP: No results for input(s): BNP in the last 72 hours. Lipids: No results for input(s): CHOL, HDL in the last 72 hours. Invalid input(s): LDLCALCU  ABGs: No results found for: PHART, PO2ART, [id number]  INR: No results for input(s): INR in the last 72 hours. Thyroid: No results found for: T4, TSH  Urinalysis: No results for input(s): BACTERIA, BLOODU, CLARITYU, COLORU, PHUR, PROTEINU, RBCUA, SPECGRAV, BILIRUBINUR, NITRU, WBCUA, LEUKOCYTESUR, GLUCOSEU in the last 72 hours.       Cultures:-  No blood cultures    CXR  Show cardiomegaly possible pulmonary congestion      CT Scans  As noted above patient does have a nodule about 7 mm in size in the right upper lobe region which is is calcified and very likely is a granuloma    Echo    No recent echo reports are available            IMPRESSION:        Effort dyspnea  : Recurrent heart failure            History of mitral valve replacement surgery  Pacemaker placement  History of CVA treated with endovascular surgery and procedure  Atrial fibrillation on anticoagulation  Insomnia  PLAN: Her major sleep problems may be related to poor sleep hygiene. I discussed with him and the patient that she must maintain regular sleep-wake schedule and stick to that. She was stopped taking naps during the daytime. And we discussed about d body activities before and during the sleep. She must wake up at l fixed time in the morning    I decreased the dose of Provigil 200 mg twice a day. He must make every effort not to take a nap during the daytime. Atrial fibrillation is rate controlled she will continue the anticoagulation. She is complaining of restless leg syndrome. We will treat with appropriate medication at this time she is on Klonopin, and Requip. We will try to wean her off those medication in coordination with neurology. Is possible the drug may in fact be inducing some of the symptoms of sleep she has. She had a CVA and there is no weakness or present. Her cardiovascular status is stable. She did have mitral valve replacement surgery and will continue the anticoagulation.     I will see her follow-up in few weeks dictated by Dr. Elisa Gore

## 2023-03-28 RX ORDER — GABAPENTIN 300 MG/1
CAPSULE ORAL
Qty: 60 CAPSULE | Refills: 2 | Status: SHIPPED | OUTPATIENT
Start: 2023-03-28 | End: 2023-09-28

## 2023-03-28 NOTE — TELEPHONE ENCOUNTER
Pharmacy requesting refill of gabapentin 300 mg.       Medication active on med list yes      Date of last Rx: 2/27/2023 with 0 refills          verified by SB, RMA      Date of last appointment 3/8/2023    Next Visit Date:  Visit date not found

## 2023-04-20 ENCOUNTER — OFFICE VISIT (OUTPATIENT)
Dept: PULMONOLOGY | Age: 63
End: 2023-04-20

## 2023-04-20 VITALS
OXYGEN SATURATION: 97 % | HEIGHT: 64 IN | WEIGHT: 174 LBS | HEART RATE: 71 BPM | RESPIRATION RATE: 16 BRPM | SYSTOLIC BLOOD PRESSURE: 91 MMHG | DIASTOLIC BLOOD PRESSURE: 54 MMHG | BODY MASS INDEX: 29.71 KG/M2

## 2023-04-20 DIAGNOSIS — I48.91 ATRIAL FIBRILLATION STATUS POST CARDIOVERSION (HCC): ICD-10-CM

## 2023-04-20 DIAGNOSIS — G47.10 HYPERSOMNIA: Primary | ICD-10-CM

## 2023-04-20 DIAGNOSIS — Z95.2 S/P MVR (MITRAL VALVE REPLACEMENT): Chronic | ICD-10-CM

## 2023-04-20 RX ORDER — MODAFINIL 200 MG/1
100 TABLET ORAL DAILY
Qty: 30 TABLET | Refills: 0 | Status: SHIPPED | OUTPATIENT
Start: 2023-04-20 | End: 2023-05-20

## 2023-04-20 NOTE — PROGRESS NOTES
AFLURIA, (age 1 y+), PF, 0.5mL 12/14/2021        Pneumococcal Vaccine     [x] Up to date    [] Indicated   [] Refused  [] Contraindicated       Influenza Vaccine   [x] Up to date    [] Indicated   [] Refused  [] Contraindicated          PAST MEDICAL HISTORY:       Diagnosis Date    Arm pain Right and Left    Atrial fibrillation (HCC)     Atrial fibrillation status post cardioversion [facility])     Atrial flutter ([address]     Cancer ([address]     skin    Carotid artery stenosis     Cerebral artery occlusion with cerebral infarction ([address]     Chest pain 11/2003    CHF (congestive heart failure) ([address]     Chipped tooth 12/01/2021    Top left    COPD (chronic obstructive pulmonary disease) ([address]     Depression     Former tobacco use     H/O: CVA (cerebrovascular accident)     1-    Heart palpitations     History of lateral epicondylitis of right elbow     And left elbow    Hx of blood clots     Hyperlipidemia     Hyperparathyroidism ([address]     Hypertensive chronic kidney disease with stage 1 through stage 4 chronic kidney disease, or unspecified chronic kidney disease     Long term current use of anticoagulant     Mitral regurgitation     Mitral valve insufficiency     Mitral valve regurgitation     Muscle spasm     Narcolepsy 10/2022    Nerve pain     Osteoporosis     Personal history of other medical treatment     Chronic diastolic congestive heart failure    Radial tunnel syndrome Right radial tunnel release     Restless legs     Sleep difficulties     Was diagnosed with Narcolepsy in December 2022.     SOB (shortness of breath) 11/08/2021    Tennis elbow Right and left    Under care of team 12/01/2021    Cardiologist: Becky Luong, last visit 8/2021    Under care of team 12/01/2021    PCP: Dr. Narinder Brown [place], last visit 6/2021    Under care of team 12/01/2021    Neurology: Dr. Harini Bill, last visit 11/2021    Under care of team 12/01/2021    Urology: Dr. Vale Diaz, last visit 10/2021    Wears glasses

## 2023-05-20 DIAGNOSIS — G25.81 RESTLESS LEGS SYNDROME: ICD-10-CM

## 2023-05-24 ENCOUNTER — OFFICE VISIT (OUTPATIENT)
Dept: NEUROLOGY | Age: 63
End: 2023-05-24

## 2023-05-24 VITALS
WEIGHT: 174 LBS | SYSTOLIC BLOOD PRESSURE: 93 MMHG | HEART RATE: 64 BPM | DIASTOLIC BLOOD PRESSURE: 56 MMHG | HEIGHT: 64 IN | BODY MASS INDEX: 29.71 KG/M2

## 2023-05-24 DIAGNOSIS — I48.91 ATRIAL FIBRILLATION, UNSPECIFIED TYPE (HCC): ICD-10-CM

## 2023-05-24 DIAGNOSIS — G25.81 RESTLESS LEGS SYNDROME: Primary | ICD-10-CM

## 2023-05-24 DIAGNOSIS — I63.132 CEREBRAL INFARCTION DUE TO EMBOLISM OF LEFT CAROTID ARTERY (HCC): ICD-10-CM

## 2023-05-24 RX ORDER — FERROUS SULFATE 325(65) MG
325 TABLET ORAL
COMMUNITY

## 2023-05-24 RX ORDER — CLONAZEPAM 1 MG/1
1 TABLET ORAL NIGHTLY
Qty: 90 TABLET | Refills: 0 | Status: SHIPPED | OUTPATIENT
Start: 2023-05-24 | End: 2023-11-28

## 2023-05-24 RX ORDER — CLONAZEPAM 1 MG/1
TABLET ORAL
Qty: 90 TABLET | OUTPATIENT
Start: 2023-05-24

## 2023-05-24 RX ORDER — GABAPENTIN 600 MG/1
TABLET ORAL
Qty: 90 TABLET | Refills: 1 | Status: SHIPPED | OUTPATIENT
Start: 2023-05-24 | End: 2023-11-24

## 2023-05-24 ASSESSMENT — ENCOUNTER SYMPTOMS
GASTROINTESTINAL NEGATIVE: 1
RESPIRATORY NEGATIVE: 1
ALLERGIC/IMMUNOLOGIC NEGATIVE: 1
EYES NEGATIVE: 1

## 2023-05-24 NOTE — PROGRESS NOTES
Subjective:      Patient ID: Deanna Goldberg is a 57 y.o. female. HPI  Active problem restless legs syndrome on neurontin and klonopin readjusting requip . Left hemispheric infarction with prior aphasia along with right hemiparesis resolved with TPA and left ICA terminus thrombus retrieval with atrial fibrillation, January 2016. Narcolepsy followed by Dr Gali Estevez . The condition is she reports that requip medication change has not helped . She is on requip to 2 mg po q 6 PM and 4 mg po qhs with feeling that requip earlier in day at noon was making her sleepy . Restless legs will begin any time she sits down worse in evening . She reports that noon tablet was of some effect in attenuating restless legs . She reports that current regimen with attenuate restless legs by 50% . She is going to bed at 12 AM taking one hour to fall asleep within few minutes sleeping to 8 AM overall sleeping straight through . There will be restless legs at night as she falling asleep. She had sleep study in August through Dr Gali Estevez showing no sleep apnea with apnea hypopnea index 0.1 episodes per hour with severe periodic leg movements with PLM index 53 episodes per hour . MSLT with severe daytime sleepiness with mean sleep latency 4 minutes with no REM inset intrusion. Study was done for daytime sleepiness with concern she has narcolepsy placed on provigil on 100 mg po q noon with some effect although  reports residual sleepiness . She does not feel this medication agravates restless legs. She has had mitral valve replacement along with cardiac pacemaker placement in December having some dyspnea on exertion . There is atrial fibrillation on coumadin . She remains on lipitor 10 mg po qd. There is normal language with no weakness ,numbness , bulbar or visual complaint  . There are no more transient head pains .  Significant medication coumadin ,  lipitor 10 mg po qd, requip  2 mg po q 6 PM and 4 mg po qhs , neurontin 800 mg po qhs,

## 2023-06-01 ENCOUNTER — OFFICE VISIT (OUTPATIENT)
Dept: PULMONOLOGY | Age: 63
End: 2023-06-01
Payer: MEDICARE

## 2023-06-01 VITALS
BODY MASS INDEX: 29.53 KG/M2 | DIASTOLIC BLOOD PRESSURE: 64 MMHG | RESPIRATION RATE: 16 BRPM | SYSTOLIC BLOOD PRESSURE: 89 MMHG | OXYGEN SATURATION: 100 % | HEART RATE: 63 BPM | WEIGHT: 173 LBS | HEIGHT: 64 IN

## 2023-06-01 DIAGNOSIS — G47.10 HYPERSOMNIA: Primary | ICD-10-CM

## 2023-06-01 PROCEDURE — 1036F TOBACCO NON-USER: CPT | Performed by: INTERNAL MEDICINE

## 2023-06-01 PROCEDURE — 99214 OFFICE O/P EST MOD 30 MIN: CPT | Performed by: INTERNAL MEDICINE

## 2023-06-01 PROCEDURE — G8417 CALC BMI ABV UP PARAM F/U: HCPCS | Performed by: INTERNAL MEDICINE

## 2023-06-01 PROCEDURE — G8427 DOCREV CUR MEDS BY ELIG CLIN: HCPCS | Performed by: INTERNAL MEDICINE

## 2023-06-01 PROCEDURE — 3017F COLORECTAL CA SCREEN DOC REV: CPT | Performed by: INTERNAL MEDICINE

## 2023-06-01 ASSESSMENT — SLEEP AND FATIGUE QUESTIONNAIRES
HOW LIKELY ARE YOU TO NOD OFF OR FALL ASLEEP WHILE LYING DOWN TO REST IN THE AFTERNOON WHEN CIRCUMSTANCES PERMIT: 3
HOW LIKELY ARE YOU TO NOD OFF OR FALL ASLEEP WHILE SITTING QUIETLY AFTER LUNCH WITHOUT ALCOHOL: 2
HOW LIKELY ARE YOU TO NOD OFF OR FALL ASLEEP WHEN YOU ARE A PASSENGER IN A CAR FOR AN HOUR WITHOUT A BREAK: 3
HOW LIKELY ARE YOU TO NOD OFF OR FALL ASLEEP WHILE SITTING INACTIVE IN A PUBLIC PLACE: 3
ESS TOTAL SCORE: 18
HOW LIKELY ARE YOU TO NOD OFF OR FALL ASLEEP IN A CAR, WHILE STOPPED FOR A FEW MINUTES IN TRAFFIC: 0
HOW LIKELY ARE YOU TO NOD OFF OR FALL ASLEEP WHILE SITTING AND TALKING TO SOMEONE: 2
HOW LIKELY ARE YOU TO NOD OFF OR FALL ASLEEP WHILE SITTING AND READING: 3
HOW LIKELY ARE YOU TO NOD OFF OR FALL ASLEEP WHILE WATCHING TV: 2

## 2023-06-01 NOTE — PROGRESS NOTES
friction rub is audible   Chest Wall:    No tenderness or deformity      Heart:    Regular rate and rhythm, S1 and S2 normal, no murmur, rub        or gallop no rvh grade 3 wave 6 systolic murmur is heard in the left parasternal area is also present S4                          Abdomen:                                                 Pulses:                              Skin:                  Lymph nodes:                    Neurologic:                  Soft, non-tender, bowel sounds active all four quadrants,     no masses, no organomegaly         2+ and symmetric all extremities     Skin color, texture, turgor normal, no rashes or lesions       Cervical, supraclavicular not enlarged or matted or tender      CNII-XII intact, normal strength 5/5 . Sensation grossly normal  and reflexes normal 2+  throughout     Clubbing No  Lower ext edema bilateral pedal edema left more than right. Pitting in nature  Upper ext edema no edema         Musculoskeletal no synovitis. No joint swelling or tenderness        CBC: No results for input(s): WBC, HGB, PLT in the last 72 hours. BMP:  No results for input(s): NA, K, CL, CO2, BUN, CREATININE, GLUCOSE in the last 72 hours. Hepatic: No results for input(s): AST, ALT, ALB, BILITOT, ALKPHOS in the last 72 hours. Amylase: No results found for: AMYLASE  Lipase: No results found for: LIPASE  Troponin: No results for input(s): TROPONINI in the last 72 hours. BNP: No results for input(s): BNP in the last 72 hours. Lipids: No results for input(s): CHOL, HDL in the last 72 hours. Invalid input(s): LDLCALCU  ABGs: No results found for: PHART, PO2ART, QCR2ILX  INR: No results for input(s): INR in the last 72 hours. Thyroid: No results found for: T4, TSH  Urinalysis: No results for input(s): BACTERIA, BLOODU, CLARITYU, COLORU, PHUR, PROTEINU, RBCUA, SPECGRAV, BILIRUBINUR, NITRU, WBCUA, LEUKOCYTESUR, GLUCOSEU in the last 72 hours.       Cultures:-  No blood cultures    CXR  Show

## 2023-06-29 RX ORDER — GABAPENTIN 300 MG/1
CAPSULE ORAL
Qty: 60 CAPSULE | OUTPATIENT
Start: 2023-06-29

## 2023-06-30 ENCOUNTER — OFFICE VISIT (OUTPATIENT)
Dept: PULMONOLOGY | Age: 63
End: 2023-06-30
Payer: MEDICARE

## 2023-06-30 VITALS
DIASTOLIC BLOOD PRESSURE: 70 MMHG | HEIGHT: 64 IN | HEART RATE: 79 BPM | SYSTOLIC BLOOD PRESSURE: 110 MMHG | WEIGHT: 172.4 LBS | RESPIRATION RATE: 15 BRPM | OXYGEN SATURATION: 100 % | BODY MASS INDEX: 29.43 KG/M2

## 2023-06-30 DIAGNOSIS — G47.10 HYPERSOMNIA: Primary | ICD-10-CM

## 2023-06-30 DIAGNOSIS — I48.91 ATRIAL FIBRILLATION STATUS POST CARDIOVERSION (HCC): ICD-10-CM

## 2023-06-30 PROCEDURE — 3017F COLORECTAL CA SCREEN DOC REV: CPT | Performed by: INTERNAL MEDICINE

## 2023-06-30 PROCEDURE — G8427 DOCREV CUR MEDS BY ELIG CLIN: HCPCS | Performed by: INTERNAL MEDICINE

## 2023-06-30 PROCEDURE — 1123F ACP DISCUSS/DSCN MKR DOCD: CPT | Performed by: INTERNAL MEDICINE

## 2023-06-30 PROCEDURE — 99214 OFFICE O/P EST MOD 30 MIN: CPT | Performed by: INTERNAL MEDICINE

## 2023-06-30 PROCEDURE — 1090F PRES/ABSN URINE INCON ASSESS: CPT | Performed by: INTERNAL MEDICINE

## 2023-06-30 PROCEDURE — G8400 PT W/DXA NO RESULTS DOC: HCPCS | Performed by: INTERNAL MEDICINE

## 2023-06-30 PROCEDURE — G8417 CALC BMI ABV UP PARAM F/U: HCPCS | Performed by: INTERNAL MEDICINE

## 2023-06-30 PROCEDURE — 1036F TOBACCO NON-USER: CPT | Performed by: INTERNAL MEDICINE

## 2023-06-30 ASSESSMENT — SLEEP AND FATIGUE QUESTIONNAIRES
HOW LIKELY ARE YOU TO NOD OFF OR FALL ASLEEP IN A CAR, WHILE STOPPED FOR A FEW MINUTES IN TRAFFIC: 0
ESS TOTAL SCORE: 20
HOW LIKELY ARE YOU TO NOD OFF OR FALL ASLEEP WHILE SITTING AND READING: 3
HOW LIKELY ARE YOU TO NOD OFF OR FALL ASLEEP WHILE SITTING AND TALKING TO SOMEONE: 2
HOW LIKELY ARE YOU TO NOD OFF OR FALL ASLEEP WHILE WATCHING TV: 3
HOW LIKELY ARE YOU TO NOD OFF OR FALL ASLEEP WHILE SITTING QUIETLY AFTER LUNCH WITHOUT ALCOHOL: 3
HOW LIKELY ARE YOU TO NOD OFF OR FALL ASLEEP WHILE LYING DOWN TO REST IN THE AFTERNOON WHEN CIRCUMSTANCES PERMIT: 3
HOW LIKELY ARE YOU TO NOD OFF OR FALL ASLEEP WHEN YOU ARE A PASSENGER IN A CAR FOR AN HOUR WITHOUT A BREAK: 3
HOW LIKELY ARE YOU TO NOD OFF OR FALL ASLEEP WHILE SITTING INACTIVE IN A PUBLIC PLACE: 3

## 2023-07-31 ENCOUNTER — TELEPHONE (OUTPATIENT)
Dept: PULMONOLOGY | Age: 63
End: 2023-07-31

## 2023-07-31 DIAGNOSIS — I63.232 ACUTE ISCHEMIC LEFT INTERNAL CAROTID ARTERY (ICA) STROKE (HCC): Primary | ICD-10-CM

## 2023-07-31 NOTE — TELEPHONE ENCOUNTER
Patient called stating she was supposed to get a referral for new neurologist.  [place] is not seeing any notes of referral order or new neurologist.  Please advise. Thank you.

## 2023-08-09 NOTE — TELEPHONE ENCOUNTER
Referral for Dr Eden Foss placed in patient's chart. Please review and sign if accepted.   Thank you

## 2023-08-12 DIAGNOSIS — G47.10 HYPERSOMNIA: Primary | ICD-10-CM

## 2023-08-14 NOTE — TELEPHONE ENCOUNTER
LAST VISIT: 6/30/23  NEXT VISIT: 10/6/23    Per last dictation patient is on this medication. Please sign for refill if ok. Thank you.

## 2023-08-15 RX ORDER — MODAFINIL 200 MG/1
TABLET ORAL
Qty: 15 TABLET | Refills: 2 | Status: SHIPPED | OUTPATIENT
Start: 2023-08-15 | End: 2023-11-15

## 2023-09-19 RX ORDER — ROPINIROLE 2 MG/1
TABLET, FILM COATED ORAL
Qty: 270 TABLET | Refills: 0 | Status: SHIPPED | OUTPATIENT
Start: 2023-09-19

## 2023-09-19 NOTE — TELEPHONE ENCOUNTER
Pharmacy requesting refill of ropinirole 2 mg.       Medication active on med list yes      Date of last Rx: 3/8/2023 with 1 refills          verified by MENDY SCHWARZA      Date of last appointment 5/24/2023    Next Visit Date:  Visit date not found

## 2023-10-06 ENCOUNTER — OFFICE VISIT (OUTPATIENT)
Dept: PULMONOLOGY | Age: 63
End: 2023-10-06
Payer: MEDICARE

## 2023-10-06 VITALS
OXYGEN SATURATION: 100 % | HEART RATE: 72 BPM | WEIGHT: 172 LBS | DIASTOLIC BLOOD PRESSURE: 75 MMHG | BODY MASS INDEX: 29.37 KG/M2 | RESPIRATION RATE: 14 BRPM | HEIGHT: 64 IN | SYSTOLIC BLOOD PRESSURE: 124 MMHG

## 2023-10-06 DIAGNOSIS — G47.10 HYPERSOMNIA: Primary | ICD-10-CM

## 2023-10-06 DIAGNOSIS — Z95.2 S/P MVR (MITRAL VALVE REPLACEMENT): ICD-10-CM

## 2023-10-06 DIAGNOSIS — I48.91 ATRIAL FIBRILLATION STATUS POST CARDIOVERSION (HCC): ICD-10-CM

## 2023-10-06 PROCEDURE — 1123F ACP DISCUSS/DSCN MKR DOCD: CPT | Performed by: INTERNAL MEDICINE

## 2023-10-06 PROCEDURE — 1036F TOBACCO NON-USER: CPT | Performed by: INTERNAL MEDICINE

## 2023-10-06 PROCEDURE — G8417 CALC BMI ABV UP PARAM F/U: HCPCS | Performed by: INTERNAL MEDICINE

## 2023-10-06 PROCEDURE — G8484 FLU IMMUNIZE NO ADMIN: HCPCS | Performed by: INTERNAL MEDICINE

## 2023-10-06 PROCEDURE — 1090F PRES/ABSN URINE INCON ASSESS: CPT | Performed by: INTERNAL MEDICINE

## 2023-10-06 PROCEDURE — G8400 PT W/DXA NO RESULTS DOC: HCPCS | Performed by: INTERNAL MEDICINE

## 2023-10-06 PROCEDURE — G8427 DOCREV CUR MEDS BY ELIG CLIN: HCPCS | Performed by: INTERNAL MEDICINE

## 2023-10-06 PROCEDURE — 3017F COLORECTAL CA SCREEN DOC REV: CPT | Performed by: INTERNAL MEDICINE

## 2023-10-06 PROCEDURE — 99214 OFFICE O/P EST MOD 30 MIN: CPT | Performed by: INTERNAL MEDICINE

## 2023-10-06 NOTE — PROGRESS NOTES
Mother     No Known Problems Father     Cancer Sister     Peripheral Vascular Disease Sister        SURGICAL HISTORY:   Past Surgical History:   Procedure Laterality Date    ABLATION OF DYSRHYTHMIC FOCUS  2020    Dr. Gogo Castaneda  2021    R-L     CARDIOVERSION  2016    failed    CARDIOVERSION  2019     SECTION      COLONOSCOPY      ENDOSCOPY, COLON, DIAGNOSTIC      EYE SURGERY      Cataracts removed, bilaterally    MITRAL VALVE REPAIR N/A 2021    MITRAL VALVE REPLACE, MAZE, CLARIBEL performed by Claudia Marks MD at [facility] [place] [facility]\""      TRANSESOPHAGEAL ECHOCARDIOGRAM  10/05/2021    severe mitral regurg    WISDOM TOOTH EXTRACTION      WRIST GANGLION EXCISION Right            SOCIAL AND OCCUPATIONAL HEALTH:      There is no history of TB or TB exposure. There is no asbestos or silica dust exposure. The patient reports is no coal, foundry, quarry or [employer] exposure. Travel history reveals no. There is no history of recreational or IV drug use. There is no hot tube exposure. Pets none no significant occupational history    Occupational history not significant    TOBACCO:   reports that she quit smoking about 37 years ago. Her smoking use included cigarettes. She has a 3.75 pack-year smoking history. She has never been exposed to tobacco smoke. She has never used smokeless tobacco.  ETOH:   reports that she does not currently use alcohol. ALLERGIES:      No Known Allergies      Home Meds:   Prior to Admission medications    Medication Sig Start Date End Date Taking?  Authorizing Provider   rOPINIRole (REQUIP) 2 MG tablet TAKE 1 TABLET BY MOUTH AT 6 PM, AND 2  TABLETS AT BEDTIME 23  Yes Tai Erwin MD   modafinil (PROVIGIL) 200 MG tablet TAKE 1/2 TABLET BY MOUTH DAILY 8/15/23 11/15/23 Yes Marychuy Muse MD   ferrous sulfate (IRON 325) 325 (65 Fe) MG tablet Take 1 tablet by mouth daily (with breakfast) 1 TABLET 3 TIMES

## 2023-11-13 ENCOUNTER — TELEPHONE (OUTPATIENT)
Dept: NEUROLOGY | Age: 63
End: 2023-11-13

## 2023-11-13 NOTE — TELEPHONE ENCOUNTER
11 13 2023 I called the patient times 2 (11 04 2023 and 11 13 2023 at [phone]) to schedule new patient appointment with one of our providers, [facility] both times, no response. I mailed the patient a letter asking them to call the office back to schedule this appointment.   KS

## 2023-11-21 ENCOUNTER — TELEPHONE (OUTPATIENT)
Dept: PULMONOLOGY | Age: 63
End: 2023-11-21

## 2023-11-21 NOTE — TELEPHONE ENCOUNTER
Patient called stating the neurologist Dr. Mansoor Davis put in order for called her recently. She said her and Dr. Mansoor Davis decided at last appointment that she does not need to see a neurologist and he will prescribe her medications for RLS in future. She does not need them right now.

## 2023-12-14 RX ORDER — ROPINIROLE 2 MG/1
TABLET, FILM COATED ORAL
Qty: 270 TABLET | Refills: 0 | Status: SHIPPED | OUTPATIENT
Start: 2023-12-14

## 2023-12-14 NOTE — TELEPHONE ENCOUNTER
Pharmacy requesting refill of Ropinirole.       Medication active on med list: yes      Date of last fill: 9/19/23 for #270 NR  verified on 12/14/2023    verified by Javed Askew LPN      Date of last appointment: 5/24/23     Next Visit Date:  Visit date not found

## 2024-01-19 ENCOUNTER — OFFICE VISIT (OUTPATIENT)
Dept: PULMONOLOGY | Age: 64
End: 2024-01-19
Payer: MEDICARE

## 2024-01-19 VITALS
RESPIRATION RATE: 16 BRPM | SYSTOLIC BLOOD PRESSURE: 102 MMHG | OXYGEN SATURATION: 94 % | WEIGHT: 185 LBS | HEIGHT: 64 IN | HEART RATE: 77 BPM | BODY MASS INDEX: 31.58 KG/M2 | DIASTOLIC BLOOD PRESSURE: 68 MMHG

## 2024-01-19 DIAGNOSIS — I48.91 ATRIAL FIBRILLATION STATUS POST CARDIOVERSION (HCC): Primary | ICD-10-CM

## 2024-01-19 DIAGNOSIS — G81.91 RIGHT HEMIPARESIS (HCC): ICD-10-CM

## 2024-01-19 DIAGNOSIS — Z95.2 S/P MVR (MITRAL VALVE REPLACEMENT): ICD-10-CM

## 2024-01-19 PROCEDURE — 1036F TOBACCO NON-USER: CPT | Performed by: INTERNAL MEDICINE

## 2024-01-19 PROCEDURE — G8417 CALC BMI ABV UP PARAM F/U: HCPCS | Performed by: INTERNAL MEDICINE

## 2024-01-19 PROCEDURE — 1123F ACP DISCUSS/DSCN MKR DOCD: CPT | Performed by: INTERNAL MEDICINE

## 2024-01-19 PROCEDURE — G8400 PT W/DXA NO RESULTS DOC: HCPCS | Performed by: INTERNAL MEDICINE

## 2024-01-19 PROCEDURE — 99214 OFFICE O/P EST MOD 30 MIN: CPT | Performed by: INTERNAL MEDICINE

## 2024-01-19 PROCEDURE — G8427 DOCREV CUR MEDS BY ELIG CLIN: HCPCS | Performed by: INTERNAL MEDICINE

## 2024-01-19 PROCEDURE — 1090F PRES/ABSN URINE INCON ASSESS: CPT | Performed by: INTERNAL MEDICINE

## 2024-01-19 PROCEDURE — 3017F COLORECTAL CA SCREEN DOC REV: CPT | Performed by: INTERNAL MEDICINE

## 2024-01-19 PROCEDURE — G8484 FLU IMMUNIZE NO ADMIN: HCPCS | Performed by: INTERNAL MEDICINE

## 2024-01-19 RX ORDER — CLONAZEPAM 0.5 MG/1
0.5 TABLET ORAL 2 TIMES DAILY PRN
COMMUNITY

## 2024-01-19 NOTE — PROGRESS NOTES
REASON FOR THE CONSULTATION:  Shortness of breath  Mitral valve replacement surgery SP  Recurrent heart failure  Hypersomnia  Possible sleep apnea  History of CVA  Atrial fibrillation  Restless leg syndrome  HISTORY OF PRESENT ILLNESS:    Patient have had a psychophysiological insomnia which was complicated by too many different medications.  Fortunately we have been able to wean her off many medications and her nighttime sleep has significantly improved.  She is sleeping about 6 hours every night.  She is still waking up at night and looking at the clock.  I advised her not to look at the watch of the clock.  She is not tired fatigued sleepy during the daytime.  Does not take naps.  No morning headaches.    She have her mitral valve replacement surgery no evidence of any heart failure.  Have had CVA with minimal residual defect.  She has restless leg syndrome which is responding well to the present treatment.  He has atrial fibrillation which is rate controlled.  He is on anticoagulation.  He already have her COVID-vaccine, flu vaccine Pneumovax    LUNG CANCER SCREENING     CRITERIA MET    []     CT ORDERED  []      CRITERIA NOT MET   [x]      REFUSED                    []        REASON CRITERIA NOT MET     SMOKING LESS THAN 30 PY  []      AGE LESS THAN 55 or GREATER 77 YEARS  []      QUIT SMOKING 15 YEARS OR GREATER   []      RECENT CT WITH IN 11 MONTHS    []      LIFE EXPECTANCY < 5 YEARS   []      SIGNS  AND SYMPTOMS OF LUNG CANCER   []         Immunization   Immunization History   Administered Date(s) Administered    COVID-19, MODERNA BLUE border, Primary or Immunocompromised, (age 12y+), IM, 100 mcg/0.5mL 03/08/2021, 04/05/2021    Influenza, FLUARIX, FLULAVAL, FLUZONE (age 6 mo+) AND AFLURIA, (age 3 y+), PF, 0.5mL 12/14/2021        Pneumococcal Vaccine     [x] Up to date    [] Indicated   [] Refused  [] Contraindicated       Influenza Vaccine   [x] Up to date    [] Indicated   [] Refused  [] Contraindicated

## 2024-04-04 DIAGNOSIS — G25.81 RESTLESS LEGS SYNDROME: ICD-10-CM

## 2024-04-04 NOTE — TELEPHONE ENCOUNTER
Call placed to the patient and a message left on her VM asking for a return call regarding this Rx request.      Will ask patient to schedule a follow up when she calls back.  Her last visit was 5/24/23 and Dr. Ibarra wanted to see her back in 2 months.  Patient cancelled her 7/27/23 follow up and stated that she would call back to reschedule.  Writer also has questions about the medication itself.  Last Rx we gave was on 5/24/23 for Klonopin 1 mg #90 NR to take one tablet po q hs.  Patient would not have had enough to get through until now.  Writer also found that on her outpatient medication list Klonopin is listed as 0.5 mg taking one BID PRN

## 2024-04-09 RX ORDER — CLONAZEPAM 1 MG/1
1 TABLET ORAL NIGHTLY
Qty: 90 TABLET | OUTPATIENT
Start: 2024-04-09

## 2024-04-09 NOTE — TELEPHONE ENCOUNTER
Patient has not called the office back.  Will deny request and wait for patient to call the office.

## 2024-05-16 NOTE — TELEPHONE ENCOUNTER
Pharmacy requesting a  refill of Gabapentin 800mg.       Medication active on med list yes      Date of last prescription 06/28/2021  with 2 refills verified on 09/27/2021    verified by SUDHAKAR MAR      Date of last appointment 06/22/2021    Next Visit Date:  11/18/2021
[TextEntry] : I have spent 60 min of time for the encounter exclusive of any procedures performed during the visit.

## 2024-07-19 ENCOUNTER — OFFICE VISIT (OUTPATIENT)
Dept: PULMONOLOGY | Age: 64
End: 2024-07-19
Payer: MEDICARE

## 2024-07-19 VITALS
OXYGEN SATURATION: 100 % | DIASTOLIC BLOOD PRESSURE: 67 MMHG | BODY MASS INDEX: 30.9 KG/M2 | WEIGHT: 181 LBS | RESPIRATION RATE: 17 BRPM | SYSTOLIC BLOOD PRESSURE: 106 MMHG | HEIGHT: 64 IN | HEART RATE: 70 BPM

## 2024-07-19 DIAGNOSIS — F51.04 PSYCHOPHYSIOLOGICAL INSOMNIA: Primary | ICD-10-CM

## 2024-07-19 DIAGNOSIS — R91.1 LUNG NODULE: ICD-10-CM

## 2024-07-19 PROCEDURE — 1036F TOBACCO NON-USER: CPT | Performed by: INTERNAL MEDICINE

## 2024-07-19 PROCEDURE — 99214 OFFICE O/P EST MOD 30 MIN: CPT | Performed by: INTERNAL MEDICINE

## 2024-07-19 PROCEDURE — G8417 CALC BMI ABV UP PARAM F/U: HCPCS | Performed by: INTERNAL MEDICINE

## 2024-07-19 PROCEDURE — 3017F COLORECTAL CA SCREEN DOC REV: CPT | Performed by: INTERNAL MEDICINE

## 2024-07-19 PROCEDURE — 1090F PRES/ABSN URINE INCON ASSESS: CPT | Performed by: INTERNAL MEDICINE

## 2024-07-19 PROCEDURE — 1123F ACP DISCUSS/DSCN MKR DOCD: CPT | Performed by: INTERNAL MEDICINE

## 2024-07-19 PROCEDURE — G8400 PT W/DXA NO RESULTS DOC: HCPCS | Performed by: INTERNAL MEDICINE

## 2024-07-19 PROCEDURE — G8427 DOCREV CUR MEDS BY ELIG CLIN: HCPCS | Performed by: INTERNAL MEDICINE

## 2024-07-19 RX ORDER — ATORVASTATIN CALCIUM 10 MG/1
TABLET, FILM COATED ORAL
COMMUNITY
Start: 2024-07-01

## 2024-07-19 RX ORDER — CALCIUM CARBONATE 300MG(750)
TABLET,CHEWABLE ORAL
COMMUNITY
Start: 2024-06-29

## 2024-07-19 RX ORDER — AMOXICILLIN 250 MG
1 CAPSULE ORAL 2 TIMES DAILY
COMMUNITY
Start: 2024-03-13

## 2024-07-19 RX ORDER — CETIRIZINE HYDROCHLORIDE 10 MG/1
10 TABLET, CHEWABLE ORAL DAILY
COMMUNITY

## 2024-07-19 RX ORDER — TIZANIDINE 4 MG/1
TABLET ORAL
COMMUNITY
Start: 2024-03-13

## 2024-07-19 RX ORDER — TRIAMCINOLONE ACETONIDE 1 MG/G
CREAM TOPICAL 2 TIMES DAILY
COMMUNITY
Start: 2023-12-01

## 2024-07-19 RX ORDER — CLONAZEPAM 0.5 MG/1
0.5 TABLET ORAL 2 TIMES DAILY
Qty: 60 TABLET | Refills: 0 | Status: SHIPPED | OUTPATIENT
Start: 2024-07-19 | End: 2024-08-18

## 2024-07-22 NOTE — PROGRESS NOTES
Declined CVA treated with endovascular surgery and procedure  Atrial fibrillation on anticoagulation  Insomnia  PLAN:  Patient is accompanied by her  .    Her insomnia has significantly improved.  She still complains of anxiety which does interfere with onset of sleep at times.  I advised her to take a Klonopin 0.5 mg at bedtime on as-needed basis.  She does not need to take it every night.  She will continue the anticoagulation because of the valve valve replacement surgery and atrial fibrillation.    Have had CVA in the past has been no recurrence.  No evidence  Of clinical evidence of heart failure.    Evaluation ordered.  Their questions were answered.  Will see her follow-up in few months.  She was given a prescription for Klonopin 0.5 mg to be used on as-needed basis    Dictated by Dr. Clark

## 2024-09-08 DIAGNOSIS — F51.04 PSYCHOPHYSIOLOGICAL INSOMNIA: ICD-10-CM

## 2024-09-10 RX ORDER — CLONAZEPAM 0.5 MG/1
0.5 TABLET ORAL 2 TIMES DAILY
Qty: 60 TABLET | Refills: 0 | Status: SHIPPED | OUTPATIENT
Start: 2024-09-10 | End: 2024-10-10

## 2024-11-11 DIAGNOSIS — F51.04 PSYCHOPHYSIOLOGICAL INSOMNIA: ICD-10-CM

## 2024-11-11 RX ORDER — CLONAZEPAM 0.5 MG/1
0.5 TABLET ORAL NIGHTLY PRN
Qty: 30 TABLET | Refills: 2 | Status: SHIPPED | OUTPATIENT
Start: 2024-11-11 | End: 2025-02-09

## 2024-11-11 NOTE — TELEPHONE ENCOUNTER
LAST VISIT: 7/19/24 with Dr Clark  NEXT VISIT: 1/24/25 with Dr Clark    Per Dr Clark's last office note patient is on this medication. Please sign for refill if ok. Thank you.

## 2025-01-24 ENCOUNTER — OFFICE VISIT (OUTPATIENT)
Dept: PULMONOLOGY | Age: 65
End: 2025-01-24
Payer: MEDICARE

## 2025-01-24 VITALS
OXYGEN SATURATION: 97 % | HEIGHT: 61 IN | RESPIRATION RATE: 18 BRPM | DIASTOLIC BLOOD PRESSURE: 60 MMHG | BODY MASS INDEX: 31.72 KG/M2 | HEART RATE: 74 BPM | WEIGHT: 168 LBS | SYSTOLIC BLOOD PRESSURE: 88 MMHG

## 2025-01-24 DIAGNOSIS — F51.04 PSYCHOPHYSIOLOGICAL INSOMNIA: Primary | ICD-10-CM

## 2025-01-24 DIAGNOSIS — Z95.2 S/P MVR (MITRAL VALVE REPLACEMENT): ICD-10-CM

## 2025-01-24 DIAGNOSIS — G47.10 HYPERSOMNIA: ICD-10-CM

## 2025-01-24 DIAGNOSIS — J44.9 STAGE 2 MODERATE COPD BY GOLD CLASSIFICATION (HCC): ICD-10-CM

## 2025-01-24 DIAGNOSIS — I50.22 CHRONIC SYSTOLIC (CONGESTIVE) HEART FAILURE (HCC): ICD-10-CM

## 2025-01-24 PROCEDURE — 99214 OFFICE O/P EST MOD 30 MIN: CPT | Performed by: NURSE PRACTITIONER

## 2025-01-24 PROCEDURE — 3017F COLORECTAL CA SCREEN DOC REV: CPT | Performed by: NURSE PRACTITIONER

## 2025-01-24 PROCEDURE — G8427 DOCREV CUR MEDS BY ELIG CLIN: HCPCS | Performed by: NURSE PRACTITIONER

## 2025-01-24 PROCEDURE — G8400 PT W/DXA NO RESULTS DOC: HCPCS | Performed by: NURSE PRACTITIONER

## 2025-01-24 PROCEDURE — 1159F MED LIST DOCD IN RCRD: CPT | Performed by: NURSE PRACTITIONER

## 2025-01-24 PROCEDURE — 1036F TOBACCO NON-USER: CPT | Performed by: NURSE PRACTITIONER

## 2025-01-24 PROCEDURE — 1123F ACP DISCUSS/DSCN MKR DOCD: CPT | Performed by: NURSE PRACTITIONER

## 2025-01-24 PROCEDURE — 1090F PRES/ABSN URINE INCON ASSESS: CPT | Performed by: NURSE PRACTITIONER

## 2025-01-24 PROCEDURE — 3023F SPIROM DOC REV: CPT | Performed by: NURSE PRACTITIONER

## 2025-01-24 PROCEDURE — G8417 CALC BMI ABV UP PARAM F/U: HCPCS | Performed by: NURSE PRACTITIONER

## 2025-01-24 RX ORDER — ALBUTEROL SULFATE 90 UG/1
2 INHALANT RESPIRATORY (INHALATION) 4 TIMES DAILY PRN
Qty: 3 EACH | Refills: 2 | Status: SHIPPED | OUTPATIENT
Start: 2025-01-24

## 2025-01-24 ASSESSMENT — ENCOUNTER SYMPTOMS
GASTROINTESTINAL NEGATIVE: 1
ALLERGIC/IMMUNOLOGIC NEGATIVE: 1
EYES NEGATIVE: 1

## 2025-01-24 NOTE — PROGRESS NOTES
PROCEDURE REASON: multiple diagnoses      * * * * Physician Interpretation * * * *  Stress Supervisor Report:  [facility] Office Date of service: 1/8/2025 10:29:37 AM Accession #: [id number] Supervising physician: Andrzej Day MD PATIENT: Name: JESSIKA LIM MRN: [id number] Age: 64 years Gender: F Electronically signed by Andrzej Day MD on 1/9/2025 at 2:46:30 PM  *  *  *  Final  *  *  * -------------------------------------------------- PATIENT: Name: JESSIKA LIM MRN: [id number] Age: 64 years Gender: F CONCLUSIONS:  1. SPECT Perfusion Study: Normal.  2. There is no scintigraphic evidence for inducible ischemia.  3. No evidence of scarred myocardium.  4. Left ventricle is small. The left ventricle systolic function is hyperdynamic.  5. Right ventricle is normal in size. The right ventricle systolic function is normal.  6. This is a low risk scan.         Gated Stress FBP  LVEF % 91 Prior Study Comparison No prior nuclear cardiology exam available for comparison. Nuclear Med Report:1-Day Gated SPECT Myocardial Perfusion with Regadenoson Stress: Myocardial perfusion imaging was performed at rest 30 minutes following the IV injection of the radiotracer. The patient received 0.4 mg of regadenoson, via rapid IV push, immediately followed by radiotracer IV. Gated post stress tomographic imaging was performed 30 to 60 minutes later. See administered radiotracer and doses below. [facility] Office Date of service: 1/8/2025 10:29:37 AM Accession #: [id number] Ordering Physician: LEXIE RIVERA. Requesting Physician: Indication: CP - ECG uniterpretable OR unable to exercise Interpreting physician: Earline Roman MD Height: 161.30 cm BSA: 1.88 m² Weight: 78.90 kg  BMI: 30.3 kg/m²    Exam Type:        Rest                            Stress   Radiopharm: Tc-99m Tetrofosmin               Tc-99m Tetrofosmin  Dosage(mCi):        14.3                             36.0 Stress

## 2025-07-10 NOTE — TELEPHONE ENCOUNTER
Last appointment: 1/24/25 (NP aJvy Chand)  Next appointment: 9/25/25  Last fill date: 1/24/25    Requested Prescriptions     Pending Prescriptions Disp Refills    albuterol sulfate HFA (PROVENTIL;VENTOLIN;PROAIR) 108 (90 Base) MCG/ACT inhaler [Pharmacy Med Name: ALBUTEROL HFA 90MCG/ACT (V)] 72 g 3     Sig: USE 2 INHALATIONS BY MOUTH 4  TIMES DAILY AS NEEDED FOR  WHEEZING       Per last dictation, patient is currently taking the pended medication(s). Please sign if agreeable. Thank you.

## 2025-07-17 RX ORDER — ALBUTEROL SULFATE 90 UG/1
INHALANT RESPIRATORY (INHALATION)
Qty: 72 G | Refills: 3 | Status: SHIPPED | OUTPATIENT
Start: 2025-07-17

## (undated) DEVICE — 3M™ IOBAN™ 2 ANTIMICROBIAL INCISE DRAPE 6651EZ: Brand: IOBAN™ 2

## (undated) DEVICE — CONNECTOR HAD 1/2X1/2IN EQL STR

## (undated) DEVICE — FOGARTY - HYDRAGRIP SURGICAL - CLAMP INSERTS: Brand: FOGARTY HYDRAJAW

## (undated) DEVICE — COVER,LIGHT HANDLE,FLX,2/PK: Brand: MEDLINE INDUSTRIES, INC.

## (undated) DEVICE — PLATELET CONCENTRATION PACK PROC 14-20 ML SMARTPREP 2

## (undated) DEVICE — GLOVE SURG SZ 75 CRM LTX FREE POLYISOPRENE POLYMER BEAD ANTI

## (undated) DEVICE — GOWN,AURORA,NONREINFORCED,LARGE: Brand: MEDLINE

## (undated) DEVICE — POSITIONER,HEAD,MULTIRING,36CS: Brand: MEDLINE

## (undated) DEVICE — SUTURE NONABSORBABLE MONOFILAMENT 5-0 C-1 1X24 IN PROLENE 8725H

## (undated) DEVICE — SUMP INTCARD W/ 1/4INCH CONN 20FRENCH 15INCH DLP

## (undated) DEVICE — 48" PROBE COVER W/GEL, ULTRASOUND, STERILE: Brand: SITE-RITE

## (undated) DEVICE — COR-KNOT® QUICK LOAD® SINGLES: Brand: COR-KNOT® QUICK LOAD®

## (undated) DEVICE — Device

## (undated) DEVICE — SUTURE VCRL + SZ 4-0 L18IN ABSRB UD L19MM PS-2 3/8 CIR PRIM VCP496H

## (undated) DEVICE — COUNTER NDL 40 COUNT HLD 70 FOAM BLK ADH W/ MAG

## (undated) DEVICE — Z DISCONTINUED NO SUB IDED DRAIN SURG 2 COLL PT TB FOR ATS BG OASIS

## (undated) DEVICE — INTENDED FOR TISSUE SEPARATION, AND OTHER PROCEDURES THAT REQUIRE A SHARP SURGICAL BLADE TO PUNCTURE OR CUT.: Brand: BARD-PARKER ® CARBON RIB-BACK BLADES

## (undated) DEVICE — BLADE SAW W6.35XL32MM STRNM CUT STRNOTMY

## (undated) DEVICE — KIT APPL 11:1 PROC W/ FIBRIJET MED CUP APPL TIP TY

## (undated) DEVICE — SUTURE NONABSORBABLE BRAIDED 2-0 SH-2 1X30 IN ETHBND EXCEL PXX80

## (undated) DEVICE — PACK PROCEDURE SURG OPN HRT

## (undated) DEVICE — CLIP LIG M BLU TI HRT SHP WIRE HORZ 180 PER BX

## (undated) DEVICE — PLEDGET SURG W3.5XL7MM THK1.5MM WHT PTFE RECT FIRM TFE

## (undated) DEVICE — ADHESIVE SKIN CLOSURE TOP 36 CC HI VISC DERMBND MINI

## (undated) DEVICE — GEL US 20GM NONIRRITATING OVERWRAPPED FILE PCH TRNSMIT

## (undated) DEVICE — PROBE CRYOABLATION L10CM ALUM SMOOTH MAL CRYOICE

## (undated) DEVICE — CONNECTOR TBNG WHT PLAS SUCT STR 5IN1 LTWT W/ M CONN

## (undated) DEVICE — Device: Brand: ZDRIVE™

## (undated) DEVICE — CLIP INT SM WIDE RED TI TRNSVRS GRV CHEVRON SHP W/ PRECIS

## (undated) DEVICE — Z INACTIVE OBSOLETE PER MEDTRONIC CUSTOM PK HY8C67R11 VAVD PK

## (undated) DEVICE — TTL1LYR 16FR10ML 100%SIL TMPST TR: Brand: MEDLINE

## (undated) DEVICE — TUBING, SUCTION, 9/32" X 20', STRAIGHT: Brand: MEDLINE INDUSTRIES, INC.

## (undated) DEVICE — AGENT HEMSTAT W2XL4IN OXIDIZED REGENERATED CELOS ABSRB SFT

## (undated) DEVICE — BRA COMPR LG NYL LYCRA SPANDEX WHT CURAD

## (undated) DEVICE — SUTURE ETHIB EXCL BR GRN TAPR PT 2-0 30 X563H X563H

## (undated) DEVICE — MEDI-VAC NON-CONDUCTIVE SUCTION TUBING 7MM X 6.1M (20 FT.) L: Brand: CARDINAL HEALTH

## (undated) DEVICE — CANNULA PERFUSION 5.5IN 9FR AORTIC ROOT

## (undated) DEVICE — TOWEL,OR,DSP,ST,BLUE,DLX,XR,4/PK,20PK/CS: Brand: MEDLINE

## (undated) DEVICE — LEAD PACE L475MM CHNL A OR V MYOCARDIAL STEROID ELUT SIL

## (undated) DEVICE — APPLICATOR MEDICATED 10.5 CC SOLUTION HI LT ORNG CHLORAPREP

## (undated) DEVICE — GLOVE SURG SZ 7.5 L11.73IN FNGR THK9.8MIL STRW LTX POLYMER

## (undated) DEVICE — TUBE HMOCHRN NONEVACUATED ACT ANALY

## (undated) DEVICE — GLOVE SURG SZ 8 CRM LTX FREE POLYISOPRENE POLYMER BEAD ANTI

## (undated) DEVICE — PREVENA INCISION MANAGEMENT SYSTEM- PEEL & PLACE DRESSING: Brand: PREVENA™ PEEL & PLACE™

## (undated) DEVICE — SUTURE PERMA-HAND SZ 0 L18IN NONABSORBABLE BLK CT-2 L26MM C027D

## (undated) DEVICE — WAX SURG 2.5GM HEMSTAT BNE BEESWAX PARAFFIN ISO PALMITATE

## (undated) DEVICE — Z DISCONTINUED BY MEDLINE USE 2711682 TRAY SKIN PREP DRY W/ PREM GLV

## (undated) DEVICE — CATHETER,URETHRAL,REDRUBBER,STRL,18FR: Brand: MEDLINE

## (undated) DEVICE — CANNULA PERF 18FR L12IN 1 PC ELONG HI FLO BVL SUT CLLR VENT

## (undated) DEVICE — SUTURE PROL SZ 4-0 L36IN NONABSORBABLE BLU L26MM SH 1/2 CIR 8521H

## (undated) DEVICE — 3M™ STERI-STRIP™ COMPOUND BENZOIN TINCTURE 40 BAGS/CARTON 4 CARTONS/CASE C1544: Brand: 3M™ STERI-STRIP™

## (undated) DEVICE — HEMOCONCENTERATOR PERF W TBNG AND ADPT SET 400 MEDIVATORS

## (undated) DEVICE — COR-KNOT MINI® COMBO KITBASE PACKAGE TYPE - KITEACH STERILE PACKAGE KIT CONTAINS (2) SINGLE PATIENT USE COR-KNOT MINI® DEVICES AND (12) COR-KNOT® QUICK LOADS®.: Brand: COR-KNOT MINI®

## (undated) DEVICE — GLOVE SURG SZ 7 CRM LTX FREE POLYISOPRENE POLYMER BEAD ANTI

## (undated) DEVICE — DRAPE SLUSH DISC W44XL66IN ST FOR RND BSIN HUSH SLUSH SYS

## (undated) DEVICE — NEPTUNE E-SEP SMOKE EVACUATION PENCIL, COATED, 70MM BLADE, PUSH BUTTON SWITCH: Brand: NEPTUNE E-SEP

## (undated) DEVICE — SUTURE VCRL + SZ 2-0 L27IN ABSRB CLR CT-1 1/2 CIR TAPERCUT VCP259H

## (undated) DEVICE — GOWN,SIRUS,NONRNF,SETINSLV,XL,20/CS: Brand: MEDLINE

## (undated) DEVICE — MPS® DELIVERY SET W/ARREST AGENT AND ADDITIVE CASSETTES, HEAT EXCHANGER & 10 FT. DELIVERY TUBING: Brand: MPS

## (undated) DEVICE — APPLICATOR MEDICATED 26 CC SOLUTION HI LT ORNG CHLORAPREP

## (undated) DEVICE — CANNULA TIP SPRY MAGELLAN

## (undated) DEVICE — GLOVE SURG SZ 65 L12IN FNGR THK79MIL GRN LTX FREE

## (undated) DEVICE — PROTECTOR ULN NRV PUR FOAM HK LOOP STRP ANATOMICALLY

## (undated) DEVICE — RETRACTOR SURG INSRT SUT HLD OCTOBASE

## (undated) DEVICE — SUTURE PERMAHAND SZ 0 L30IN NONABSORBABLE BLK SILK BRAID A306H

## (undated) DEVICE — GLOVE SURG SZ 7 L12IN FNGR THK79MIL GRN LTX FREE

## (undated) DEVICE — TAPE MED W1/8XL30IN WHT POLY

## (undated) DEVICE — BLADE ES L6IN ELASTOMERIC COAT EXT DURABLE BEND UPTO 90DEG

## (undated) DEVICE — CANNULA PERF 24FR CONN SITE 3/8IN SGL STG VEN W/ R ANG MTL

## (undated) DEVICE — SENSOR O2 AD 40 KG SATURATION INVOS LF DISP

## (undated) DEVICE — SUTURE PDS II SZ 0 L27IN ABSRB VLT L36MM CT-1 1/2 CIR Z340H

## (undated) DEVICE — ADAPTER PERF L7.5IN INLET LEG 3IN Y TYP VENT CLR CODE CLMP